# Patient Record
Sex: FEMALE | Race: WHITE | NOT HISPANIC OR LATINO | Employment: FULL TIME | URBAN - METROPOLITAN AREA
[De-identification: names, ages, dates, MRNs, and addresses within clinical notes are randomized per-mention and may not be internally consistent; named-entity substitution may affect disease eponyms.]

---

## 2021-01-26 ENCOUNTER — OFFICE VISIT (OUTPATIENT)
Dept: NEUROLOGY | Facility: CLINIC | Age: 41
End: 2021-01-26

## 2021-01-26 ENCOUNTER — TELEPHONE (OUTPATIENT)
Dept: NEUROLOGY | Facility: CLINIC | Age: 41
End: 2021-01-26

## 2021-01-26 VITALS
HEIGHT: 60 IN | SYSTOLIC BLOOD PRESSURE: 118 MMHG | OXYGEN SATURATION: 95 % | WEIGHT: 147 LBS | DIASTOLIC BLOOD PRESSURE: 70 MMHG | HEART RATE: 90 BPM | BODY MASS INDEX: 28.86 KG/M2

## 2021-01-26 DIAGNOSIS — G43.119 INTRACTABLE MIGRAINE WITH AURA WITHOUT STATUS MIGRAINOSUS: Primary | ICD-10-CM

## 2021-01-26 PROCEDURE — 99204 OFFICE O/P NEW MOD 45 MIN: CPT | Performed by: NURSE PRACTITIONER

## 2021-01-26 RX ORDER — TOPIRAMATE 25 MG/1
TABLET ORAL
Qty: 60 TABLET | Refills: 2 | Status: SHIPPED | OUTPATIENT
Start: 2021-01-26 | End: 2021-03-23 | Stop reason: SDUPTHER

## 2021-01-26 RX ORDER — AMITRIPTYLINE HYDROCHLORIDE 25 MG/1
50 TABLET, FILM COATED ORAL NIGHTLY
COMMUNITY
End: 2022-08-02 | Stop reason: SDUPTHER

## 2021-01-26 RX ORDER — CETIRIZINE HYDROCHLORIDE 10 MG/1
10 TABLET ORAL DAILY
COMMUNITY
End: 2021-03-02 | Stop reason: HOSPADM

## 2021-01-26 RX ORDER — ATORVASTATIN CALCIUM 10 MG/1
10 TABLET, FILM COATED ORAL NIGHTLY
COMMUNITY

## 2021-01-26 RX ORDER — LEVOTHYROXINE SODIUM 0.12 MG/1
125 TABLET ORAL DAILY
COMMUNITY

## 2021-01-26 RX ORDER — HYDROCHLOROTHIAZIDE 25 MG/1
25 TABLET ORAL DAILY
COMMUNITY

## 2021-01-26 RX ORDER — FEXOFENADINE HCL 180 MG/1
180 TABLET ORAL DAILY
COMMUNITY

## 2021-01-26 RX ORDER — SUMATRIPTAN 50 MG/1
50 TABLET, FILM COATED ORAL
COMMUNITY
End: 2021-10-01

## 2021-01-26 RX ORDER — ONDANSETRON 4 MG/1
4 TABLET, FILM COATED ORAL EVERY 8 HOURS PRN
COMMUNITY

## 2021-01-26 RX ORDER — LORATADINE 10 MG/1
10 TABLET ORAL DAILY
COMMUNITY
End: 2021-03-02 | Stop reason: HOSPADM

## 2021-01-26 NOTE — PROGRESS NOTES
Subjective:     Patient ID: Laura Cuenca is a 40 y.o. female presenting for evaluation for migraine headaches. She states that she has had them since she was about 12 years old. She did not seek treatment for them until recently. She has not had any head imaging. She has a headache almost every day. The pain is right sided. She has photophobia and phonophobia with them. She was prescribed Elavil for them and this seemed to help for the first few months but over time has seemed to lose its efficacy. It does help her sleep though so she does not want to discontinue it. She denies a family history of migraine. She smokes 1/2 PPD and has done so for 25 years. Does not drink alcohol. She does smoke marijuana regularly. She says she has done so for 25 years. Denies a family history of migraine.     History of Present Illness  The following portions of the patient's history were reviewed and updated as appropriate: allergies, current medications, past family history, past medical history, past social history, past surgical history and problem list.    Review of Systems   Constitutional: Negative for chills, fatigue and fever.   HENT: Negative for hearing loss, sneezing and trouble swallowing.    Eyes: Positive for visual disturbance. Negative for photophobia and redness.   Respiratory: Negative for cough, choking and wheezing.    Cardiovascular: Negative for chest pain, palpitations and leg swelling.   Gastrointestinal: Negative for diarrhea, nausea and vomiting.   Endocrine: Negative for cold intolerance, heat intolerance and polydipsia.   Genitourinary: Negative for decreased urine volume, difficulty urinating and urgency.   Musculoskeletal: Negative for back pain, gait problem and neck pain.   Skin: Negative for color change, pallor and wound.   Allergic/Immunologic: Positive for environmental allergies and food allergies. Negative for immunocompromised state.   Neurological: Positive for headaches. Negative for  dizziness, tremors, seizures, syncope, facial asymmetry, speech difficulty, weakness, light-headedness and numbness.   Hematological: Negative for adenopathy. Does not bruise/bleed easily.   Psychiatric/Behavioral: Negative for behavioral problems and self-injury. The patient is not hyperactive.         Objective:    Neurologic Exam  General: Well nourished, well developed, and in no acute distress.  HEENT: Normocephalic/atraumatic. Mucous membranes moist. Sclerae anicteric.   Heart: Regular rate and rhythm. No murmurs, rubs or gallops.  Lungs: Clear to auscultation bilaterally.  Skin: No notable rashes or lesions on the visible surfaces.   Extremities: No clubbing, cyanosis or significant edema.   Psychiatric: Pleasant, cooperative, and appropriate.   Neurologic Exam:  Mental Status:  Alert and oriented. Speech is fluent. Comprehension is intact.   Cranial Nerves II-XII: Pupils equal, round, reactive to light. Extraocular movements are full and conjugate in all directions. Pursuit movements do not provoke any apparent dizziness or discomfort.  No nystagmus noted.  Hearing is intact to voice. Facial strength and sensation are preserved and symmetric. Tongue and palate midline. Voice non-hoarse, non-dysarthric.   Motor: Normal bulk and tone of bilateral upper and lower extremities. Strength is 5/5 in all 4 extremities both proximally and distally. There are no abnormal or involuntary movements noted.  Sensation: Intact to light touch throughout. Romberg was negative with no significant sway.   Coordination: Fully intact. Finger-to-nose performed accurately bilaterally.  Reflexes: The deep tendon reflexes are 2+/4 in bilateral biceps, brachioradialis, triceps, patella, and Achilles.  No pathologic reflexes were noted.  Gait: Normal. No ataxia or apraxia.         Physical Exam    Assessment/Plan:     Diagnoses and all orders for this visit:    1. Intractable migraine with aura without status migrainosus (Primary)  -      MRI Brain With & Without Contrast; Future    Other orders  -     topiramate (Topamax) 25 MG tablet; Take one tablet by mouth nightly for 1 week, and then increase to 2 tablets by mouth nightly.  Dispense: 60 tablet; Refill: 2        The amitriptyline does improve her sleep so she wants to continue taking it. I see no problem with this but suggested she try topiramate for her headaches. R/B/SE reviewed. She denies a history of kidney stones. Will start with 25 mg nightly and increase by 25 mg weekly up to 100 mg nightly. Samples of Ubrelvy given to try prn. If this works well she will let us know and we can send a prescription. MRI brain ordered as well. Will call her with results. Discussed the importance of healthy lifestyle habits to reduce headache frequency, including healthy diet, regular exercise, good sleep hygiene, and smoking cessation. She will follow up in 8 weeks.

## 2021-01-26 NOTE — TELEPHONE ENCOUNTER
Pt wants her Topamax script sent to [facility].  She just told me when I checked her out.  I told her it may have already gone to the other pharmacy.  Please let pt know.      Thanks,  Ivis

## 2021-02-10 ENCOUNTER — HOSPITAL ENCOUNTER (OUTPATIENT)
Dept: MRI IMAGING | Facility: HOSPITAL | Age: 41
Discharge: HOME | End: 2021-02-10
Admitting: NURSE PRACTITIONER

## 2021-02-10 ENCOUNTER — DOCUMENTATION (OUTPATIENT)
Dept: NEUROLOGY | Facility: CLINIC | Age: 41
End: 2021-02-10

## 2021-02-10 DIAGNOSIS — G43.119 INTRACTABLE MIGRAINE WITH AURA WITHOUT STATUS MIGRAINOSUS: ICD-10-CM

## 2021-02-10 DIAGNOSIS — R90.89 ABNORMAL FINDING ON MRI OF BRAIN: Primary | ICD-10-CM

## 2021-02-10 PROCEDURE — 0 GADOBENATE DIMEGLUMINE 529 MG/ML SOLUTION: Performed by: NURSE PRACTITIONER

## 2021-02-10 PROCEDURE — 70553 MRI BRAIN STEM W/O & W/DYE: CPT

## 2021-02-10 PROCEDURE — A9577 INJ MULTIHANCE: HCPCS | Performed by: NURSE PRACTITIONER

## 2021-02-10 RX ADMIN — GADOBENATE DIMEGLUMINE 13 ML: 529 INJECTION, SOLUTION INTRAVENOUS at 08:32

## 2021-02-11 ENCOUNTER — TELEPHONE (OUTPATIENT)
Dept: NEUROSURGERY | Facility: CLINIC | Age: 41
End: 2021-02-11

## 2021-02-11 ENCOUNTER — OFFICE VISIT (OUTPATIENT)
Dept: NEUROSURGERY | Facility: CLINIC | Age: 41
End: 2021-02-11

## 2021-02-11 VITALS
WEIGHT: 143 LBS | BODY MASS INDEX: 28.07 KG/M2 | HEART RATE: 111 BPM | HEIGHT: 60 IN | TEMPERATURE: 98.9 F | DIASTOLIC BLOOD PRESSURE: 100 MMHG | SYSTOLIC BLOOD PRESSURE: 148 MMHG

## 2021-02-11 DIAGNOSIS — D32.9 MENINGIOMA (HCC): Primary | ICD-10-CM

## 2021-02-11 PROCEDURE — 99205 OFFICE O/P NEW HI 60 MIN: CPT | Performed by: NEUROLOGICAL SURGERY

## 2021-02-11 NOTE — PROGRESS NOTES
"Subjective   Patient ID: Laura Cuenca is a 40 y.o. female is being seen for consultation today at the request of JULIA Landon for meningioma.     Treatment:no recent    Today patient is having migraine, when blowing nose there is blood in mucous, N/T bilateral hands.     Patient, Provider, and MA are all wearing a mask in our office today.     History of Present Illness     This patient has a history of migraines which she has had since she was in her teens.  Recently they have gotten worse but she was found to have some changes in her thyroid hormones and her medication was increased and this did help the headaches for a while.  The headaches are somewhat global and do have a visual prodrome.  They sound like actual migraines.  She does have visual prodromes and has had some blind spots in her right eye after a migraine that occurred a couple of months ago.    The following portions of the patient's history were reviewed and updated as appropriate: allergies, current medications, past family history, past medical history, past social history, past surgical history and problem list.    Review of Systems   Constitutional: Negative for chills and fever.   HENT: Negative for congestion.    Eyes: Positive for photophobia and visual disturbance.   Musculoskeletal: Positive for neck pain and neck stiffness.   Neurological: Positive for weakness, numbness and headaches.       I have reviewed the review of systems as documented by my MA.      Objective     Vitals:    02/11/21 1345   BP: 148/100   Cuff Size: Adult   Pulse: 111   Temp: 98.9 °F (37.2 °C)   Weight: 64.9 kg (143 lb)   Height: 152.4 cm (60\")     Body mass index is 27.93 kg/m².      Physical Exam  Constitutional:       Appearance: She is well-developed.   HENT:      Head: Normocephalic and atraumatic.   Eyes:      Extraocular Movements: EOM normal.      Conjunctiva/sclera: Conjunctivae normal.      Pupils: Pupils are equal, round, and reactive to light. "   Neck:      Vascular: No carotid bruit.   Neurological:      Mental Status: She is oriented to person, place, and time.      Coordination: Finger-Nose-Finger Test and Heel to Shin Test normal.      Gait: Gait is intact.      Deep Tendon Reflexes:      Reflex Scores:       Tricep reflexes are 2+ on the right side and 2+ on the left side.       Bicep reflexes are 2+ on the right side and 2+ on the left side.       Brachioradialis reflexes are 2+ on the right side and 2+ on the left side.       Patellar reflexes are 2+ on the right side and 2+ on the left side.       Achilles reflexes are 2+ on the right side and 2+ on the left side.  Psychiatric:         Speech: Speech normal.       Neurologic Exam     Mental Status   Oriented to person, place, and time.   Registration of memory: Good recent and remote memory.   Attention: normal. Concentration: normal.   Speech: speech is normal   Level of consciousness: alert  Knowledge: consistent with education.     Cranial Nerves     CN II   Visual fields full to confrontation.   Visual acuity: normal    CN III, IV, VI   Pupils are equal, round, and reactive to light.  Extraocular motions are normal.     CN V   Facial sensation intact.   Right corneal reflex: normal  Left corneal reflex: normal    CN VII   Facial expression full, symmetric.   Right facial weakness: none  Left facial weakness: none    CN VIII   Hearing: intact    CN IX, X   Palate: symmetric    CN XI   Right sternocleidomastoid strength: normal  Left sternocleidomastoid strength: normal    CN XII   Tongue: not atrophic  Tongue deviation: none    Motor Exam   Muscle bulk: normal  Right arm tone: normal  Left arm tone: normal  Right leg tone: normal  Left leg tone: normal    Strength   Strength 5/5 except as noted.     Sensory Exam   Light touch normal.     Gait, Coordination, and Reflexes     Gait  Gait: normal    Coordination   Finger to nose coordination: normal  Heel to shin coordination: normal    Reflexes    Right brachioradialis: 2+  Left brachioradialis: 2+  Right biceps: 2+  Left biceps: 2+  Right triceps: 2+  Left triceps: 2+  Right patellar: 2+  Left patellar: 2+  Right achilles: 2+  Left achilles: 2+  Right : 2+  Left : 2+          Assessment/Plan   Independent Review of Radiographic Studies:      I personally reviewed the images from the following studies.    I reviewed her MRI done on 10 February of this year.  This shows a medium to large meningioma arising from the planum sphenoidale and compressing the surrounding brain.  The radiologist felt this was probably a meningioma.  I would agree.    Medical Decision Making:      I told the patient that we can either follow this or we can consider surgery.  I explained the advantages and disadvantages of each step.  I think most importantly she is only 40 years old and this thing is already decent sized and so there is almost no question she will at some point need to have surgery for it therefore I would tend to want to do this sooner than later because the risk is lower.  I explained that the biggest risk of surgery will be damaging her smell and therefore taste.  Other risks such as coma, death, paralysis, infection, bleeding, and anesthetic risk are lower.  We discussed the postoperative hospital and home course.  She would like to think about it and will call if she wishes to proceed.  If she does wish to proceed she will need a preoperative CT for stereotactic guidance.    Diagnoses and all orders for this visit:    1. Meningioma (CMS/HCC) (Primary)      Return for 2-3 week post op.

## 2021-02-11 NOTE — TELEPHONE ENCOUNTER
Pt called and was in today to see Dr. Carver. She has decided to go ahead with the surgery to remove the tumor.       Pt contact # [phone]  Best time to call: anytime

## 2021-02-15 ENCOUNTER — PREP FOR SURGERY (OUTPATIENT)
Dept: OTHER | Facility: HOSPITAL | Age: 41
End: 2021-02-15

## 2021-02-15 DIAGNOSIS — D32.9 MENINGIOMA (HCC): Primary | ICD-10-CM

## 2021-02-15 RX ORDER — CEFAZOLIN SODIUM 2 G/100ML
2 INJECTION, SOLUTION INTRAVENOUS ONCE
Status: CANCELLED | OUTPATIENT
Start: 2021-02-26 | End: 2021-02-15

## 2021-02-24 ENCOUNTER — HOSPITAL ENCOUNTER (OUTPATIENT)
Dept: CT IMAGING | Facility: HOSPITAL | Age: 41
End: 2021-02-24

## 2021-02-24 ENCOUNTER — APPOINTMENT (OUTPATIENT)
Dept: PREADMISSION TESTING | Facility: HOSPITAL | Age: 41
End: 2021-02-24

## 2021-02-24 ENCOUNTER — HOSPITAL ENCOUNTER (OUTPATIENT)
Dept: CT IMAGING | Facility: HOSPITAL | Age: 41
Discharge: HOME | End: 2021-02-24

## 2021-02-24 VITALS
BODY MASS INDEX: 27.72 KG/M2 | DIASTOLIC BLOOD PRESSURE: 89 MMHG | TEMPERATURE: 98.4 F | RESPIRATION RATE: 16 BRPM | HEART RATE: 98 BPM | SYSTOLIC BLOOD PRESSURE: 137 MMHG | HEIGHT: 60 IN | OXYGEN SATURATION: 98 % | WEIGHT: 141.2 LBS

## 2021-02-24 DIAGNOSIS — D32.9 MENINGIOMA (HCC): ICD-10-CM

## 2021-02-24 LAB
ABO GROUP BLD: NORMAL
ANION GAP SERPL CALCULATED.3IONS-SCNC: 10.2 MMOL/L (ref 5–15)
BLD GP AB SCN SERPL QL: NEGATIVE
BUN SERPL-MCNC: 10 MG/DL (ref 6–20)
BUN/CREAT SERPL: 12.8 (ref 7–25)
CALCIUM SPEC-SCNC: 9.2 MG/DL (ref 8.6–10.5)
CHLORIDE SERPL-SCNC: 102 MMOL/L (ref 98–107)
CO2 SERPL-SCNC: 23.8 MMOL/L (ref 22–29)
CREAT SERPL-MCNC: 0.78 MG/DL (ref 0.57–1)
DEPRECATED RDW RBC AUTO: 44 FL (ref 37–54)
ERYTHROCYTE [DISTWIDTH] IN BLOOD BY AUTOMATED COUNT: 14.1 % (ref 12.3–15.4)
GFR SERPL CREATININE-BSD FRML MDRD: 82 ML/MIN/1.73
GLUCOSE SERPL-MCNC: 107 MG/DL (ref 65–99)
HCG SERPL QL: NEGATIVE
HCT VFR BLD AUTO: 46.1 % (ref 34–46.6)
HGB BLD-MCNC: 15.8 G/DL (ref 12–15.9)
MCH RBC QN AUTO: 29.5 PG (ref 26.6–33)
MCHC RBC AUTO-ENTMCNC: 34.3 G/DL (ref 31.5–35.7)
MCV RBC AUTO: 86.2 FL (ref 79–97)
PLATELET # BLD AUTO: 380 10*3/MM3 (ref 140–450)
PMV BLD AUTO: 10 FL (ref 6–12)
POTASSIUM SERPL-SCNC: 3.2 MMOL/L (ref 3.5–5.2)
QT INTERVAL: 416 MS
RBC # BLD AUTO: 5.35 10*6/MM3 (ref 3.77–5.28)
RH BLD: POSITIVE
SODIUM SERPL-SCNC: 136 MMOL/L (ref 136–145)
T&S EXPIRATION DATE: NORMAL
WBC # BLD AUTO: 6.94 10*3/MM3 (ref 3.4–10.8)

## 2021-02-24 PROCEDURE — 84703 CHORIONIC GONADOTROPIN ASSAY: CPT

## 2021-02-24 PROCEDURE — 86901 BLOOD TYPING SEROLOGIC RH(D): CPT

## 2021-02-24 PROCEDURE — 80048 BASIC METABOLIC PNL TOTAL CA: CPT

## 2021-02-24 PROCEDURE — 0 IOPAMIDOL PER 1 ML: Performed by: NEUROLOGICAL SURGERY

## 2021-02-24 PROCEDURE — 93010 ELECTROCARDIOGRAM REPORT: CPT | Performed by: INTERNAL MEDICINE

## 2021-02-24 PROCEDURE — C9803 HOPD COVID-19 SPEC COLLECT: HCPCS | Performed by: NURSE PRACTITIONER

## 2021-02-24 PROCEDURE — 93005 ELECTROCARDIOGRAM TRACING: CPT

## 2021-02-24 PROCEDURE — 70470 CT HEAD/BRAIN W/O & W/DYE: CPT

## 2021-02-24 PROCEDURE — 36415 COLL VENOUS BLD VENIPUNCTURE: CPT

## 2021-02-24 PROCEDURE — 85027 COMPLETE CBC AUTOMATED: CPT

## 2021-02-24 PROCEDURE — 86900 BLOOD TYPING SEROLOGIC ABO: CPT

## 2021-02-24 PROCEDURE — U0004 COV-19 TEST NON-CDC HGH THRU: HCPCS | Performed by: NURSE PRACTITIONER

## 2021-02-24 PROCEDURE — 86850 RBC ANTIBODY SCREEN: CPT

## 2021-02-24 RX ADMIN — IOPAMIDOL 100 ML: 755 INJECTION, SOLUTION INTRAVENOUS at 08:01

## 2021-02-25 LAB — SARS-COV-2 RNA RESP QL NAA+PROBE: NOT DETECTED

## 2021-02-26 ENCOUNTER — APPOINTMENT (OUTPATIENT)
Dept: CT IMAGING | Facility: HOSPITAL | Age: 41
End: 2021-02-26

## 2021-02-26 ENCOUNTER — ANESTHESIA (OUTPATIENT)
Dept: PERIOP | Facility: HOSPITAL | Age: 41
End: 2021-02-26

## 2021-02-26 ENCOUNTER — ANESTHESIA EVENT (OUTPATIENT)
Dept: PERIOP | Facility: HOSPITAL | Age: 41
End: 2021-02-26

## 2021-02-26 ENCOUNTER — HOSPITAL ENCOUNTER (INPATIENT)
Facility: HOSPITAL | Age: 41
LOS: 4 days | Discharge: HOME | End: 2021-03-02
Attending: NEUROLOGICAL SURGERY | Admitting: NEUROLOGICAL SURGERY

## 2021-02-26 DIAGNOSIS — D32.9 MENINGIOMA (HCC): ICD-10-CM

## 2021-02-26 DIAGNOSIS — D33.2 DERMOID CYST OF BRAIN (HCC): Primary | ICD-10-CM

## 2021-02-26 LAB
ANION GAP SERPL CALCULATED.3IONS-SCNC: 14 MMOL/L (ref 5–15)
BASOPHILS # BLD AUTO: 0.07 10*3/MM3 (ref 0–0.2)
BASOPHILS NFR BLD AUTO: 0.3 % (ref 0–1.5)
BUN SERPL-MCNC: 13 MG/DL (ref 6–20)
BUN/CREAT SERPL: 17.6 (ref 7–25)
CALCIUM SPEC-SCNC: 7.8 MG/DL (ref 8.6–10.5)
CHLORIDE SERPL-SCNC: 100 MMOL/L (ref 98–107)
CO2 SERPL-SCNC: 21 MMOL/L (ref 22–29)
CREAT SERPL-MCNC: 0.74 MG/DL (ref 0.57–1)
DEPRECATED RDW RBC AUTO: 46.3 FL (ref 37–54)
EOSINOPHIL # BLD AUTO: 0.02 10*3/MM3 (ref 0–0.4)
EOSINOPHIL NFR BLD AUTO: 0.1 % (ref 0.3–6.2)
ERYTHROCYTE [DISTWIDTH] IN BLOOD BY AUTOMATED COUNT: 14 % (ref 12.3–15.4)
GFR SERPL CREATININE-BSD FRML MDRD: 87 ML/MIN/1.73
GLUCOSE BLDC GLUCOMTR-MCNC: 255 MG/DL (ref 70–130)
GLUCOSE SERPL-MCNC: 237 MG/DL (ref 65–99)
HCT VFR BLD AUTO: 40.8 % (ref 34–46.6)
HGB BLD-MCNC: 14 G/DL (ref 12–15.9)
IMM GRANULOCYTES # BLD AUTO: 0.16 10*3/MM3 (ref 0–0.05)
IMM GRANULOCYTES NFR BLD AUTO: 0.7 % (ref 0–0.5)
LYMPHOCYTES # BLD AUTO: 1.5 10*3/MM3 (ref 0.7–3.1)
LYMPHOCYTES NFR BLD AUTO: 6.2 % (ref 19.6–45.3)
MCH RBC QN AUTO: 31 PG (ref 26.6–33)
MCHC RBC AUTO-ENTMCNC: 34.3 G/DL (ref 31.5–35.7)
MCV RBC AUTO: 90.5 FL (ref 79–97)
MONOCYTES # BLD AUTO: 1.55 10*3/MM3 (ref 0.1–0.9)
MONOCYTES NFR BLD AUTO: 6.4 % (ref 5–12)
NEUTROPHILS NFR BLD AUTO: 20.9 10*3/MM3 (ref 1.7–7)
NEUTROPHILS NFR BLD AUTO: 86.3 % (ref 42.7–76)
NRBC BLD AUTO-RTO: 0 /100 WBC (ref 0–0.2)
PLATELET # BLD AUTO: 394 10*3/MM3 (ref 140–450)
PMV BLD AUTO: 10.2 FL (ref 6–12)
POTASSIUM SERPL-SCNC: 2.6 MMOL/L (ref 3.5–5.2)
RBC # BLD AUTO: 4.51 10*6/MM3 (ref 3.77–5.28)
SODIUM SERPL-SCNC: 135 MMOL/L (ref 136–145)
WBC # BLD AUTO: 24.2 10*3/MM3 (ref 3.4–10.8)

## 2021-02-26 PROCEDURE — 25010000002 MIDAZOLAM PER 1 MG: Performed by: ANESTHESIOLOGY

## 2021-02-26 PROCEDURE — C1713 ANCHOR/SCREW BN/BN,TIS/BN: HCPCS | Performed by: NEUROLOGICAL SURGERY

## 2021-02-26 PROCEDURE — 25010000002 HYDROMORPHONE PER 4 MG: Performed by: NEUROLOGICAL SURGERY

## 2021-02-26 PROCEDURE — 15769 GRFG AUTOL SOFT TISS DIR EXC: CPT | Performed by: NEUROLOGICAL SURGERY

## 2021-02-26 PROCEDURE — 61781 SCAN PROC CRANIAL INTRA: CPT | Performed by: NEUROLOGICAL SURGERY

## 2021-02-26 PROCEDURE — 25010000002 FENTANYL CITRATE (PF) 100 MCG/2ML SOLUTION: Performed by: NURSE ANESTHETIST, CERTIFIED REGISTERED

## 2021-02-26 PROCEDURE — 25010000002 ONDANSETRON PER 1 MG: Performed by: NURSE ANESTHETIST, CERTIFIED REGISTERED

## 2021-02-26 PROCEDURE — 25010000002 NEOSTIGMINE 10 MG/10ML SOLUTION: Performed by: STUDENT IN AN ORGANIZED HEALTH CARE EDUCATION/TRAINING PROGRAM

## 2021-02-26 PROCEDURE — 00B10ZZ EXCISION OF CEREBRAL MENINGES, OPEN APPROACH: ICD-10-PCS | Performed by: NEUROLOGICAL SURGERY

## 2021-02-26 PROCEDURE — 82962 GLUCOSE BLOOD TEST: CPT

## 2021-02-26 PROCEDURE — 88307 TISSUE EXAM BY PATHOLOGIST: CPT | Performed by: NEUROLOGICAL SURGERY

## 2021-02-26 PROCEDURE — 25010000003 CEFAZOLIN IN DEXTROSE 2-4 GM/100ML-% SOLUTION: Performed by: NEUROLOGICAL SURGERY

## 2021-02-26 PROCEDURE — 09US07Z SUPPLEMENT RIGHT FRONTAL SINUS WITH AUTOLOGOUS TISSUE SUBSTITUTE, OPEN APPROACH: ICD-10-PCS | Performed by: NEUROLOGICAL SURGERY

## 2021-02-26 PROCEDURE — 61510 CRNEC TREPH EXC BRN TUM STTL: CPT | Performed by: SPECIALIST/TECHNOLOGIST, OTHER

## 2021-02-26 PROCEDURE — 69990 MICROSURGERY ADD-ON: CPT | Performed by: NEUROLOGICAL SURGERY

## 2021-02-26 PROCEDURE — 25010000002 VANCOMYCIN 1 G RECONSTITUTED SOLUTION 1 EACH VIAL: Performed by: NEUROLOGICAL SURGERY

## 2021-02-26 PROCEDURE — 80048 BASIC METABOLIC PNL TOTAL CA: CPT | Performed by: NEUROLOGICAL SURGERY

## 2021-02-26 PROCEDURE — 25010000002 DEXAMETHASONE PER 1 MG: Performed by: NURSE ANESTHETIST, CERTIFIED REGISTERED

## 2021-02-26 PROCEDURE — 25010000002 PROPOFOL 10 MG/ML EMULSION: Performed by: NURSE ANESTHETIST, CERTIFIED REGISTERED

## 2021-02-26 PROCEDURE — 09UT07Z SUPPLEMENT LEFT FRONTAL SINUS WITH AUTOLOGOUS TISSUE SUBSTITUTE, OPEN APPROACH: ICD-10-PCS | Performed by: NEUROLOGICAL SURGERY

## 2021-02-26 PROCEDURE — 25810000003 SODIUM CHLORIDE 0.9 % WITH KCL 20 MEQ 20-0.9 MEQ/L-% SOLUTION: Performed by: NEUROLOGICAL SURGERY

## 2021-02-26 PROCEDURE — 0JB80ZZ EXCISION OF ABDOMEN SUBCUTANEOUS TISSUE AND FASCIA, OPEN APPROACH: ICD-10-PCS | Performed by: NEUROLOGICAL SURGERY

## 2021-02-26 PROCEDURE — 25010000002 HYDROMORPHONE PER 4 MG: Performed by: NURSE ANESTHETIST, CERTIFIED REGISTERED

## 2021-02-26 PROCEDURE — 70450 CT HEAD/BRAIN W/O DYE: CPT

## 2021-02-26 PROCEDURE — 85025 COMPLETE CBC W/AUTO DIFF WBC: CPT | Performed by: NEUROLOGICAL SURGERY

## 2021-02-26 PROCEDURE — 88311 DECALCIFY TISSUE: CPT | Performed by: NEUROLOGICAL SURGERY

## 2021-02-26 PROCEDURE — 25010000002 ONDANSETRON PER 1 MG: Performed by: NEUROLOGICAL SURGERY

## 2021-02-26 PROCEDURE — 61510 CRNEC TREPH EXC BRN TUM STTL: CPT | Performed by: NEUROLOGICAL SURGERY

## 2021-02-26 PROCEDURE — 25010000002 FENTANYL CITRATE (PF) 100 MCG/2ML SOLUTION: Performed by: ANESTHESIOLOGY

## 2021-02-26 DEVICE — ABSORBABLE HEMOSTAT (OXIDIZED REGENERATED CELLULOSE, U.S.P.)
Type: IMPLANTABLE DEVICE | Site: BRAIN | Status: FUNCTIONAL
Brand: SURGICEL FIBRILLAR

## 2021-02-26 DEVICE — SCRW CRS/DRV DRL FREE MICRO TI 1.5X4MM: Type: IMPLANTABLE DEVICE | Site: CRANIAL | Status: FUNCTIONAL

## 2021-02-26 DEVICE — PLT CVR LEVELONE BURHL LP CONTRL .3X17X1.5MM: Type: IMPLANTABLE DEVICE | Site: CRANIAL | Status: FUNCTIONAL

## 2021-02-26 DEVICE — SSC BONE WAX
Type: IMPLANTABLE DEVICE | Site: CRANIAL | Status: FUNCTIONAL
Brand: SSC BONE WAX

## 2021-02-26 DEVICE — PLT CVR LEVELONE BURHL LP CONTRL .3X22X1.5MM: Type: IMPLANTABLE DEVICE | Site: CRANIAL | Status: FUNCTIONAL

## 2021-02-26 DEVICE — FLOSEAL HEMOSTATIC MATRIX, 5ML
Type: IMPLANTABLE DEVICE | Site: BRAIN | Status: FUNCTIONAL
Brand: FLOSEAL HEMOSTATIC MATRIX

## 2021-02-26 RX ORDER — HYDROCHLOROTHIAZIDE 25 MG/1
25 TABLET ORAL DAILY
Status: DISCONTINUED | OUTPATIENT
Start: 2021-02-26 | End: 2021-03-02 | Stop reason: HOSPADM

## 2021-02-26 RX ORDER — TOPIRAMATE 50 MG/1
50 TABLET, FILM COATED ORAL NIGHTLY
Status: DISCONTINUED | OUTPATIENT
Start: 2021-02-26 | End: 2021-03-02 | Stop reason: HOSPADM

## 2021-02-26 RX ORDER — NEOSTIGMINE METHYLSULFATE 1 MG/ML
INJECTION, SOLUTION INTRAVENOUS AS NEEDED
Status: DISCONTINUED | OUTPATIENT
Start: 2021-02-26 | End: 2021-02-26 | Stop reason: SURG

## 2021-02-26 RX ORDER — FENTANYL CITRATE 50 UG/ML
50 INJECTION, SOLUTION INTRAMUSCULAR; INTRAVENOUS
Status: DISCONTINUED | OUTPATIENT
Start: 2021-02-26 | End: 2021-02-26 | Stop reason: HOSPADM

## 2021-02-26 RX ORDER — ACETAMINOPHEN 325 MG/1
650 TABLET ORAL EVERY 6 HOURS PRN
COMMUNITY
End: 2022-05-11

## 2021-02-26 RX ORDER — ONDANSETRON 2 MG/ML
4 INJECTION INTRAMUSCULAR; INTRAVENOUS EVERY 6 HOURS PRN
Status: DISCONTINUED | OUTPATIENT
Start: 2021-02-26 | End: 2021-03-02 | Stop reason: HOSPADM

## 2021-02-26 RX ORDER — LABETALOL HYDROCHLORIDE 5 MG/ML
5 INJECTION, SOLUTION INTRAVENOUS
Status: DISCONTINUED | OUTPATIENT
Start: 2021-02-26 | End: 2021-02-26 | Stop reason: HOSPADM

## 2021-02-26 RX ORDER — POTASSIUM CHLORIDE 7.45 MG/ML
10 INJECTION INTRAVENOUS
Status: DISCONTINUED | OUTPATIENT
Start: 2021-02-26 | End: 2021-03-02 | Stop reason: HOSPADM

## 2021-02-26 RX ORDER — LIDOCAINE HYDROCHLORIDE 10 MG/ML
0.5 INJECTION, SOLUTION EPIDURAL; INFILTRATION; INTRACAUDAL; PERINEURAL ONCE AS NEEDED
Status: DISCONTINUED | OUTPATIENT
Start: 2021-02-26 | End: 2021-02-26 | Stop reason: HOSPADM

## 2021-02-26 RX ORDER — NALOXONE HCL 0.4 MG/ML
0.2 VIAL (ML) INJECTION AS NEEDED
Status: DISCONTINUED | OUTPATIENT
Start: 2021-02-26 | End: 2021-02-26 | Stop reason: HOSPADM

## 2021-02-26 RX ORDER — MIDAZOLAM HYDROCHLORIDE 1 MG/ML
1 INJECTION INTRAMUSCULAR; INTRAVENOUS
Status: COMPLETED | OUTPATIENT
Start: 2021-02-26 | End: 2021-02-26

## 2021-02-26 RX ORDER — LEVOTHYROXINE SODIUM 0.12 MG/1
125 TABLET ORAL DAILY
Status: DISCONTINUED | OUTPATIENT
Start: 2021-02-27 | End: 2021-03-02 | Stop reason: HOSPADM

## 2021-02-26 RX ORDER — ONDANSETRON 2 MG/ML
INJECTION INTRAMUSCULAR; INTRAVENOUS AS NEEDED
Status: DISCONTINUED | OUTPATIENT
Start: 2021-02-26 | End: 2021-02-26 | Stop reason: SURG

## 2021-02-26 RX ORDER — MIDAZOLAM HYDROCHLORIDE 1 MG/ML
INJECTION INTRAMUSCULAR; INTRAVENOUS
Status: COMPLETED | OUTPATIENT
Start: 2021-02-26 | End: 2021-02-26

## 2021-02-26 RX ORDER — MAGNESIUM SULFATE HEPTAHYDRATE 40 MG/ML
4 INJECTION, SOLUTION INTRAVENOUS AS NEEDED
Status: DISCONTINUED | OUTPATIENT
Start: 2021-02-26 | End: 2021-03-01

## 2021-02-26 RX ORDER — DEXAMETHASONE SODIUM PHOSPHATE 10 MG/ML
INJECTION INTRAMUSCULAR; INTRAVENOUS AS NEEDED
Status: DISCONTINUED | OUTPATIENT
Start: 2021-02-26 | End: 2021-02-26 | Stop reason: SURG

## 2021-02-26 RX ORDER — POTASSIUM CHLORIDE 750 MG/1
40 TABLET, FILM COATED, EXTENDED RELEASE ORAL AS NEEDED
Status: DISCONTINUED | OUTPATIENT
Start: 2021-02-26 | End: 2021-03-02 | Stop reason: HOSPADM

## 2021-02-26 RX ORDER — SODIUM CHLORIDE 0.9 % (FLUSH) 0.9 %
3-10 SYRINGE (ML) INJECTION AS NEEDED
Status: DISCONTINUED | OUTPATIENT
Start: 2021-02-26 | End: 2021-02-26 | Stop reason: HOSPADM

## 2021-02-26 RX ORDER — FLUMAZENIL 0.1 MG/ML
0.2 INJECTION INTRAVENOUS AS NEEDED
Status: DISCONTINUED | OUTPATIENT
Start: 2021-02-26 | End: 2021-02-26 | Stop reason: HOSPADM

## 2021-02-26 RX ORDER — HYDROMORPHONE HYDROCHLORIDE 1 MG/ML
0.5 INJECTION, SOLUTION INTRAMUSCULAR; INTRAVENOUS; SUBCUTANEOUS
Status: DISCONTINUED | OUTPATIENT
Start: 2021-02-26 | End: 2021-02-26 | Stop reason: HOSPADM

## 2021-02-26 RX ORDER — PROMETHAZINE HYDROCHLORIDE 25 MG/1
25 SUPPOSITORY RECTAL ONCE AS NEEDED
Status: DISCONTINUED | OUTPATIENT
Start: 2021-02-26 | End: 2021-02-26 | Stop reason: HOSPADM

## 2021-02-26 RX ORDER — DIPHENHYDRAMINE HCL 25 MG
25 CAPSULE ORAL
Status: DISCONTINUED | OUTPATIENT
Start: 2021-02-26 | End: 2021-02-26 | Stop reason: HOSPADM

## 2021-02-26 RX ORDER — ONDANSETRON 2 MG/ML
4 INJECTION INTRAMUSCULAR; INTRAVENOUS ONCE AS NEEDED
Status: COMPLETED | OUTPATIENT
Start: 2021-02-26 | End: 2021-02-26

## 2021-02-26 RX ORDER — POTASSIUM CHLORIDE 1.5 G/1.77G
40 POWDER, FOR SOLUTION ORAL AS NEEDED
Status: DISCONTINUED | OUTPATIENT
Start: 2021-02-26 | End: 2021-03-02 | Stop reason: HOSPADM

## 2021-02-26 RX ORDER — SUMATRIPTAN 50 MG/1
50 TABLET, FILM COATED ORAL
Status: DISCONTINUED | OUTPATIENT
Start: 2021-02-26 | End: 2021-03-02 | Stop reason: HOSPADM

## 2021-02-26 RX ORDER — FENTANYL CITRATE 50 UG/ML
INJECTION, SOLUTION INTRAMUSCULAR; INTRAVENOUS AS NEEDED
Status: DISCONTINUED | OUTPATIENT
Start: 2021-02-26 | End: 2021-02-26 | Stop reason: SURG

## 2021-02-26 RX ORDER — PROPOFOL 10 MG/ML
VIAL (ML) INTRAVENOUS AS NEEDED
Status: DISCONTINUED | OUTPATIENT
Start: 2021-02-26 | End: 2021-02-26 | Stop reason: SURG

## 2021-02-26 RX ORDER — GLYCOPYRROLATE 0.2 MG/ML
INJECTION INTRAMUSCULAR; INTRAVENOUS AS NEEDED
Status: DISCONTINUED | OUTPATIENT
Start: 2021-02-26 | End: 2021-02-26 | Stop reason: SURG

## 2021-02-26 RX ORDER — MAGNESIUM SULFATE HEPTAHYDRATE 40 MG/ML
2 INJECTION, SOLUTION INTRAVENOUS AS NEEDED
Status: DISCONTINUED | OUTPATIENT
Start: 2021-02-26 | End: 2021-03-01

## 2021-02-26 RX ORDER — ROCURONIUM BROMIDE 10 MG/ML
INJECTION, SOLUTION INTRAVENOUS AS NEEDED
Status: DISCONTINUED | OUTPATIENT
Start: 2021-02-26 | End: 2021-02-26 | Stop reason: SURG

## 2021-02-26 RX ORDER — CEFAZOLIN SODIUM 2 G/100ML
2 INJECTION, SOLUTION INTRAVENOUS ONCE
Status: COMPLETED | OUTPATIENT
Start: 2021-02-26 | End: 2021-02-26

## 2021-02-26 RX ORDER — SODIUM CHLORIDE 0.9 % (FLUSH) 0.9 %
3 SYRINGE (ML) INJECTION EVERY 12 HOURS SCHEDULED
Status: DISCONTINUED | OUTPATIENT
Start: 2021-02-26 | End: 2021-02-26 | Stop reason: HOSPADM

## 2021-02-26 RX ORDER — CEFAZOLIN SODIUM 2 G/100ML
2 INJECTION, SOLUTION INTRAVENOUS EVERY 8 HOURS
Status: COMPLETED | OUTPATIENT
Start: 2021-02-26 | End: 2021-02-28

## 2021-02-26 RX ORDER — ESMOLOL HYDROCHLORIDE 10 MG/ML
INJECTION INTRAVENOUS AS NEEDED
Status: DISCONTINUED | OUTPATIENT
Start: 2021-02-26 | End: 2021-02-26 | Stop reason: SURG

## 2021-02-26 RX ORDER — HYDROMORPHONE HYDROCHLORIDE 1 MG/ML
0.5 INJECTION, SOLUTION INTRAMUSCULAR; INTRAVENOUS; SUBCUTANEOUS
Status: DISCONTINUED | OUTPATIENT
Start: 2021-02-26 | End: 2021-02-27

## 2021-02-26 RX ORDER — SODIUM CHLORIDE, SODIUM ACETATE ANHYDROUS, SODIUM GLUCONATE, POTASSIUM CHLORIDE, AND MAGNESIUM CHLORIDE 526; 222; 502; 37; 30 MG/100ML; MG/100ML; MG/100ML; MG/100ML; MG/100ML
IRRIGANT IRRIGATION AS NEEDED
Status: DISCONTINUED | OUTPATIENT
Start: 2021-02-26 | End: 2021-02-26 | Stop reason: HOSPADM

## 2021-02-26 RX ORDER — EPHEDRINE SULFATE 50 MG/ML
5 INJECTION, SOLUTION INTRAVENOUS ONCE AS NEEDED
Status: DISCONTINUED | OUTPATIENT
Start: 2021-02-26 | End: 2021-02-26 | Stop reason: HOSPADM

## 2021-02-26 RX ORDER — AMITRIPTYLINE HYDROCHLORIDE 50 MG/1
50 TABLET, FILM COATED ORAL NIGHTLY
Status: DISCONTINUED | OUTPATIENT
Start: 2021-02-26 | End: 2021-03-02 | Stop reason: HOSPADM

## 2021-02-26 RX ORDER — HYDRALAZINE HYDROCHLORIDE 20 MG/ML
5 INJECTION INTRAMUSCULAR; INTRAVENOUS
Status: DISCONTINUED | OUTPATIENT
Start: 2021-02-26 | End: 2021-02-26 | Stop reason: HOSPADM

## 2021-02-26 RX ORDER — HYDROCODONE BITARTRATE AND ACETAMINOPHEN 5; 325 MG/1; MG/1
1 TABLET ORAL EVERY 4 HOURS PRN
Status: DISCONTINUED | OUTPATIENT
Start: 2021-02-26 | End: 2021-03-02 | Stop reason: HOSPADM

## 2021-02-26 RX ORDER — SODIUM CHLORIDE AND POTASSIUM CHLORIDE 150; 900 MG/100ML; MG/100ML
100 INJECTION, SOLUTION INTRAVENOUS CONTINUOUS
Status: DISCONTINUED | OUTPATIENT
Start: 2021-02-26 | End: 2021-02-28

## 2021-02-26 RX ORDER — LIDOCAINE HYDROCHLORIDE 20 MG/ML
INJECTION, SOLUTION INFILTRATION; PERINEURAL AS NEEDED
Status: DISCONTINUED | OUTPATIENT
Start: 2021-02-26 | End: 2021-02-26 | Stop reason: SURG

## 2021-02-26 RX ORDER — ONDANSETRON 4 MG/1
4 TABLET, FILM COATED ORAL EVERY 6 HOURS PRN
Status: DISCONTINUED | OUTPATIENT
Start: 2021-02-26 | End: 2021-03-02 | Stop reason: HOSPADM

## 2021-02-26 RX ORDER — FAMOTIDINE 10 MG/ML
20 INJECTION, SOLUTION INTRAVENOUS ONCE
Status: COMPLETED | OUTPATIENT
Start: 2021-02-26 | End: 2021-02-26

## 2021-02-26 RX ORDER — PROMETHAZINE HYDROCHLORIDE 25 MG/1
25 TABLET ORAL ONCE AS NEEDED
Status: DISCONTINUED | OUTPATIENT
Start: 2021-02-26 | End: 2021-02-26 | Stop reason: HOSPADM

## 2021-02-26 RX ORDER — DIPHENHYDRAMINE HYDROCHLORIDE 50 MG/ML
12.5 INJECTION INTRAMUSCULAR; INTRAVENOUS
Status: DISCONTINUED | OUTPATIENT
Start: 2021-02-26 | End: 2021-02-26 | Stop reason: HOSPADM

## 2021-02-26 RX ORDER — MIDAZOLAM HYDROCHLORIDE 1 MG/ML
2 INJECTION INTRAMUSCULAR; INTRAVENOUS
Status: COMPLETED | OUTPATIENT
Start: 2021-02-26 | End: 2021-02-26

## 2021-02-26 RX ORDER — SODIUM CHLORIDE, SODIUM LACTATE, POTASSIUM CHLORIDE, CALCIUM CHLORIDE 600; 310; 30; 20 MG/100ML; MG/100ML; MG/100ML; MG/100ML
9 INJECTION, SOLUTION INTRAVENOUS CONTINUOUS
Status: DISCONTINUED | OUTPATIENT
Start: 2021-02-26 | End: 2021-02-26

## 2021-02-26 RX ORDER — HYDROMORPHONE HCL 110MG/55ML
PATIENT CONTROLLED ANALGESIA SYRINGE INTRAVENOUS AS NEEDED
Status: DISCONTINUED | OUTPATIENT
Start: 2021-02-26 | End: 2021-02-26 | Stop reason: SURG

## 2021-02-26 RX ORDER — NALOXONE HCL 0.4 MG/ML
0.4 VIAL (ML) INJECTION
Status: DISCONTINUED | OUTPATIENT
Start: 2021-02-26 | End: 2021-02-27

## 2021-02-26 RX ADMIN — HYDROMORPHONE HYDROCHLORIDE 0.5 MG: 2 INJECTION, SOLUTION INTRAMUSCULAR; INTRAVENOUS; SUBCUTANEOUS at 13:24

## 2021-02-26 RX ADMIN — ROCURONIUM BROMIDE 10 MG: 50 INJECTION INTRAVENOUS at 12:54

## 2021-02-26 RX ADMIN — PROPOFOL 200 MG: 10 INJECTION, EMULSION INTRAVENOUS at 11:30

## 2021-02-26 RX ADMIN — ONDANSETRON 4 MG: 2 INJECTION INTRAMUSCULAR; INTRAVENOUS at 17:31

## 2021-02-26 RX ADMIN — MIDAZOLAM 1 MG: 1 INJECTION INTRAMUSCULAR; INTRAVENOUS at 10:14

## 2021-02-26 RX ADMIN — HYDROMORPHONE HYDROCHLORIDE 0.5 MG: 1 INJECTION, SOLUTION INTRAMUSCULAR; INTRAVENOUS; SUBCUTANEOUS at 19:24

## 2021-02-26 RX ADMIN — FAMOTIDINE 20 MG: 10 INJECTION INTRAVENOUS at 09:12

## 2021-02-26 RX ADMIN — MIDAZOLAM 1 MG: 1 INJECTION INTRAMUSCULAR; INTRAVENOUS at 09:10

## 2021-02-26 RX ADMIN — DEXAMETHASONE SODIUM PHOSPHATE 8 MG: 10 INJECTION INTRAMUSCULAR; INTRAVENOUS at 11:53

## 2021-02-26 RX ADMIN — NICARDIPINE HYDROCHLORIDE 5 MG/HR: 2.5 INJECTION, SOLUTION INTRAVENOUS at 20:39

## 2021-02-26 RX ADMIN — ROCURONIUM BROMIDE 10 MG: 50 INJECTION INTRAVENOUS at 13:27

## 2021-02-26 RX ADMIN — LIDOCAINE HYDROCHLORIDE 80 MG: 20 INJECTION, SOLUTION INFILTRATION; PERINEURAL at 11:30

## 2021-02-26 RX ADMIN — LIDOCAINE HYDROCHLORIDE 65 MG: 20 INJECTION, SOLUTION INFILTRATION; PERINEURAL at 15:31

## 2021-02-26 RX ADMIN — ESMOLOL HYDROCHLORIDE 25 MG: 100 INJECTION, SOLUTION INTRAVENOUS at 15:44

## 2021-02-26 RX ADMIN — FENTANYL CITRATE 50 MCG: 50 INJECTION INTRAMUSCULAR; INTRAVENOUS at 12:19

## 2021-02-26 RX ADMIN — TOPIRAMATE 50 MG: 50 TABLET, FILM COATED ORAL at 20:37

## 2021-02-26 RX ADMIN — MIDAZOLAM 1 MG: 1 INJECTION INTRAMUSCULAR; INTRAVENOUS at 09:46

## 2021-02-26 RX ADMIN — NEOSTIGMINE METHYLSULFATE 4 MG: 1 INJECTION INTRAVENOUS at 15:41

## 2021-02-26 RX ADMIN — ESMOLOL HYDROCHLORIDE 20 MG: 100 INJECTION, SOLUTION INTRAVENOUS at 14:33

## 2021-02-26 RX ADMIN — CEFAZOLIN SODIUM 2 G: 2 INJECTION, SOLUTION INTRAVENOUS at 19:33

## 2021-02-26 RX ADMIN — ESMOLOL HYDROCHLORIDE 10 MG: 100 INJECTION, SOLUTION INTRAVENOUS at 15:09

## 2021-02-26 RX ADMIN — FENTANYL CITRATE 50 MCG: 50 INJECTION INTRAMUSCULAR; INTRAVENOUS at 11:30

## 2021-02-26 RX ADMIN — FENTANYL CITRATE 50 MCG: 50 INJECTION, SOLUTION INTRAMUSCULAR; INTRAVENOUS at 18:11

## 2021-02-26 RX ADMIN — SODIUM CHLORIDE, POTASSIUM CHLORIDE, SODIUM LACTATE AND CALCIUM CHLORIDE: 600; 310; 30; 20 INJECTION, SOLUTION INTRAVENOUS at 12:28

## 2021-02-26 RX ADMIN — FENTANYL CITRATE 50 MCG: 50 INJECTION, SOLUTION INTRAMUSCULAR; INTRAVENOUS at 09:46

## 2021-02-26 RX ADMIN — FENTANYL CITRATE 100 MCG: 50 INJECTION INTRAMUSCULAR; INTRAVENOUS at 11:27

## 2021-02-26 RX ADMIN — ROCURONIUM BROMIDE 50 MG: 50 INJECTION INTRAVENOUS at 11:30

## 2021-02-26 RX ADMIN — FENTANYL CITRATE 50 MCG: 50 INJECTION, SOLUTION INTRAMUSCULAR; INTRAVENOUS at 18:18

## 2021-02-26 RX ADMIN — POTASSIUM CHLORIDE AND SODIUM CHLORIDE 100 ML/HR: 900; 150 INJECTION, SOLUTION INTRAVENOUS at 20:36

## 2021-02-26 RX ADMIN — HYDROMORPHONE HYDROCHLORIDE 0.5 MG: 1 INJECTION, SOLUTION INTRAMUSCULAR; INTRAVENOUS; SUBCUTANEOUS at 21:47

## 2021-02-26 RX ADMIN — ONDANSETRON 4 MG: 2 INJECTION INTRAMUSCULAR; INTRAVENOUS at 19:17

## 2021-02-26 RX ADMIN — NICARDIPINE HYDROCHLORIDE 5 MG/HR: 2.5 INJECTION, SOLUTION INTRAVENOUS at 16:30

## 2021-02-26 RX ADMIN — ESMOLOL HYDROCHLORIDE 10 MG: 100 INJECTION, SOLUTION INTRAVENOUS at 14:08

## 2021-02-26 RX ADMIN — SODIUM CHLORIDE, POTASSIUM CHLORIDE, SODIUM LACTATE AND CALCIUM CHLORIDE 9 ML/HR: 600; 310; 30; 20 INJECTION, SOLUTION INTRAVENOUS at 08:35

## 2021-02-26 RX ADMIN — AMITRIPTYLINE HYDROCHLORIDE 50 MG: 50 TABLET, FILM COATED ORAL at 20:36

## 2021-02-26 RX ADMIN — HYDROCHLOROTHIAZIDE 25 MG: 25 TABLET ORAL at 20:36

## 2021-02-26 RX ADMIN — MIDAZOLAM 1 MG: 1 INJECTION INTRAMUSCULAR; INTRAVENOUS at 09:20

## 2021-02-26 RX ADMIN — SODIUM CHLORIDE 5 MG/HR: 9 INJECTION, SOLUTION INTRAVENOUS at 16:25

## 2021-02-26 RX ADMIN — ONDANSETRON 4 MG: 2 INJECTION INTRAMUSCULAR; INTRAVENOUS at 15:15

## 2021-02-26 RX ADMIN — ROCURONIUM BROMIDE 10 MG: 50 INJECTION INTRAVENOUS at 12:25

## 2021-02-26 RX ADMIN — CEFAZOLIN SODIUM 2 G: 2 INJECTION, SOLUTION INTRAVENOUS at 11:40

## 2021-02-26 RX ADMIN — HYDROMORPHONE HYDROCHLORIDE 0.5 MG: 2 INJECTION, SOLUTION INTRAMUSCULAR; INTRAVENOUS; SUBCUTANEOUS at 13:03

## 2021-02-26 RX ADMIN — GLYCOPYRROLATE 0.6 MG: 0.2 INJECTION INTRAMUSCULAR; INTRAVENOUS at 15:41

## 2021-02-26 RX ADMIN — FENTANYL CITRATE 50 MCG: 50 INJECTION, SOLUTION INTRAMUSCULAR; INTRAVENOUS at 10:14

## 2021-02-26 NOTE — ANESTHESIA PROCEDURE NOTES
Airway  Urgency: elective    Date/Time: 2/26/2021 11:33 AM  Airway not difficult    General Information and Staff    Patient location during procedure: OR  CRNA: Taty Puga CRNA    Indications and Patient Condition  Indications for airway management: airway protection    Preoxygenated: yes  Mask difficulty assessment: 2 - vent by mask + OA or adjuvant +/- NMBA    Final Airway Details  Final airway type: endotracheal airway      Successful airway: ETT  Cuffed: yes   Successful intubation technique: direct laryngoscopy  Facilitating devices/methods: Bougie  Endotracheal tube insertion site: oral  Blade: Castillo  Blade size: 2  Cormack-Lehane Classification: grade IIa - partial view of glottis  Placement verified by: chest auscultation and capnometry   Cuff volume (mL): 8  Number of attempts at approach: 2  Assessment: lips, teeth, and gum same as pre-op and atraumatic intubation    Additional Comments  PreO2 with 100% O2;  FeO2 >85%;  sniff position; easy mask ventilation;  Intubated with no difficultly after eyes taped; Appears atraumatic;  Lips and teeth intact as preop condition;  Airway secured. Connected to ventilator.

## 2021-02-26 NOTE — ANESTHESIA PREPROCEDURE EVALUATION
Anesthesia Evaluation     Patient summary reviewed and Nursing notes reviewed   no history of anesthetic complications:  NPO Solid Status: > 8 hours  NPO Liquid Status: > 2 hours           Airway   Mallampati: II  TM distance: >3 FB  Neck ROM: full  no difficulty expected  Dental - normal exam     Pulmonary - normal exam   (+) a smoker Current Smoked day of surgery,   (-) COPD, asthma, lung cancer  Cardiovascular - normal exam  Exercise tolerance: excellent (>7 METS)    ECG reviewed  Rhythm: regular  Rate: normal    (+) hypertension well controlled less than 2 medications,   (-) valvular problems/murmurs, past MI, CAD, dysrhythmias, angina, CHF, orthopnea, cardiac stents, CABG, pericardial effusion      Neuro/Psych  (+) headaches,     (-) seizures, TIA, CVA    ROS Comment: Frontal mass of brain  GI/Hepatic/Renal/Endo    (+)   thyroid problem hypothyroidism  (-)  obesity, morbid obesity, hiatal hernia, GERD, PUD, hepatitis, liver disease, no renal disease, diabetes, GI bleed    Musculoskeletal (-) negative ROS    Abdominal  - normal exam   Substance History - negative use     OB/GYN negative ob/gyn ROS         Other - negative ROS                       Anesthesia Plan    ASA 2     general   (GA w/ A line)  intravenous induction     Anesthetic plan, all risks, benefits, and alternatives have been provided, discussed and informed consent has been obtained with: patient.    Plan discussed with CRNA and attending.

## 2021-02-26 NOTE — ANESTHESIA POSTPROCEDURE EVALUATION
Patient: Laura Cuenca    Procedure Summary     Date: 02/26/21 Room / Location: [facility] OR  / [facility] MAIN OR    Anesthesia Start: 1127 Anesthesia Stop: 1608    Procedure: BIFRONTAL CRANIOTOMY FOR TUMOR STEREOTACTIC, WITH FAT GRAFTING (N/A Head) Diagnosis:       Meningioma (CMS/HCC)      (Meningioma (CMS/HCC) [D32.9])    Surgeon: Gume Carver MD Provider: Mendez Palacio MD    Anesthesia Type: general ASA Status: 2          Anesthesia Type: general    Vitals  Vitals Value Taken Time   /63 02/26/21 1800   Temp 37.6 °C (99.6 °F) 02/26/21 1603   Pulse 117 02/26/21 1810   Resp 16 02/26/21 1715   SpO2 97 % 02/26/21 1810   Vitals shown include unvalidated device data.        Post Anesthesia Care and Evaluation    Patient location during evaluation: bedside  Patient participation: complete - patient participated  Level of consciousness: awake and alert  Pain management: adequate  Airway patency: patent  Anesthetic complications: No anesthetic complications  PONV Status: controlled  Cardiovascular status: acceptable  Respiratory status: acceptable  Hydration status: acceptable

## 2021-02-26 NOTE — ANESTHESIA PROCEDURE NOTES
Arterial Line      Patient reassessed immediately prior to procedure    Patient location during procedure: holding area  Start time: 2/26/2021 10:11 AM  Stop Time:2/26/2021 10:16 AM       Line placed for hemodynamic monitoring and ABGs/Labs/ISTAT.  Performed By   Anesthesiologist: Romulo Stewart MD  Preanesthetic Checklist  Completed: patient identified, site marked, surgical consent, pre-op evaluation, timeout performed, IV checked, risks and benefits discussed and monitors and equipment checked  Arterial Line Prep   Sterile Tech: gloves, mask, cap and sterile barriers  Prep: ChloraPrep  Patient monitoring: blood pressure monitoring, continuous pulse oximetry and EKG  Arterial Line Procedure   Laterality:left  Location:  radial artery  Catheter size: 20 G   Guidance: ultrasound guided  PROCEDURE NOTE/ULTRASOUND INTERPRETATION.  Using ultrasound guidance the potential vascular sites for insertion of the catheter were visualized to determine the patency of the vessel to be used for vascular access.  After selecting the appropriate site for insertion, the needle was visualized under ultrasound being inserted into the radial artery, followed by ultrasound confirmation of wire and catheter placement. There were no abnormalities seen on ultrasound; an image was taken; and the patient tolerated the procedure with no complications.   Number of attempts: 2  Successful placement: yes  Post Assessment   Dressing Type: occlusive dressing applied, secured with tape and wrist guard applied.   Complications no  Circ/Move/Sens Assessment: normal and unchanged.   Patient Tolerance: patient tolerated the procedure well with no apparent complications  Additional Notes  Micropuncture set utilized

## 2021-02-27 ENCOUNTER — APPOINTMENT (OUTPATIENT)
Dept: CT IMAGING | Facility: HOSPITAL | Age: 41
End: 2021-02-27

## 2021-02-27 LAB
ANION GAP SERPL CALCULATED.3IONS-SCNC: 8.9 MMOL/L (ref 5–15)
APTT PPP: 26.3 SECONDS (ref 22.7–35.4)
BASOPHILS # BLD AUTO: 0.02 10*3/MM3 (ref 0–0.2)
BASOPHILS NFR BLD AUTO: 0.1 % (ref 0–1.5)
BUN SERPL-MCNC: 10 MG/DL (ref 6–20)
BUN/CREAT SERPL: 16.1 (ref 7–25)
CALCIUM SPEC-SCNC: 7.8 MG/DL (ref 8.6–10.5)
CHLORIDE SERPL-SCNC: 101 MMOL/L (ref 98–107)
CO2 SERPL-SCNC: 23.1 MMOL/L (ref 22–29)
CREAT SERPL-MCNC: 0.62 MG/DL (ref 0.57–1)
DEPRECATED RDW RBC AUTO: 42.2 FL (ref 37–54)
EOSINOPHIL # BLD AUTO: 0 10*3/MM3 (ref 0–0.4)
EOSINOPHIL NFR BLD AUTO: 0 % (ref 0.3–6.2)
ERYTHROCYTE [DISTWIDTH] IN BLOOD BY AUTOMATED COUNT: 13.6 % (ref 12.3–15.4)
GFR SERPL CREATININE-BSD FRML MDRD: 107 ML/MIN/1.73
GLUCOSE SERPL-MCNC: 147 MG/DL (ref 65–99)
HCT VFR BLD AUTO: 36.1 % (ref 34–46.6)
HGB BLD-MCNC: 12.4 G/DL (ref 12–15.9)
IMM GRANULOCYTES # BLD AUTO: 0.09 10*3/MM3 (ref 0–0.05)
IMM GRANULOCYTES NFR BLD AUTO: 0.6 % (ref 0–0.5)
INR PPP: 1.02 (ref 0.9–1.1)
LYMPHOCYTES # BLD AUTO: 1.06 10*3/MM3 (ref 0.7–3.1)
LYMPHOCYTES NFR BLD AUTO: 6.8 % (ref 19.6–45.3)
MAGNESIUM SERPL-MCNC: 1.8 MG/DL (ref 1.6–2.6)
MCH RBC QN AUTO: 29.4 PG (ref 26.6–33)
MCHC RBC AUTO-ENTMCNC: 34.3 G/DL (ref 31.5–35.7)
MCV RBC AUTO: 85.5 FL (ref 79–97)
MONOCYTES # BLD AUTO: 2.17 10*3/MM3 (ref 0.1–0.9)
MONOCYTES NFR BLD AUTO: 13.9 % (ref 5–12)
NEUTROPHILS NFR BLD AUTO: 12.3 10*3/MM3 (ref 1.7–7)
NEUTROPHILS NFR BLD AUTO: 78.6 % (ref 42.7–76)
NRBC BLD AUTO-RTO: 0 /100 WBC (ref 0–0.2)
PLATELET # BLD AUTO: 316 10*3/MM3 (ref 140–450)
PMV BLD AUTO: 10.4 FL (ref 6–12)
POTASSIUM SERPL-SCNC: 3.4 MMOL/L (ref 3.5–5.2)
POTASSIUM SERPL-SCNC: 4.4 MMOL/L (ref 3.5–5.2)
PROTHROMBIN TIME: 13.2 SECONDS (ref 11.7–14.2)
RBC # BLD AUTO: 4.22 10*6/MM3 (ref 3.77–5.28)
SODIUM SERPL-SCNC: 133 MMOL/L (ref 136–145)
WBC # BLD AUTO: 15.64 10*3/MM3 (ref 3.4–10.8)

## 2021-02-27 PROCEDURE — 85730 THROMBOPLASTIN TIME PARTIAL: CPT | Performed by: NEUROLOGICAL SURGERY

## 2021-02-27 PROCEDURE — 80048 BASIC METABOLIC PNL TOTAL CA: CPT | Performed by: NEUROLOGICAL SURGERY

## 2021-02-27 PROCEDURE — 83735 ASSAY OF MAGNESIUM: CPT | Performed by: INTERNAL MEDICINE

## 2021-02-27 PROCEDURE — 25010000003 CEFAZOLIN IN DEXTROSE 2-4 GM/100ML-% SOLUTION: Performed by: NEUROLOGICAL SURGERY

## 2021-02-27 PROCEDURE — 25010000002 HYDROMORPHONE 1 MG/ML SOLUTION: Performed by: INTERNAL MEDICINE

## 2021-02-27 PROCEDURE — 25010000003 POTASSIUM CHLORIDE 10 MEQ/100ML SOLUTION: Performed by: INTERNAL MEDICINE

## 2021-02-27 PROCEDURE — 85025 COMPLETE CBC W/AUTO DIFF WBC: CPT | Performed by: NEUROLOGICAL SURGERY

## 2021-02-27 PROCEDURE — 84132 ASSAY OF SERUM POTASSIUM: CPT | Performed by: INTERNAL MEDICINE

## 2021-02-27 PROCEDURE — 25010000002 ONDANSETRON PER 1 MG: Performed by: NEUROLOGICAL SURGERY

## 2021-02-27 PROCEDURE — 85610 PROTHROMBIN TIME: CPT | Performed by: NEUROLOGICAL SURGERY

## 2021-02-27 PROCEDURE — 25010000002 PROMETHAZINE PER 50 MG: Performed by: INTERNAL MEDICINE

## 2021-02-27 PROCEDURE — 99024 POSTOP FOLLOW-UP VISIT: CPT | Performed by: NEUROLOGICAL SURGERY

## 2021-02-27 PROCEDURE — 70450 CT HEAD/BRAIN W/O DYE: CPT

## 2021-02-27 PROCEDURE — 25010000002 HYDROMORPHONE PER 4 MG: Performed by: NEUROLOGICAL SURGERY

## 2021-02-27 PROCEDURE — 25810000003 SODIUM CHLORIDE 0.9 % WITH KCL 20 MEQ 20-0.9 MEQ/L-% SOLUTION: Performed by: NEUROLOGICAL SURGERY

## 2021-02-27 RX ADMIN — POTASSIUM CHLORIDE 10 MEQ: 7.46 INJECTION, SOLUTION INTRAVENOUS at 13:01

## 2021-02-27 RX ADMIN — HYDROMORPHONE HYDROCHLORIDE 1 MG: 1 INJECTION, SOLUTION INTRAMUSCULAR; INTRAVENOUS; SUBCUTANEOUS at 13:49

## 2021-02-27 RX ADMIN — CEFAZOLIN SODIUM 2 G: 2 INJECTION, SOLUTION INTRAVENOUS at 19:16

## 2021-02-27 RX ADMIN — AMITRIPTYLINE HYDROCHLORIDE 50 MG: 50 TABLET, FILM COATED ORAL at 21:00

## 2021-02-27 RX ADMIN — TOPIRAMATE 50 MG: 50 TABLET, FILM COATED ORAL at 21:00

## 2021-02-27 RX ADMIN — HYDROMORPHONE HYDROCHLORIDE 0.5 MG: 1 INJECTION, SOLUTION INTRAMUSCULAR; INTRAVENOUS; SUBCUTANEOUS at 00:14

## 2021-02-27 RX ADMIN — HYDROMORPHONE HYDROCHLORIDE 0.5 MG: 1 INJECTION, SOLUTION INTRAMUSCULAR; INTRAVENOUS; SUBCUTANEOUS at 07:04

## 2021-02-27 RX ADMIN — ONDANSETRON 4 MG: 2 INJECTION INTRAMUSCULAR; INTRAVENOUS at 03:10

## 2021-02-27 RX ADMIN — POTASSIUM CHLORIDE 10 MEQ: 7.46 INJECTION, SOLUTION INTRAVENOUS at 08:06

## 2021-02-27 RX ADMIN — POTASSIUM CHLORIDE 10 MEQ: 7.46 INJECTION, SOLUTION INTRAVENOUS at 10:52

## 2021-02-27 RX ADMIN — CEFAZOLIN SODIUM 2 G: 2 INJECTION, SOLUTION INTRAVENOUS at 10:52

## 2021-02-27 RX ADMIN — ONDANSETRON 4 MG: 2 INJECTION INTRAMUSCULAR; INTRAVENOUS at 09:16

## 2021-02-27 RX ADMIN — POTASSIUM CHLORIDE 10 MEQ: 7.46 INJECTION, SOLUTION INTRAVENOUS at 00:00

## 2021-02-27 RX ADMIN — POTASSIUM CHLORIDE 10 MEQ: 7.46 INJECTION, SOLUTION INTRAVENOUS at 09:28

## 2021-02-27 RX ADMIN — CEFAZOLIN SODIUM 2 G: 2 INJECTION, SOLUTION INTRAVENOUS at 04:21

## 2021-02-27 RX ADMIN — POTASSIUM CHLORIDE AND SODIUM CHLORIDE 100 ML/HR: 900; 150 INJECTION, SOLUTION INTRAVENOUS at 23:00

## 2021-02-27 RX ADMIN — PROMETHAZINE HYDROCHLORIDE 25 MG: 25 INJECTION INTRAMUSCULAR; INTRAVENOUS at 12:27

## 2021-02-27 RX ADMIN — POTASSIUM CHLORIDE AND SODIUM CHLORIDE 100 ML/HR: 900; 150 INJECTION, SOLUTION INTRAVENOUS at 10:32

## 2021-02-27 RX ADMIN — LEVOTHYROXINE SODIUM 125 MCG: 0.12 TABLET ORAL at 08:06

## 2021-02-27 RX ADMIN — SUMATRIPTAN SUCCINATE 50 MG: 50 TABLET ORAL at 21:00

## 2021-02-27 RX ADMIN — HYDROCHLOROTHIAZIDE 25 MG: 25 TABLET ORAL at 08:06

## 2021-02-27 RX ADMIN — HYDROMORPHONE HYDROCHLORIDE 1 MG: 1 INJECTION, SOLUTION INTRAMUSCULAR; INTRAVENOUS; SUBCUTANEOUS at 17:46

## 2021-02-27 RX ADMIN — POTASSIUM CHLORIDE 10 MEQ: 7.46 INJECTION, SOLUTION INTRAVENOUS at 02:33

## 2021-02-27 RX ADMIN — HYDROMORPHONE HYDROCHLORIDE 0.5 MG: 1 INJECTION, SOLUTION INTRAMUSCULAR; INTRAVENOUS; SUBCUTANEOUS at 03:09

## 2021-02-27 RX ADMIN — HYDROMORPHONE HYDROCHLORIDE 0.5 MG: 1 INJECTION, SOLUTION INTRAMUSCULAR; INTRAVENOUS; SUBCUTANEOUS at 09:16

## 2021-02-27 RX ADMIN — POTASSIUM CHLORIDE 10 MEQ: 7.46 INJECTION, SOLUTION INTRAVENOUS at 00:56

## 2021-02-27 RX ADMIN — PROMETHAZINE HYDROCHLORIDE 25 MG: 25 INJECTION INTRAMUSCULAR; INTRAVENOUS at 20:23

## 2021-02-28 LAB
ANION GAP SERPL CALCULATED.3IONS-SCNC: 7.1 MMOL/L (ref 5–15)
BASOPHILS # BLD AUTO: 0.03 10*3/MM3 (ref 0–0.2)
BASOPHILS NFR BLD AUTO: 0.2 % (ref 0–1.5)
BUN SERPL-MCNC: 7 MG/DL (ref 6–20)
BUN/CREAT SERPL: 13.2 (ref 7–25)
CALCIUM SPEC-SCNC: 8.1 MG/DL (ref 8.6–10.5)
CHLORIDE SERPL-SCNC: 108 MMOL/L (ref 98–107)
CO2 SERPL-SCNC: 20.9 MMOL/L (ref 22–29)
CREAT SERPL-MCNC: 0.53 MG/DL (ref 0.57–1)
DEPRECATED RDW RBC AUTO: 46.1 FL (ref 37–54)
EOSINOPHIL # BLD AUTO: 0 10*3/MM3 (ref 0–0.4)
EOSINOPHIL NFR BLD AUTO: 0 % (ref 0.3–6.2)
ERYTHROCYTE [DISTWIDTH] IN BLOOD BY AUTOMATED COUNT: 13.9 % (ref 12.3–15.4)
GFR SERPL CREATININE-BSD FRML MDRD: 128 ML/MIN/1.73
GLUCOSE BLDC GLUCOMTR-MCNC: 81 MG/DL (ref 70–130)
GLUCOSE SERPL-MCNC: 98 MG/DL (ref 65–99)
HCT VFR BLD AUTO: 35.6 % (ref 34–46.6)
HGB BLD-MCNC: 11.8 G/DL (ref 12–15.9)
IMM GRANULOCYTES # BLD AUTO: 0.12 10*3/MM3 (ref 0–0.05)
IMM GRANULOCYTES NFR BLD AUTO: 0.8 % (ref 0–0.5)
LYMPHOCYTES # BLD AUTO: 1.4 10*3/MM3 (ref 0.7–3.1)
LYMPHOCYTES NFR BLD AUTO: 9.8 % (ref 19.6–45.3)
MCH RBC QN AUTO: 30.1 PG (ref 26.6–33)
MCHC RBC AUTO-ENTMCNC: 33.1 G/DL (ref 31.5–35.7)
MCV RBC AUTO: 90.8 FL (ref 79–97)
MONOCYTES # BLD AUTO: 2.02 10*3/MM3 (ref 0.1–0.9)
MONOCYTES NFR BLD AUTO: 14.2 % (ref 5–12)
NEUTROPHILS NFR BLD AUTO: 10.65 10*3/MM3 (ref 1.7–7)
NEUTROPHILS NFR BLD AUTO: 75 % (ref 42.7–76)
NRBC BLD AUTO-RTO: 0 /100 WBC (ref 0–0.2)
PLATELET # BLD AUTO: 279 10*3/MM3 (ref 140–450)
PMV BLD AUTO: 10.5 FL (ref 6–12)
POTASSIUM SERPL-SCNC: 4 MMOL/L (ref 3.5–5.2)
RBC # BLD AUTO: 3.92 10*6/MM3 (ref 3.77–5.28)
SODIUM SERPL-SCNC: 136 MMOL/L (ref 136–145)
WBC # BLD AUTO: 14.22 10*3/MM3 (ref 3.4–10.8)

## 2021-02-28 PROCEDURE — 63710000001 ONDANSETRON PER 8 MG: Performed by: NEUROLOGICAL SURGERY

## 2021-02-28 PROCEDURE — 80048 BASIC METABOLIC PNL TOTAL CA: CPT | Performed by: NEUROLOGICAL SURGERY

## 2021-02-28 PROCEDURE — 25010000002 HYDROMORPHONE 1 MG/ML SOLUTION: Performed by: INTERNAL MEDICINE

## 2021-02-28 PROCEDURE — 25010000003 CEFAZOLIN IN DEXTROSE 2-4 GM/100ML-% SOLUTION: Performed by: NEUROLOGICAL SURGERY

## 2021-02-28 PROCEDURE — 85025 COMPLETE CBC W/AUTO DIFF WBC: CPT | Performed by: NEUROLOGICAL SURGERY

## 2021-02-28 PROCEDURE — 99024 POSTOP FOLLOW-UP VISIT: CPT | Performed by: NEUROLOGICAL SURGERY

## 2021-02-28 PROCEDURE — 82962 GLUCOSE BLOOD TEST: CPT

## 2021-02-28 RX ADMIN — CEFAZOLIN SODIUM 2 G: 2 INJECTION, SOLUTION INTRAVENOUS at 11:21

## 2021-02-28 RX ADMIN — SUMATRIPTAN SUCCINATE 50 MG: 50 TABLET ORAL at 05:19

## 2021-02-28 RX ADMIN — ONDANSETRON HYDROCHLORIDE 4 MG: 4 TABLET, FILM COATED ORAL at 08:33

## 2021-02-28 RX ADMIN — HYDROCODONE BITARTRATE AND ACETAMINOPHEN 1 TABLET: 5; 325 TABLET ORAL at 18:52

## 2021-02-28 RX ADMIN — TOPIRAMATE 50 MG: 50 TABLET, FILM COATED ORAL at 22:32

## 2021-02-28 RX ADMIN — HYDROCODONE BITARTRATE AND ACETAMINOPHEN 1 TABLET: 5; 325 TABLET ORAL at 08:33

## 2021-02-28 RX ADMIN — HYDROMORPHONE HYDROCHLORIDE 1 MG: 1 INJECTION, SOLUTION INTRAMUSCULAR; INTRAVENOUS; SUBCUTANEOUS at 14:57

## 2021-02-28 RX ADMIN — HYDROCODONE BITARTRATE AND ACETAMINOPHEN 1 TABLET: 5; 325 TABLET ORAL at 13:22

## 2021-02-28 RX ADMIN — LEVOTHYROXINE SODIUM 125 MCG: 0.12 TABLET ORAL at 08:33

## 2021-02-28 RX ADMIN — HYDROCHLOROTHIAZIDE 25 MG: 25 TABLET ORAL at 08:33

## 2021-02-28 RX ADMIN — HYDROMORPHONE HYDROCHLORIDE 1 MG: 1 INJECTION, SOLUTION INTRAMUSCULAR; INTRAVENOUS; SUBCUTANEOUS at 00:20

## 2021-02-28 RX ADMIN — HYDROMORPHONE HYDROCHLORIDE 1 MG: 1 INJECTION, SOLUTION INTRAMUSCULAR; INTRAVENOUS; SUBCUTANEOUS at 21:03

## 2021-02-28 RX ADMIN — AMITRIPTYLINE HYDROCHLORIDE 50 MG: 50 TABLET, FILM COATED ORAL at 22:32

## 2021-02-28 RX ADMIN — CEFAZOLIN SODIUM 2 G: 2 INJECTION, SOLUTION INTRAVENOUS at 03:12

## 2021-03-01 ENCOUNTER — APPOINTMENT (OUTPATIENT)
Dept: MRI IMAGING | Facility: HOSPITAL | Age: 41
End: 2021-03-01

## 2021-03-01 LAB
ANION GAP SERPL CALCULATED.3IONS-SCNC: 11.1 MMOL/L (ref 5–15)
BASOPHILS # BLD AUTO: 0.04 10*3/MM3 (ref 0–0.2)
BASOPHILS NFR BLD AUTO: 0.4 % (ref 0–1.5)
BUN SERPL-MCNC: 7 MG/DL (ref 6–20)
BUN/CREAT SERPL: 13.7 (ref 7–25)
CALCIUM SPEC-SCNC: 8.9 MG/DL (ref 8.6–10.5)
CHLORIDE SERPL-SCNC: 101 MMOL/L (ref 98–107)
CO2 SERPL-SCNC: 21.9 MMOL/L (ref 22–29)
CREAT SERPL-MCNC: 0.51 MG/DL (ref 0.57–1)
DEPRECATED RDW RBC AUTO: 43.8 FL (ref 37–54)
EOSINOPHIL # BLD AUTO: 0.07 10*3/MM3 (ref 0–0.4)
EOSINOPHIL NFR BLD AUTO: 0.7 % (ref 0.3–6.2)
ERYTHROCYTE [DISTWIDTH] IN BLOOD BY AUTOMATED COUNT: 14 % (ref 12.3–15.4)
GFR SERPL CREATININE-BSD FRML MDRD: 134 ML/MIN/1.73
GLUCOSE SERPL-MCNC: 91 MG/DL (ref 65–99)
HCT VFR BLD AUTO: 33.2 % (ref 34–46.6)
HGB BLD-MCNC: 11.3 G/DL (ref 12–15.9)
IMM GRANULOCYTES # BLD AUTO: 0.09 10*3/MM3 (ref 0–0.05)
IMM GRANULOCYTES NFR BLD AUTO: 0.8 % (ref 0–0.5)
LYMPHOCYTES # BLD AUTO: 1.79 10*3/MM3 (ref 0.7–3.1)
LYMPHOCYTES NFR BLD AUTO: 16.6 % (ref 19.6–45.3)
MCH RBC QN AUTO: 29.4 PG (ref 26.6–33)
MCHC RBC AUTO-ENTMCNC: 34 G/DL (ref 31.5–35.7)
MCV RBC AUTO: 86.2 FL (ref 79–97)
MONOCYTES # BLD AUTO: 1.33 10*3/MM3 (ref 0.1–0.9)
MONOCYTES NFR BLD AUTO: 12.4 % (ref 5–12)
NEUTROPHILS NFR BLD AUTO: 69.1 % (ref 42.7–76)
NEUTROPHILS NFR BLD AUTO: 7.44 10*3/MM3 (ref 1.7–7)
NRBC BLD AUTO-RTO: 0.1 /100 WBC (ref 0–0.2)
PLATELET # BLD AUTO: 311 10*3/MM3 (ref 140–450)
PMV BLD AUTO: 10.2 FL (ref 6–12)
POTASSIUM SERPL-SCNC: 3.6 MMOL/L (ref 3.5–5.2)
RBC # BLD AUTO: 3.85 10*6/MM3 (ref 3.77–5.28)
SODIUM SERPL-SCNC: 134 MMOL/L (ref 136–145)
WBC # BLD AUTO: 10.76 10*3/MM3 (ref 3.4–10.8)

## 2021-03-01 PROCEDURE — 25010000002 HYDROMORPHONE 1 MG/ML SOLUTION: Performed by: INTERNAL MEDICINE

## 2021-03-01 PROCEDURE — 0 GADOBENATE DIMEGLUMINE 529 MG/ML SOLUTION: Performed by: NEUROLOGICAL SURGERY

## 2021-03-01 PROCEDURE — 80048 BASIC METABOLIC PNL TOTAL CA: CPT | Performed by: NEUROLOGICAL SURGERY

## 2021-03-01 PROCEDURE — 85025 COMPLETE CBC W/AUTO DIFF WBC: CPT | Performed by: NEUROLOGICAL SURGERY

## 2021-03-01 PROCEDURE — 99024 POSTOP FOLLOW-UP VISIT: CPT | Performed by: NURSE PRACTITIONER

## 2021-03-01 PROCEDURE — 70553 MRI BRAIN STEM W/O & W/DYE: CPT

## 2021-03-01 PROCEDURE — A9577 INJ MULTIHANCE: HCPCS | Performed by: NEUROLOGICAL SURGERY

## 2021-03-01 RX ORDER — CYCLOBENZAPRINE HCL 10 MG
10 TABLET ORAL 3 TIMES DAILY PRN
Status: DISCONTINUED | OUTPATIENT
Start: 2021-03-01 | End: 2021-03-02 | Stop reason: HOSPADM

## 2021-03-01 RX ADMIN — LEVOTHYROXINE SODIUM 125 MCG: 0.12 TABLET ORAL at 09:11

## 2021-03-01 RX ADMIN — AMITRIPTYLINE HYDROCHLORIDE 50 MG: 50 TABLET, FILM COATED ORAL at 20:25

## 2021-03-01 RX ADMIN — CYCLOBENZAPRINE 10 MG: 10 TABLET, FILM COATED ORAL at 11:14

## 2021-03-01 RX ADMIN — HYDROCODONE BITARTRATE AND ACETAMINOPHEN 1 TABLET: 5; 325 TABLET ORAL at 20:25

## 2021-03-01 RX ADMIN — HYDROMORPHONE HYDROCHLORIDE 1 MG: 1 INJECTION, SOLUTION INTRAMUSCULAR; INTRAVENOUS; SUBCUTANEOUS at 06:13

## 2021-03-01 RX ADMIN — GADOBENATE DIMEGLUMINE 14 ML: 529 INJECTION, SOLUTION INTRAVENOUS at 17:14

## 2021-03-01 RX ADMIN — HYDROCODONE BITARTRATE AND ACETAMINOPHEN 1 TABLET: 5; 325 TABLET ORAL at 14:34

## 2021-03-01 RX ADMIN — HYDROCHLOROTHIAZIDE 25 MG: 25 TABLET ORAL at 09:11

## 2021-03-01 RX ADMIN — TOPIRAMATE 50 MG: 50 TABLET, FILM COATED ORAL at 20:25

## 2021-03-01 NOTE — PLAN OF CARE
Goal Outcome Evaluation:        Outcome Summary: Diet not advanced/ Pt not eating much. Ambulated around unit / NA. VSS. Pt uncle Duke updated at bedside

## 2021-03-01 NOTE — PROGRESS NOTES
Discharge Planning Assessment  [facility]     Patient Name: Laura Cuenca  MRN: [id number]  Today's Date: 3/1/2021    Admit Date: 2/26/2021    Discharge Needs Assessment     Row Name 03/01/21 1038       Living Environment    Lives With  other relative(s)    Name(s) of Who Lives With Patient  Duke Zepeda (Uncle)    Current Living Arrangements  home/apartment/condo    Potentially Unsafe Housing Conditions  other (see comments) no concerns    Primary Care Provided by  self    Provides Primary Care For  no one    Family Caregiver if Needed  other relative(s)    Quality of Family Relationships  involved;helpful;supportive    Able to Return to Prior Arrangements  yes       Resource/Environmental Concerns    Resource/Environmental Concerns  none    Home Accessibility Concerns  stairs to enter home    Transportation Concerns  car, none       Transition Planning    Patient/Family Anticipates Transition to  home    Patient/Family Anticipated Services at Transition  none    Transportation Anticipated  family or friend will provide       Discharge Needs Assessment    Readmission Within the Last 30 Days  no previous admission in last 30 days    Concerns to be Addressed  no discharge needs identified;denies needs/concerns at this time    Anticipated Changes Related to Illness  none    Equipment Needed After Discharge  none        Discharge Plan     Row Name 03/01/21 1039       Plan    Plan  Home-follow PT eval    Patient/Family in Agreement with Plan  yes    Plan Comments  CCP met with patient at bedside. CCP role explained and discharge planning discussed. Face sheet verified and patient’s PCP is Dhara Garcia. Patient lives with her uncle in a second floor apartment (handrails present). Patient is independent with her ADLs and does not use DME. Patient has not used home health or been to SNF. Patient’s preferred pharmacy is [facility]. Patient plans to return home and states her uncle can assist her as  needed. PT eval ordered today. CCP will follow for PT eval and discuss recommendations with patient and assist with home health referral or DME if needed. Laxmi CHEN        Continued Care and Services - Admitted Since 2/26/2021    Coordination has not been started for this encounter.         Demographic Summary     Row Name 03/01/21 1037       General Information    Admission Type  inpatient    Arrived From  physician office    Referral Source  admission list    Reason for Consult  discharge planning    Preferred Language  English     Used During This Interaction  no        Functional Status     Row Name 03/01/21 1038       Functional Status    Usual Activity Tolerance  good    Current Activity Tolerance  good       Functional Status, IADL    Medications  independent    Meal Preparation  independent    Housekeeping  independent    Laundry  independent    Shopping  independent       Mental Status    General Appearance WDL  WDL       Mental Status Summary    Recent Changes in Mental Status/Cognitive Functioning  no changes        Psychosocial    No documentation.       Abuse/Neglect    No documentation.       Legal    No documentation.       Substance Abuse    No documentation.       Patient Forms    No documentation.           APRIL Wilkes

## 2021-03-01 NOTE — PROGRESS NOTES
"  [facility] NEUROSURGERY PROGRESS NOTE    PATIENT IDENTIFICATION:   Name:  Laura Cuenca      MRN:  [id number]     40 y.o.  female               CC: Headache, migraine.       Subjective     Interval History:  POD #3  Bifrontal craniotomy with gross total removal of a dermoid tumor. C/O's of ongoing headache with photophobia and frequent interruptions of sleep. Reports hx of migraines.  Still on liquid diet and only been up in chair once.  Denies nausea at this time. Reports no emesis x 24 hrs. Voiding without difficulty. Not requesting pain medication states    \"I was trying to wait it out. The Imitrex does not work.\"  No vision changes or double vision, numbness/tingling or weakness.      ROS:  Constitutional: No fever, chills  HEENT: + headache, no vision changes, no tinnitus, no hearing difficulties  GI: No nausea, vomiting, no swallow difficulties  Neuro: No numbness, tingling, or weakness, no speech difficulties, no balance difficulties    Objective     Vital signs in last 24 hours:  Temp:  [98.1 °F (36.7 °C)-99.1 °F (37.3 °C)] 99.1 °F (37.3 °C)  Heart Rate:  [] 93  Resp:  [16-18] 17  BP: (111-140)/(79-90) 116/81      Intake/Output this shift:  No intake/output data recorded.  No drains    Intake/Output last 3 shifts:  I/O last 3 completed shifts:  In: 1511 [P.O.:270; I.V.:1241]  Out: 0     LABS:       Results from last 7 days   Lab Units 03/01/21  0617 02/28/21 0754 02/27/21  0422   WBC 10*3/mm3 10.76 14.22* 15.64*   HEMOGLOBIN g/dL 11.3* 11.8* 12.4   HEMATOCRIT % 33.2* 35.6 36.1   PLATELETS 10*3/mm3 311 279 316     Results from last 7 days   Lab Units 03/01/21  0617 02/28/21  0754 02/27/21  1737 02/27/21  0422   SODIUM mmol/L 134* 136  --  133*   POTASSIUM mmol/L 3.6 4.0 4.4 3.4*   CHLORIDE mmol/L 101 108*  --  101   CO2 mmol/L 21.9* 20.9*  --  23.1   BUN mg/dL 7 7  --  10   CREATININE mg/dL 0.51* 0.53*  --  0.62   CALCIUM mg/dL 8.9 8.1*  --  7.8*   GLUCOSE mg/dL 91 98  --  147*         IMAGING " STUDIES:    3/1/21 MRI brain pending.     Meds reviewed/changed: Yes     Scheduled Meds:amitriptyline, 50 mg, Oral, Nightly  hydroCHLOROthiazide, 25 mg, Oral, Daily  levothyroxine, 125 mcg, Oral, Daily  topiramate, 50 mg, Oral, Nightly      Continuous Infusions:   PRN Meds:.cyclobenzaprine  •  HYDROcodone-acetaminophen  •  ondansetron **OR** ondansetron  •  potassium chloride **OR** potassium chloride **OR** potassium chloride  •  promethazine  •  SUMAtriptan       Physical Exam   Constitutional: She is well-developed, well-nourished, and in no distress. No distress.   Cardiovascular: Normal rate.   Pulmonary/Chest: No respiratory distress.     Physical Exam:    General:  Awake, alert, oriented x3. Speech clear with no aphasia  HEENT:   Cervical neck tenderness to palpation, Bifrontal craniotomy incision well approximated,   without redness or drainage.    CN III IV VI: Extraocular movements are full , PERRL   CN V:     Normal facial sensation and strength of muscles of mastication.  CN VII:   Facial movements are symmetric. No weakness.  Motor:   Moving extremities x4 without difficulty. Pronator drift absent.    Reflexes:  Plantar responses are flexor.  Sensation:  Normal to light touch; no extinction  Coordination: Finger-to-nose test shows no dysmetria.   Extremities: No SCDs, no swelling, or calf tenderness.   ABD:   LLQ incision site edges well approximated without redness or drainage.       Assessment/Plan     ASSESSMENT:      Dermoid cyst of brain (CMS/HCC)    Incisions look good. WBC 10.76 today.  Patient in room with lights off and ice pack on head. Educated patient on need to ask for pain medication and will add Flexeril for neck spasms. Will keep today for more pain control with oral medication, increase mobility, and advancing of diet.  Plan for discharge home in 1-2 days.      PLAN:     CBC in AM  PT consulted  Up in chair at all meals  Flexeril TID PRN for muscle spasms  Advance diet as tolerated.  "  Pending MRI today    I discussed the patient's findings and my recommendations with patient, nursing staff, Cori Machuca, and Dr Carver.        LOS: 3 days        Tatiana Johnson, APRN  3/1/2021  12:13 EST    \"Dictated utilizing Dragon dictation\".    "

## 2021-03-02 VITALS
HEART RATE: 82 BPM | DIASTOLIC BLOOD PRESSURE: 79 MMHG | SYSTOLIC BLOOD PRESSURE: 117 MMHG | HEIGHT: 60 IN | OXYGEN SATURATION: 94 % | WEIGHT: 149.69 LBS | RESPIRATION RATE: 18 BRPM | TEMPERATURE: 98 F | BODY MASS INDEX: 29.39 KG/M2

## 2021-03-02 LAB
ANION GAP SERPL CALCULATED.3IONS-SCNC: 15 MMOL/L (ref 5–15)
BASOPHILS # BLD AUTO: 0.03 10*3/MM3 (ref 0–0.2)
BASOPHILS NFR BLD AUTO: 0.4 % (ref 0–1.5)
BUN SERPL-MCNC: 12 MG/DL (ref 6–20)
BUN/CREAT SERPL: 23.1 (ref 7–25)
CALCIUM SPEC-SCNC: 9.2 MG/DL (ref 8.6–10.5)
CHLORIDE SERPL-SCNC: 97 MMOL/L (ref 98–107)
CO2 SERPL-SCNC: 22 MMOL/L (ref 22–29)
CREAT SERPL-MCNC: 0.52 MG/DL (ref 0.57–1)
DEPRECATED RDW RBC AUTO: 46.3 FL (ref 37–54)
EOSINOPHIL # BLD AUTO: 0.09 10*3/MM3 (ref 0–0.4)
EOSINOPHIL NFR BLD AUTO: 1.1 % (ref 0.3–6.2)
ERYTHROCYTE [DISTWIDTH] IN BLOOD BY AUTOMATED COUNT: 14.2 % (ref 12.3–15.4)
GFR SERPL CREATININE-BSD FRML MDRD: 131 ML/MIN/1.73
GLUCOSE SERPL-MCNC: 87 MG/DL (ref 65–99)
HCT VFR BLD AUTO: 38.3 % (ref 34–46.6)
HGB BLD-MCNC: 13.1 G/DL (ref 12–15.9)
IMM GRANULOCYTES # BLD AUTO: 0.11 10*3/MM3 (ref 0–0.05)
IMM GRANULOCYTES NFR BLD AUTO: 1.3 % (ref 0–0.5)
LYMPHOCYTES # BLD AUTO: 1.95 10*3/MM3 (ref 0.7–3.1)
LYMPHOCYTES NFR BLD AUTO: 23.7 % (ref 19.6–45.3)
MCH RBC QN AUTO: 30.5 PG (ref 26.6–33)
MCHC RBC AUTO-ENTMCNC: 34.2 G/DL (ref 31.5–35.7)
MCV RBC AUTO: 89.3 FL (ref 79–97)
MONOCYTES # BLD AUTO: 1.09 10*3/MM3 (ref 0.1–0.9)
MONOCYTES NFR BLD AUTO: 13.2 % (ref 5–12)
NEUTROPHILS NFR BLD AUTO: 4.97 10*3/MM3 (ref 1.7–7)
NEUTROPHILS NFR BLD AUTO: 60.3 % (ref 42.7–76)
NRBC BLD AUTO-RTO: 0.1 /100 WBC (ref 0–0.2)
PLATELET # BLD AUTO: 357 10*3/MM3 (ref 140–450)
PMV BLD AUTO: 10.5 FL (ref 6–12)
POTASSIUM SERPL-SCNC: 3.2 MMOL/L (ref 3.5–5.2)
RBC # BLD AUTO: 4.29 10*6/MM3 (ref 3.77–5.28)
SODIUM SERPL-SCNC: 134 MMOL/L (ref 136–145)
WBC # BLD AUTO: 8.24 10*3/MM3 (ref 3.4–10.8)

## 2021-03-02 PROCEDURE — 99024 POSTOP FOLLOW-UP VISIT: CPT | Performed by: NURSE PRACTITIONER

## 2021-03-02 PROCEDURE — 80048 BASIC METABOLIC PNL TOTAL CA: CPT | Performed by: NEUROLOGICAL SURGERY

## 2021-03-02 PROCEDURE — 85025 COMPLETE CBC W/AUTO DIFF WBC: CPT | Performed by: NEUROLOGICAL SURGERY

## 2021-03-02 RX ORDER — CYCLOBENZAPRINE HCL 10 MG
10 TABLET ORAL 3 TIMES DAILY PRN
Qty: 12 TABLET | Refills: 0 | Status: SHIPPED | OUTPATIENT
Start: 2021-03-02 | End: 2022-05-09

## 2021-03-02 RX ORDER — DOCUSATE SODIUM 100 MG/1
100 CAPSULE, LIQUID FILLED ORAL DAILY
Status: DISCONTINUED | OUTPATIENT
Start: 2021-03-02 | End: 2021-03-02 | Stop reason: HOSPADM

## 2021-03-02 RX ORDER — PSEUDOEPHEDRINE HCL 30 MG
100 TABLET ORAL DAILY
COMMUNITY
Start: 2021-03-03 | End: 2022-05-09

## 2021-03-02 RX ORDER — HYDROCODONE BITARTRATE AND ACETAMINOPHEN 5; 325 MG/1; MG/1
1 TABLET ORAL EVERY 6 HOURS PRN
Qty: 35 TABLET | Refills: 0 | Status: SHIPPED | OUTPATIENT
Start: 2021-03-02 | End: 2022-05-11

## 2021-03-02 RX ORDER — AMOXICILLIN 250 MG
2 CAPSULE ORAL NIGHTLY
Status: DISCONTINUED | OUTPATIENT
Start: 2021-03-02 | End: 2021-03-02 | Stop reason: HOSPADM

## 2021-03-02 RX ADMIN — CYCLOBENZAPRINE 10 MG: 10 TABLET, FILM COATED ORAL at 07:42

## 2021-03-02 RX ADMIN — DOCUSATE SODIUM 100 MG: 100 CAPSULE, LIQUID FILLED ORAL at 10:01

## 2021-03-02 RX ADMIN — HYDROCODONE BITARTRATE AND ACETAMINOPHEN 1 TABLET: 5; 325 TABLET ORAL at 10:01

## 2021-03-02 RX ADMIN — CYCLOBENZAPRINE 10 MG: 10 TABLET, FILM COATED ORAL at 15:53

## 2021-03-02 RX ADMIN — POTASSIUM CHLORIDE 40 MEQ: 750 TABLET, EXTENDED RELEASE ORAL at 10:01

## 2021-03-02 RX ADMIN — HYDROCODONE BITARTRATE AND ACETAMINOPHEN 1 TABLET: 5; 325 TABLET ORAL at 01:11

## 2021-03-02 RX ADMIN — HYDROCHLOROTHIAZIDE 25 MG: 25 TABLET ORAL at 07:43

## 2021-03-02 RX ADMIN — LEVOTHYROXINE SODIUM 125 MCG: 0.12 TABLET ORAL at 07:43

## 2021-03-02 NOTE — DISCHARGE INSTRUCTIONS
[facility]rological Surgery    [address], [address]    [place] [address]    Phone: [phone]    Fax: [phone]    Gume Carver M.D., F.A.C.S.          CRANIOTOMY POST-OPERATIVE INSTRUCTIONS    1. You should have a post-operative appointment scheduled for 2 to 2 ½ weeks after surgery with our Physician Assistant or Nurse Practitioner. If you do not, please call the office when you return home from the hospital to schedule this appointment.    2. You may ride home from the hospital in a car. You may not drive a vehicle prior to your first post-operative appointment.    3. Remove the dressing(s) over the wound(s) when you arrive home unless otherwise indicated. If you are instructed to keep your incision(s) covered, you need to use a loose dressing. Do not dress the incision too tight.    4. You may wash your hair with a mild shampoo; however, you cannot let the force of the water touch the incision(s) directly. Pat and dry gently.    5. You must clean your incision(s) twice a day with hydrogen peroxide with a cotton ball unless otherwise instructed.    6. If you experience any abnormal swelling, redness, drainage at the incision site or fever or chills you must call our office immediately. Please call the office if you experience a severe headache, nausea, or vomiting.    7. Activities are as tolerated. No straining or lifting greater than 15 pounds and no bending over.    8. Your prescription for pain medication may be refilled for only half the original amount prior to your return office visit. In order to have this medication refilled you must contact the office four days prior to the due date.

## 2021-03-02 NOTE — NURSING NOTE
Pt had DC orders pending PT consult for DC. PT order was placed on 3/1/21. Pt was not seen by PT on 8/1/21 or 8/2/21. This RN asked PT on unit if they would see the Pt today for DC and was told the Pt was not on their list. At that time this RN called and left a message for the PT [facility] office that DC was pend their evaluation. This RN never recived a response. This RN has placed a call to MD to notify of DC orders not being met. This RN is currently waiting a response from MD office

## 2021-03-03 NOTE — PROGRESS NOTES
Case Management Discharge Note      Final Note: Discharged home with family support.         Selected Continued Care - Discharged on 3/2/2021 Admission date: 2/26/2021 - Discharge disposition: Home or Self Care    Destination    No services have been selected for the patient.              Durable Medical Equipment    No services have been selected for the patient.              Dialysis/Infusion    No services have been selected for the patient.              Home Medical Care    No services have been selected for the patient.              Therapy    No services have been selected for the patient.              Community Resources    No services have been selected for the patient.                  Transportation Services  Private: Car    Final Discharge Disposition Code: 01 - home or self-care

## 2021-03-03 NOTE — PAYOR COMM NOTE
"DISCHARGED    REF #[id number]        Adrianna Cuenca (40 y.o. Female)     Date of Birth Social Security Number Address Home Phone MRN    [id number]  [id number]  [place] [address] [phone] [id number]    [employer] Mar[employer] Status          None Single       Admission Date Admission Type Admitting Provider Attending Provider Department, Room/Bed    2/26/21 Elective Gume Grace MD  [address], P592/1    Discharge Date Discharge Disposition Discharge Destination        3/2/2021 Home or Self Care              Attending Provider: (none)   Allergies: Egg White [Albumen, Egg], Pollen Extract, Wheat, Percocet [Oxycodone-acetaminophen]    Isolation: None   Infection: None   Code Status: Prior    Ht: 152.4 cm (60\")   Wt: 67.9 kg (149 lb 11.1 oz)    Admission Cmt: None   Principal Problem: 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]                 Active Insurance as of 2/26/2021     Primary Coverage     Payor Plan Insurance Group Employer/Plan Group    [employer] BLUE CROSS [employer] BLUE CROSS BLUE [employer]IELD PPO [id number]     Payor Plan Address Payor Plan Phone Number Payor Plan Fax Number Effective Dates    [id number] [phone]  1/1/2019 - None Entered    [place] [address]       Subscriber Name Subscriber Birth Date Member ID       ADRIANNA CUENCA 1980 [id number]                 Emergency Contacts      (Rel.) Home Phone Work Phone Mobile Phone    EVANGELIST ROWAN--UNCLE (Relative) [phone] -- [phone]            Discharge Order (From admission, onward)     Start     Ordered    03/02/21 1219  Discharge patient  Once     Expected Discharge Date: 03/02/21    Discharge Disposition: Home or Self Care    Physician of Record for Attribution - Please select from Treatment Team: GUME GRACE [[id number]]    Review needed by CMO to determine Physician of Record: No       Question Answer Comment   Physician of Record for Attribution - Please select from Treatment Team LESLY, " JASEN DUFF.    Review needed by CMO to determine Physician of Record No        03/02/21 1225                 Agree with above, Ramy Layton MD, FACEP  Ramy Layton MD, FACEP: In this physician's medical judgement based on clinical history and physical exam, patient with Bcell symptoms but normal results on cbc, patient instructed to follow up closely.  The patient was serially evaluated throughout emergency department course. There was no acute deterioration up to this time in the department. Patient has demonstrated clinical improvement and is stable, feels better at this time according to emergency department team. Agree with goals/plan of emergency department care as described in this physician's electronic medical record, including diagnostics, therapeutics and consultation as clinically warranted. Will discharge home with close outpatient follow up with primary care physician/provider and specialist if necessary. The patient and/or family was educated on concerning signs and features to return to the emergency department, in layman terms, including but not limited to: nausea, vomiting, fever, chills, persistent/worsening symptoms or any concerns at all. No immediate life threatening issues present on history, clinical exam, or any diagnostic evaluation. The patient is a safe disposition home, has capacity and insight into their condition, is ambulatory in the Emergency Department with no further questions and will follow up with their doctor(s) this week. The patient and/or family were given the opportunity to ask questions and have them answered in full. The patient and/or family are with capacity and insight into the situation, treatment, risks, benefits, alternative therapies, and understand that they can ask any further questions if needed. Patient and/or family/guardian understands anticipatory guidance and was given strict return and follow up precautions. The patient and/or family/guardian has been informed, in layman terms, of all concerning signs and symptoms to return to Emergency Department, the necessity to follow up with the PMD/Clinic/follow up provided within 2-3 days was explained, and the patient and/or family/guardian reports understanding of above with capacity and insight. The patient and/or family/guardian were informed of any results of their tests and are were encouraged to follow up on the findings with their doctor as well as the need to inform their doctor of any results. The patient and/or family/guardian are aware of the need to follow up with repeat testing as applicable and report understanding of the above with capacity and insight. The patient and/or family/guardian was made aware of any pending test results at the time of discharge and of the need to call back for the final results a well as the need to inform their doctor of the results.

## 2021-03-04 ENCOUNTER — TELEPHONE (OUTPATIENT)
Dept: NEUROSURGERY | Facility: CLINIC | Age: 41
End: 2021-03-04

## 2021-03-04 NOTE — TELEPHONE ENCOUNTER
How are you feeling? eating for the first time since last Wednesday. Feeling very emotional about the whole situation. Very teary. She feels like she has PTSD from her brain surgery experience. She states she's had panic attacks thinking about it. I suggested that if her anxiety continues as it is now, she should call her PCP.    Are you having any pain? Where? Just a little sore    Rate pain from 1-10 - N/A    Are you taking the pain RX? Ibuprofen    Do you think it's helping? yes    Do you feel better than before surgery? Headaches are gone    Dermabond OK? yes    Is you incision red, swollen or bleeding?  None, no fever    Are you having any trouble with nausea or constipation? none    Were your discharge instructions easy to understand? yes    Any other questions?    Confirm post op appt -  yes

## 2021-03-05 LAB
DX PRELIMINARY: NORMAL
LAB AP CASE REPORT: NORMAL
LAB AP DIAGNOSIS COMMENT: NORMAL
PATH REPORT.FINAL DX SPEC: NORMAL
PATH REPORT.GROSS SPEC: NORMAL

## 2021-03-12 NOTE — PROGRESS NOTES
"Subjective   Patient ID: Laura Cuenca is a 40 y.o. female is here today for 3 week post op follow-up. Patient had BIFRONTAL CRANIOTOMY FOR TUMOR STEREOTACTIC, WITH FAT GRAFTING on 02.26.2021.    Today patient is a having a constant HA, and she has been very fatigued. Patient incision looks healthy no redness, no drainage, no swelling.     Patient, Provider, and MA are all wearing a mask in our office today.   .  History of Present Illness     This patient returns today.  She is doing well.  Both her head and her abdominal incision look okay.  She has some headache but it is not severe.  She has a number of other odd sensations in her scalp which are of no significance.    I did review her neuro pathology and it was in fact a dermoid.    The following portions of the patient's history were reviewed and updated as appropriate: allergies, current medications, past family history, past medical history, past social history, past surgical history and problem list.    Review of Systems   Constitutional: Positive for fatigue. Negative for chills and fever.   HENT: Negative for congestion.    Musculoskeletal: Negative for neck pain and neck stiffness.   Neurological: Positive for headaches. Negative for weakness.       I have reviewed the review of systems as documented by my MA.      Objective     Vitals:    03/18/21 1147   BP: 130/70   Cuff Size: Adult   Pulse: 78   Temp: 98 °F (36.7 °C)   Weight: 67.9 kg (149 lb 11.1 oz)   Height: 152.4 cm (60\")     Body mass index is 29.24 kg/m².      Physical Exam  Neurological:      Mental Status: She is alert and oriented to person, place, and time.       Neurologic Exam     Mental Status   Oriented to person, place, and time.           Assessment/Plan   Independent Review of Radiographic Studies:      I personally reviewed the images from the following studies.    I reviewed her MRI done on March 1 after surgery.  There is no evidence of residual or recurrent tumor.    Medical " Decision Making:      Told the patient we will see her again in June with a follow-up MRI done in the beginning of June.  She agrees.  She can return to more normal activities at this point.  I think she should go back to work at half days initially.    Diagnoses and all orders for this visit:    1. Dermoid cyst of brain (CMS/HCC) (Primary)  -     MRI Brain With & Without Contrast; Future      Return in about 3 months (around 6/18/2021).

## 2021-03-18 ENCOUNTER — OFFICE VISIT (OUTPATIENT)
Dept: NEUROSURGERY | Facility: CLINIC | Age: 41
End: 2021-03-18

## 2021-03-18 VITALS
HEART RATE: 78 BPM | DIASTOLIC BLOOD PRESSURE: 70 MMHG | SYSTOLIC BLOOD PRESSURE: 130 MMHG | BODY MASS INDEX: 29.39 KG/M2 | TEMPERATURE: 98 F | WEIGHT: 149.69 LBS | HEIGHT: 60 IN

## 2021-03-18 DIAGNOSIS — D33.2 DERMOID CYST OF BRAIN (HCC): Primary | ICD-10-CM

## 2021-03-18 PROCEDURE — 99024 POSTOP FOLLOW-UP VISIT: CPT | Performed by: NEUROLOGICAL SURGERY

## 2021-03-23 ENCOUNTER — OFFICE VISIT (OUTPATIENT)
Dept: NEUROLOGY | Facility: CLINIC | Age: 41
End: 2021-03-23

## 2021-03-23 VITALS
BODY MASS INDEX: 27.86 KG/M2 | OXYGEN SATURATION: 99 % | DIASTOLIC BLOOD PRESSURE: 78 MMHG | SYSTOLIC BLOOD PRESSURE: 128 MMHG | WEIGHT: 141.9 LBS | HEART RATE: 125 BPM | HEIGHT: 60 IN

## 2021-03-23 DIAGNOSIS — G43.119 INTRACTABLE MIGRAINE WITH AURA WITHOUT STATUS MIGRAINOSUS: Primary | ICD-10-CM

## 2021-03-23 DIAGNOSIS — D33.2 DERMOID CYST OF BRAIN (HCC): ICD-10-CM

## 2021-03-23 PROCEDURE — 99213 OFFICE O/P EST LOW 20 MIN: CPT | Performed by: NURSE PRACTITIONER

## 2021-03-23 RX ORDER — TOPIRAMATE 25 MG/1
50 TABLET ORAL DAILY
Qty: 60 TABLET | Refills: 2 | Status: SHIPPED | OUTPATIENT
Start: 2021-03-23 | End: 2021-04-13 | Stop reason: SDUPTHER

## 2021-03-23 NOTE — PROGRESS NOTES
Subjective:     Patient ID: Laura Cuenca is a 40 y.o. female presenting for follow-up for migraine headaches.  I saw the patient initially on January 26, 2021.  I started her on topiramate and ordered an MRI of her brain.  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  She had surgery to remove this on February 26, 2021.  She states today that she has been doing very well since that surgery.  She did not continue to take the topiramate due to the surgery and she had a lot going on, but she did restart the medication about 3 days ago.  She is currently taking 25 mg nightly.  She denies any changes thus far.  She denies any side effects that she has noticed.    The patient does report that when she woke up this morning she noticed some mild swelling around the right eye.  She denies any pain or redness.  Denies any vision changes.    She quit smoking after the surgery.    History of Present Illness  The following portions of the patient's history were reviewed and updated as appropriate: allergies, current medications, past family history, past medical history, past social history, past surgical history and problem list.    Review of Systems   Constitutional: Positive for fatigue. Negative for chills and fever.   HENT: Negative for hearing loss, tinnitus and trouble swallowing.    Eyes: Positive for photophobia and visual disturbance. Negative for pain.   Respiratory: Negative for cough, shortness of breath and wheezing.    Cardiovascular: Negative for palpitations and leg swelling.   Gastrointestinal: Negative for diarrhea, nausea and vomiting.   Endocrine: Negative for cold intolerance, heat intolerance and polydipsia.   Genitourinary: Negative for decreased urine volume, difficulty urinating and urgency.   Musculoskeletal: Negative for back pain, neck pain and neck stiffness.   Skin: Negative for color change, rash and wound.    Allergic/Immunologic: Negative for environmental allergies and food allergies.   Neurological: Positive for headaches. Negative for dizziness, tremors, seizures, syncope, facial asymmetry, speech difficulty, weakness, light-headedness and numbness.   Hematological: Negative for adenopathy. Does not bruise/bleed easily.   Psychiatric/Behavioral: Negative for confusion, decreased concentration and sleep disturbance. The patient is not nervous/anxious.         Objective:    Neurologic Exam  General: Well nourished, well developed, and in no acute distress.  HEENT: Normocephalic/atraumatic. Mucous membranes moist. Sclerae anicteric. There is mild edema over the right eye and above the right eyebrow. No redness.   Heart: Regular rate and rhythm. No murmurs, rubs or gallops.  Lungs: Clear to auscultation bilaterally.  Skin: Healing incision from frontal craniotomy. No erythrema or signs of infection.   Extremities: No clubbing, cyanosis or significant edema.   Psychiatric: Pleasant, cooperative, and appropriate.   Neurologic Exam:  Mental Status:  Alert and oriented. Speech is fluent. Comprehension is intact.   Cranial Nerves II-XII: Pupils equal, round, reactive to light. Extraocular movements are full and conjugate in all directions. Pursuit movements do not provoke any apparent dizziness or discomfort.  No nystagmus noted.  Hearing is intact to voice. Facial strength and sensation are preserved and symmetric. Tongue and palate midline. Voice non-hoarse, non-dysarthric.   Motor: Normal bulk and tone of bilateral upper and lower extremities. Strength is 5/5 in all 4 extremities both proximally and distally. There are no abnormal or involuntary movements noted.  Sensation: Intact to light touch throughout. Romberg was negative with no significant sway.   Coordination: Fully intact. Finger-to-nose performed accurately bilaterally.  Reflexes: The deep tendon reflexes are 2+/4 in bilateral biceps, brachioradialis, triceps,  patella, and Achilles.  No pathologic reflexes were noted.  Gait: Normal. No ataxia or apraxia.         Physical Exam    Assessment/Plan:     Diagnoses and all orders for this visit:    1. Intractable migraine with aura without status migrainosus (Primary)    2. Dermoid cyst of brain (CMS/HCC)    Other orders  -     topiramate (Topamax) 25 MG tablet; Take 2 tablets by mouth Daily.  Dispense: 60 tablet; Refill: 2        Overall the patient is doing very well.  Her migraine headaches have remained unchanged, but she was unable to start the topiramate as directed at her last visit given the events that unfolded following the MRI.  She did restart the medication 3 days ago.  She is currently taking 25 mg nightly.  She is going to continue this for 1 week and then will increase to 2 tablets nightly.  She will call in a couple of weeks if she is having problems or if this does not seem to be helping.  We can increase the dose further or try a different medication depending on how she feels.  I did recommend that she contact Dr. Carver' office regarding the very mild edema around the right eye she agreed to do so.  She did stop smoking after the surgery.  I encouraged her to get plenty of rest and to remain well-hydrated.  I stressed the importance of compliance with the medication.  She is to call with any problems, otherwise she will follow-up in the office in 8 to 10 weeks.

## 2021-04-13 RX ORDER — TOPIRAMATE 25 MG/1
50 TABLET ORAL 2 TIMES DAILY
Qty: 120 TABLET | Refills: 2 | Status: SHIPPED | OUTPATIENT
Start: 2021-04-13 | End: 2021-05-25 | Stop reason: SDUPTHER

## 2021-04-16 ENCOUNTER — BULK ORDERING (OUTPATIENT)
Dept: CASE MANAGEMENT | Facility: OTHER | Age: 41
End: 2021-04-16

## 2021-04-16 DIAGNOSIS — Z23 IMMUNIZATION DUE: ICD-10-CM

## 2021-05-25 ENCOUNTER — OFFICE VISIT (OUTPATIENT)
Dept: NEUROLOGY | Facility: CLINIC | Age: 41
End: 2021-05-25

## 2021-05-25 VITALS
SYSTOLIC BLOOD PRESSURE: 132 MMHG | DIASTOLIC BLOOD PRESSURE: 78 MMHG | OXYGEN SATURATION: 79 % | WEIGHT: 141 LBS | BODY MASS INDEX: 27.68 KG/M2 | HEART RATE: 113 BPM | HEIGHT: 60 IN

## 2021-05-25 DIAGNOSIS — D33.2 DERMOID CYST OF BRAIN (HCC): Primary | ICD-10-CM

## 2021-05-25 DIAGNOSIS — G43.019 INTRACTABLE MIGRAINE WITHOUT AURA AND WITHOUT STATUS MIGRAINOSUS: ICD-10-CM

## 2021-05-25 PROCEDURE — 99213 OFFICE O/P EST LOW 20 MIN: CPT | Performed by: NURSE PRACTITIONER

## 2021-05-25 RX ORDER — TOPIRAMATE 50 MG/1
50 TABLET, FILM COATED ORAL 2 TIMES DAILY
Qty: 60 TABLET | Refills: 2 | Status: SHIPPED | OUTPATIENT
Start: 2021-05-25 | End: 2021-08-26 | Stop reason: SDUPTHER

## 2021-05-25 RX ORDER — IBUPROFEN 800 MG/1
TABLET ORAL
COMMUNITY
Start: 2021-04-24

## 2021-05-25 NOTE — PROGRESS NOTES
Subjective:     Patient ID: Laura Cuenca is a 40 y.o. female presenting for follow-up for migraine headaches.  My last visit with the patient was on March 23, 2021.  She started topiramate and is now taking 75 mg nightly.  She did feel that this helped for several weeks but over the last week and 1/2 to 2 weeks weeks her headaches have worsened.  Questions if she needs a higher dose.  She is tolerating the medication well.  Denies any new problems since her last visit.    I ordered an MRI of the patient's brain after her initial visit in January 2021.  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 I referred her to Dr. Carver. She had surgery on February 26, 2021.     History of Present Illness  The following portions of the patient's history were reviewed and updated as appropriate: allergies, current medications, past family history, past medical history, past social history, past surgical history and problem list.    Review of Systems   Eyes: Positive for photophobia and visual disturbance. Negative for pain.   Respiratory: Negative for cough, shortness of breath and wheezing.    Cardiovascular: Negative for chest pain, palpitations and leg swelling.   Gastrointestinal: Positive for nausea. Negative for diarrhea and vomiting.   Endocrine: Negative for cold intolerance, heat intolerance and polydipsia.   Genitourinary: Negative for decreased urine volume, difficulty urinating and urgency.   Musculoskeletal: Negative for back pain, neck pain and neck stiffness.   Skin: Negative for color change, rash and wound.   Allergic/Immunologic: Negative for environmental allergies, food allergies and immunocompromised state.   Neurological: Positive for headaches. Negative for dizziness, tremors, seizures, syncope, facial asymmetry, speech difficulty, weakness, light-headedness and numbness.   Hematological: Negative for adenopathy. Does not bruise/bleed  easily.   Psychiatric/Behavioral: Negative for confusion and sleep disturbance. The patient is not nervous/anxious.         Objective:    Neurologic Exam  General: Well nourished, well developed, and in no acute distress.  HEENT: Normocephalic/atraumatic. Mucous membranes moist. Sclerae anicteric. There is mild edema over the right eye and above the right eyebrow. No redness.   Heart: Regular rate and rhythm. No murmurs, rubs or gallops.  Lungs: Clear to auscultation bilaterally.  Skin: Healing incision from frontal craniotomy. No erythrema or signs of infection.   Extremities: No clubbing, cyanosis or significant edema.   Psychiatric: Pleasant, cooperative, and appropriate.   Neurologic Exam:  Mental Status:  Alert and oriented. Speech is fluent. Comprehension is intact.   Cranial Nerves II-XII: Pupils equal, round, reactive to light. Extraocular movements are full and conjugate in all directions. Pursuit movements do not provoke any apparent dizziness or discomfort.  No nystagmus noted.  Hearing is intact to voice. Facial strength and sensation are preserved and symmetric. Tongue and palate midline. Voice non-hoarse, non-dysarthric.   Motor: Normal bulk and tone of bilateral upper and lower extremities. Strength is 5/5 in all 4 extremities both proximally and distally. There are no abnormal or involuntary movements noted.  Sensation: Intact to light touch throughout. Romberg was negative with no significant sway.   Coordination: Fully intact. Finger-to-nose performed accurately bilaterally.  Reflexes: The deep tendon reflexes are 2+/4 in bilateral biceps, brachioradialis, triceps, patella, and Achilles.  No pathologic reflexes were noted.  Gait: Normal. No ataxia or apraxia.         Physical Exam    Assessment/Plan:     Diagnoses and all orders for this visit:    1. Dermoid cyst of brain (CMS/HCC) (Primary)    2. Intractable migraine without aura and without status migrainosus    Other orders  -     topiramate  (Topamax) 50 MG tablet; Take 1 tablet by mouth 2 (Two) Times a Day.  Dispense: 60 tablet; Refill: 2        Overall she is doing much better but would like to increase the dose of topiramate if possible.  I am going to change the dosing increase to 50 mg twice a day.  Side effects were reviewed.  If this does not help after 4 weeks or so she will call and we can try a different medication.  I did let her know that I would likely leave her on the topiramate while trying for medication so as not to worsen her headaches in the process.  She is to call with any problems.  She is to follow-up in 3 months however if her headaches improved significantly with 50 mg twice a day of topiramate she can cancel this follow-up and change her to a 6 month follow-up.

## 2021-06-01 ENCOUNTER — HOSPITAL ENCOUNTER (OUTPATIENT)
Dept: MRI IMAGING | Facility: HOSPITAL | Age: 41
Discharge: HOME | End: 2021-06-01
Admitting: NEUROLOGICAL SURGERY

## 2021-06-01 DIAGNOSIS — D33.2 DERMOID CYST OF BRAIN (HCC): ICD-10-CM

## 2021-06-01 PROCEDURE — A9577 INJ MULTIHANCE: HCPCS | Performed by: NEUROLOGICAL SURGERY

## 2021-06-01 PROCEDURE — 70553 MRI BRAIN STEM W/O & W/DYE: CPT

## 2021-06-01 PROCEDURE — 0 GADOBENATE DIMEGLUMINE 529 MG/ML SOLUTION: Performed by: NEUROLOGICAL SURGERY

## 2021-06-01 RX ADMIN — GADOBENATE DIMEGLUMINE 13 ML: 529 INJECTION, SOLUTION INTRAVENOUS at 12:22

## 2021-06-09 NOTE — PROGRESS NOTES
"Subjective   Patient ID: Laura Cuenca is a 40 y.o. female is here today for follow-up with a new MRI Brain that was ordered on 03.18.2021.    Today patient has issues with being fatigue. Patient states that her vision is getting better, but she is having severe HA's. Patient states that she has bumps above her L eye.     Patient, Provider, and MA are all wearing  Mask in our office today.     History of Present Illness     This patient returns today.  She is doing quite well overall.  She has some pain in the scalp around her surgery area.  She has some alterations in her smell and her eyesight has improved.  Her incision looks great.    The following portions of the patient's history were reviewed and updated as appropriate: allergies, current medications, past family history, past medical history, past social history, past surgical history and problem list.    Review of Systems   Constitutional: Negative for chills and fever.   HENT: Negative for congestion.    Eyes: Negative for pain, discharge and itching.   Musculoskeletal: Negative for neck pain and neck stiffness.   Neurological: Positive for dizziness and headaches. Negative for light-headedness.       I have reviewed the review of systems as documented by my MA.      Objective     Vitals:    06/10/21 1323   BP: 110/72   Cuff Size: Adult   Pulse: 82   Temp: 98 °F (36.7 °C)   Weight: 64 kg (141 lb)   Height: 152.4 cm (60\")     Body mass index is 27.54 kg/m².      Physical Exam  Neurological:      Mental Status: She is alert and oriented to person, place, and time.       Neurologic Exam     Mental Status   Oriented to person, place, and time.         Assessment/Plan   Independent Review of Radiographic Studies:      I personally reviewed the images from the following studies.    I reviewed her MRI of the brain done on 1 June.  This shows no evidence of recurrent or residual tumor.  The radiologist was concerned that there was some residual tumor but I really " do not appreciate this at all.    Medical Decision Making:      I told the patient we will go ahead and scan her again in about 6 months.  I will see her back after that.    Diagnoses and all orders for this visit:    1. Dermoid cyst of brain (CMS/HCC) (Primary)  -     MRI Brain With & Without Contrast; Future      Return in about 6 months (around 12/10/2021).

## 2021-06-10 ENCOUNTER — OFFICE VISIT (OUTPATIENT)
Dept: NEUROSURGERY | Facility: CLINIC | Age: 41
End: 2021-06-10

## 2021-06-10 VITALS
HEART RATE: 82 BPM | SYSTOLIC BLOOD PRESSURE: 110 MMHG | DIASTOLIC BLOOD PRESSURE: 72 MMHG | HEIGHT: 60 IN | BODY MASS INDEX: 27.68 KG/M2 | TEMPERATURE: 98 F | WEIGHT: 141 LBS

## 2021-06-10 DIAGNOSIS — D33.2 DERMOID CYST OF BRAIN (HCC): Primary | ICD-10-CM

## 2021-06-10 PROCEDURE — 99024 POSTOP FOLLOW-UP VISIT: CPT | Performed by: NEUROLOGICAL SURGERY

## 2021-07-22 RX ORDER — TOPIRAMATE 25 MG/1
TABLET ORAL
Qty: 120 TABLET | Refills: 1 | OUTPATIENT
Start: 2021-07-22

## 2021-08-26 ENCOUNTER — OFFICE VISIT (OUTPATIENT)
Dept: NEUROLOGY | Facility: CLINIC | Age: 41
End: 2021-08-26

## 2021-08-26 VITALS
DIASTOLIC BLOOD PRESSURE: 76 MMHG | OXYGEN SATURATION: 99 % | SYSTOLIC BLOOD PRESSURE: 128 MMHG | HEART RATE: 91 BPM | WEIGHT: 133 LBS | HEIGHT: 60 IN | BODY MASS INDEX: 26.11 KG/M2

## 2021-08-26 DIAGNOSIS — D33.2 DERMOID CYST OF BRAIN (HCC): Primary | ICD-10-CM

## 2021-08-26 DIAGNOSIS — G43.019 INTRACTABLE MIGRAINE WITHOUT AURA AND WITHOUT STATUS MIGRAINOSUS: ICD-10-CM

## 2021-08-26 PROCEDURE — 99213 OFFICE O/P EST LOW 20 MIN: CPT | Performed by: NURSE PRACTITIONER

## 2021-08-26 RX ORDER — TOPIRAMATE 50 MG/1
50 TABLET, FILM COATED ORAL 2 TIMES DAILY
Qty: 60 TABLET | Refills: 5 | Status: SHIPPED | OUTPATIENT
Start: 2021-08-26 | End: 2021-09-28 | Stop reason: SDUPTHER

## 2021-08-26 RX ORDER — CHLORHEXIDINE GLUCONATE 0.12 MG/ML
RINSE ORAL
COMMUNITY
Start: 2021-08-09 | End: 2022-05-09

## 2021-08-26 NOTE — PROGRESS NOTES
Subjective:     Patient ID: Laura Cuenca is a 41 y.o. female presenting for follow up. My last visit with the patient was on May 25, 2021. She has a history of migraine headaches and is on topiramate. I increased the dose at her last visit. She is now taking 50 mg twice a day. She is not sure if this is helping much as she says about one month ago her uncle, who she was very close with, passed away unexpectedly from a heart attack.     Overall she is tolerating the topiramate well. Denies any side effects.     I ordered an MRI of the patient's brain after her initial visit in January 2021.  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 I referred her to Dr. Carver. She had surgery on February 26, 2021.     History of Present Illness  The following portions of the patient's history were reviewed and updated as appropriate: allergies, current medications, past family history, past medical history, past social history, past surgical history and problem list.    Review of Systems   Constitutional: Negative for chills, fatigue and fever.   HENT: Negative for hearing loss, tinnitus and trouble swallowing.    Eyes: Positive for photophobia and visual disturbance. Negative for redness.   Respiratory: Negative for cough, shortness of breath and wheezing.    Cardiovascular: Negative for chest pain, palpitations and leg swelling.   Gastrointestinal: Positive for nausea. Negative for diarrhea and vomiting.   Endocrine: Negative for cold intolerance, heat intolerance and polydipsia.   Genitourinary: Negative for decreased urine volume, difficulty urinating and urgency.   Musculoskeletal: Positive for neck pain and neck stiffness. Negative for back pain.   Skin: Negative for color change, rash and wound.   Allergic/Immunologic: Negative for environmental allergies, food allergies and immunocompromised state.   Neurological: Positive for dizziness and headaches.  Negative for tremors, seizures, syncope, facial asymmetry, speech difficulty, weakness, light-headedness and numbness.   Hematological: Negative for adenopathy. Does not bruise/bleed easily.   Psychiatric/Behavioral: Negative for confusion and sleep disturbance. The patient is not nervous/anxious.         Objective:    Neurologic Exam  General: Well nourished, well developed, and in no acute distress.  HEENT: Normocephalic/atraumatic. Mucous membranes moist. Sclerae anicteric.   Heart: Regular rate and rhythm. No murmurs, rubs or gallops.  Lungs: Clear to auscultation bilaterally.  Skin: No notable rashes or lesions on the visible surfaces.   Extremities: No clubbing, cyanosis or significant edema.   Psychiatric: Pleasant, cooperative, and appropriate.   Neurologic Exam:  Mental Status:  Alert and oriented. Speech is fluent. Comprehension is intact.   Cranial Nerves II-XII: Pupils equal, round, reactive to light. Extraocular movements are full and conjugate in all directions. Pursuit movements do not provoke any apparent dizziness or discomfort.  No nystagmus noted.  Hearing is intact to voice. Facial strength and sensation are preserved and symmetric. Tongue and palate midline. Voice non-hoarse, non-dysarthric.   Motor: Normal bulk and tone of bilateral upper and lower extremities. Strength is 5/5 in all 4 extremities both proximally and distally. There are no abnormal or involuntary movements noted.  Sensation: Intact to light touch throughout. Romberg was negative with no significant sway.   Coordination: Fully intact. Finger-to-nose performed accurately bilaterally.  Reflexes: The deep tendon reflexes are 2+/4 in bilateral biceps, brachioradialis, triceps, patella, and Achilles.  No pathologic reflexes were noted.  Gait: Normal. No ataxia or apraxia.         Physical Exam    Assessment/Plan:     Diagnoses and all orders for this visit:    1. Dermoid cyst of brain (CMS/HCC) (Primary)    2. Intractable migraine  without aura and without status migrainosus    Other orders  -     topiramate (Topamax) 50 MG tablet; Take 1 tablet by mouth 2 (Two) Times a Day.  Dispense: 60 tablet; Refill: 5        The patient is not sure if her headaches have improved or not as she has been under quite a bit of stress over the last month related to the death of her uncle.  I recommended not making any changes today as due to the significant stress she would not be likely to see any significant changes to her migraines.  I suggested that she give a couple months to see how things change she starts to heal from this. She will call if no improvement. Will determine follow up based on that.

## 2021-09-28 RX ORDER — TOPIRAMATE 50 MG/1
100 TABLET, FILM COATED ORAL 2 TIMES DAILY
Qty: 120 TABLET | Refills: 5 | Status: SHIPPED | OUTPATIENT
Start: 2021-09-28 | End: 2022-05-10 | Stop reason: SDUPTHER

## 2021-09-28 RX ORDER — RIMEGEPANT SULFATE 75 MG/75MG
1 TABLET, ORALLY DISINTEGRATING ORAL DAILY PRN
Qty: 10 TABLET | Refills: 5 | Status: SHIPPED | OUTPATIENT
Start: 2021-09-28 | End: 2022-09-07

## 2021-10-01 ENCOUNTER — TELEPHONE (OUTPATIENT)
Dept: NEUROLOGY | Facility: CLINIC | Age: 41
End: 2021-10-01

## 2021-10-01 RX ORDER — RIZATRIPTAN BENZOATE 10 MG/1
TABLET ORAL
Qty: 12 TABLET | Refills: 2 | Status: SHIPPED | OUTPATIENT
Start: 2021-10-01 | End: 2022-06-23

## 2021-10-01 NOTE — TELEPHONE ENCOUNTER
Will you let her now I sent in rizatriptan for her to try because of this denial. If this doesn't help let us know and hopefully then they will approve Ubrelvy.

## 2021-12-06 ENCOUNTER — APPOINTMENT (OUTPATIENT)
Dept: MRI IMAGING | Facility: HOSPITAL | Age: 41
End: 2021-12-06

## 2022-01-04 ENCOUNTER — HOSPITAL ENCOUNTER (OUTPATIENT)
Dept: MRI IMAGING | Facility: HOSPITAL | Age: 42
Discharge: HOME | End: 2022-01-04
Admitting: NEUROLOGICAL SURGERY

## 2022-01-04 DIAGNOSIS — D33.2 DERMOID CYST OF BRAIN: ICD-10-CM

## 2022-01-04 PROCEDURE — 0 GADOBENATE DIMEGLUMINE 529 MG/ML SOLUTION: Performed by: NEUROLOGICAL SURGERY

## 2022-01-04 PROCEDURE — 70553 MRI BRAIN STEM W/O & W/DYE: CPT

## 2022-01-04 PROCEDURE — A9577 INJ MULTIHANCE: HCPCS | Performed by: NEUROLOGICAL SURGERY

## 2022-01-04 RX ADMIN — GADOBENATE DIMEGLUMINE 11 ML: 529 INJECTION, SOLUTION INTRAVENOUS at 07:10

## 2022-01-13 ENCOUNTER — OFFICE VISIT (OUTPATIENT)
Dept: NEUROSURGERY | Facility: CLINIC | Age: 42
End: 2022-01-13

## 2022-01-13 VITALS
DIASTOLIC BLOOD PRESSURE: 80 MMHG | SYSTOLIC BLOOD PRESSURE: 130 MMHG | WEIGHT: 133 LBS | HEART RATE: 92 BPM | HEIGHT: 60 IN | BODY MASS INDEX: 26.11 KG/M2 | TEMPERATURE: 98 F

## 2022-01-13 DIAGNOSIS — D33.2 DERMOID CYST OF BRAIN: Primary | ICD-10-CM

## 2022-01-13 PROCEDURE — 99213 OFFICE O/P EST LOW 20 MIN: CPT | Performed by: NEUROLOGICAL SURGERY

## 2022-01-13 NOTE — PROGRESS NOTES
"Subjective   Patient ID: Laura Cuenca is a 41 y.o. female is here today for 6 month follow-up with a new MRI Brain done on 01.04.2022 at [facility].    Today patient states that she has been having a constant HA, along with constant nausea, blurred vision dizziness, lightheadedness, confusion, mood changes.     Patient, Provider, and MA are all wearing a mask in our office today.     History of Present Illness     This patient returns today.  She continues with headaches.  She has no neurologic deficit.    The following portions of the patient's history were reviewed and updated as appropriate: allergies, current medications, past family history, past medical history, past social history, past surgical history and problem list.    Review of Systems   Constitutional: Negative for chills and fever.   HENT: Negative for congestion.    Eyes: Positive for visual disturbance.   Neurological: Positive for dizziness, light-headedness and headaches. Negative for speech difficulty.   Psychiatric/Behavioral: Positive for confusion.       I have reviewed the review of systems as documented by my MA.      Objective     Vitals:    01/13/22 1318   BP: 130/80   Cuff Size: Adult   Pulse: 92   Temp: 98 °F (36.7 °C)   Weight: 60.3 kg (133 lb)   Height: 152.4 cm (60\")     Body mass index is 25.97 kg/m².      Physical Exam  Neurological:      Mental Status: She is alert and oriented to person, place, and time.       Neurologic Exam     Mental Status   Oriented to person, place, and time.           Assessment/Plan   Independent Review of Radiographic Studies:      I personally reviewed the images from the following studies.    I reviewed an MRI of her brain done on 4 January.  This shows a little enhancement at the base of the resection cavity which the radiologist feels is recurrent dermoid.  It really kind of looks like scar tissue to me.  It is only a few millimeters in width.  There are several other areas of potential fatty deposits which " I think is consistent with CSF spread of the dermoid.    Medical Decision Making:      I explained all this to the patient.  I told her that from my point of view I do not see anything here I would recommend further surgery for.  I did tell her that we should scan her again in about 6 months.  We will see her back at that time.    Diagnoses and all orders for this visit:    1. Dermoid cyst of brain (HCC) (Primary)  -     MRI Brain With & Without Contrast; Future      Return in about 6 months (around 7/13/2022).

## 2022-01-14 DIAGNOSIS — D33.2 DERMOID CYST OF BRAIN: Primary | ICD-10-CM

## 2022-01-15 RX ORDER — DEXAMETHASONE 1.5 MG/1
TABLET ORAL
Qty: 51 TABLET | Refills: 0 | Status: SHIPPED | OUTPATIENT
Start: 2022-01-15 | End: 2022-05-11

## 2022-05-03 ENCOUNTER — TELEPHONE (OUTPATIENT)
Dept: NEUROSURGERY | Facility: CLINIC | Age: 42
End: 2022-05-03

## 2022-05-03 ENCOUNTER — OFFICE VISIT (OUTPATIENT)
Dept: NEUROLOGY | Facility: CLINIC | Age: 42
End: 2022-05-03

## 2022-05-03 VITALS
BODY MASS INDEX: 24.97 KG/M2 | WEIGHT: 127.87 LBS | DIASTOLIC BLOOD PRESSURE: 98 MMHG | OXYGEN SATURATION: 98 % | SYSTOLIC BLOOD PRESSURE: 140 MMHG | HEART RATE: 99 BPM

## 2022-05-03 DIAGNOSIS — G43.011 INTRACTABLE MIGRAINE WITHOUT AURA AND WITH STATUS MIGRAINOSUS: Primary | ICD-10-CM

## 2022-05-03 DIAGNOSIS — D33.2 DERMOID CYST OF BRAIN: ICD-10-CM

## 2022-05-03 PROBLEM — G43.719 INTRACTABLE CHRONIC MIGRAINE WITHOUT AURA: Status: ACTIVE | Noted: 2022-05-03

## 2022-05-03 PROCEDURE — 99213 OFFICE O/P EST LOW 20 MIN: CPT | Performed by: NURSE PRACTITIONER

## 2022-05-03 RX ORDER — ASPIRIN 325 MG
325 TABLET ORAL DAILY
COMMUNITY
End: 2022-05-11

## 2022-05-03 RX ORDER — COVID-19 ANTIGEN TEST
KIT MISCELLANEOUS
COMMUNITY
End: 2022-05-11

## 2022-05-03 RX ORDER — DEXAMETHASONE SODIUM PHOSPHATE 4 MG/ML
4 INJECTION, SOLUTION INTRA-ARTICULAR; INTRALESIONAL; INTRAMUSCULAR; INTRAVENOUS; SOFT TISSUE ONCE
Status: CANCELLED
Start: 2022-05-03 | End: 2022-05-03

## 2022-05-03 RX ORDER — GALCANEZUMAB 120 MG/ML
120 INJECTION, SOLUTION SUBCUTANEOUS
Qty: 1 ML | Refills: 2 | Status: SHIPPED | OUTPATIENT
Start: 2022-05-03 | End: 2022-08-02 | Stop reason: SDUPTHER

## 2022-05-03 NOTE — PROGRESS NOTES
Subjective:     Patient ID: Laura Cuenca is a 41 y.o. female presenting for follow up. My last visit with the patient was on 8/26/21.  She has a history of migraine headaches.  I ordered an MRI of her brain after her initial visit in January 2021.  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  I referred her to Dr. Carver.  She had surgery on February 26, 2021.  She had a recent MRI and it appears the cyst has returned.  She states she was told that she will likely need another surgery.  She is scheduled for repeat MRI in 6 months.  She is taking topiramate 100 mg twice daily and amitriptyline 50 mg nightly.  She is continuing to have daily headaches.  She is taking Excedrin Migraine, ibuprofen, and Tylenol several times per day.  She has a prescription for Nurtec as well but she says she is using all 8/month fairly quickly and then running out.    History of Present Illness  The following portions of the patient's history were reviewed and updated as appropriate: allergies, current medications, past family history, past medical history, past social history, past surgical history and problem list.    Review of Systems   Eyes: Positive for visual disturbance (vision loss with migraines).   Gastrointestinal: Positive for nausea (with migraines) and vomiting (with migraines).   Musculoskeletal: Positive for gait problem (> 2 falls in the last year - no injuries).   Neurological: Positive for dizziness (due to return of tumors), speech difficulty, light-headedness (due to return of tumors) and headaches. Negative for tremors, seizures, syncope, facial asymmetry, weakness and numbness.   Hematological: Does not bruise/bleed easily.   Psychiatric/Behavioral: Positive for agitation (mood swings), hallucinations (auditory & visual) and sleep disturbance (staying asleep ). Negative for behavioral problems, confusion, decreased concentration, dysphoric mood,  self-injury and suicidal ideas. The patient is nervous/anxious. The patient is not hyperactive.       I have reviewed and confirmed the accuracy of the patient's history: Chief complaint, HPI, ROS, Subjective and Past Family Social History as entered by the MA. Miguel Seoer, APRN 05/12/22       Objective:    Neurologic Exam  General: Well nourished, well developed, and in no acute distress.  HEENT: Normocephalic/atraumatic. Mucous membranes moist. Sclerae anicteric.   Heart: Regular rate and rhythm. No murmurs, rubs or gallops.  Lungs: Clear to auscultation bilaterally.  Skin: No notable rashes or lesions on the visible surfaces.   Extremities: No clubbing, cyanosis or significant edema.   Psychiatric: Pleasant, cooperative, and appropriate.   Neurologic Exam:  Mental Status:  Alert and oriented. Speech is fluent. Comprehension is intact.   Cranial Nerves II-XII: Pupils equal, round, reactive to light. Extraocular movements are full and conjugate in all directions. Pursuit movements do not provoke any apparent dizziness or discomfort.  No nystagmus noted.  Hearing is intact to voice. Facial strength and sensation are preserved and symmetric. Tongue and palate midline. Voice non-hoarse, non-dysarthric.   Motor: Normal bulk and tone of bilateral upper and lower extremities. Strength is 5/5 in all 4 extremities both proximally and distally. There are no abnormal or involuntary movements noted.  Sensation: Intact to light touch throughout. Romberg was negative with no significant sway.   Coordination: Fully intact. Finger-to-nose performed accurately bilaterally.  Reflexes: The deep tendon reflexes are 2+/4 in bilateral biceps, brachioradialis, triceps, patella, and Achilles.  No pathologic reflexes were noted.  Gait: Normal. No ataxia or apraxia.         Physical Exam    Assessment/Plan:     Diagnoses and all orders for this visit:    1. Intractable migraine without aura and with status migrainosus  (Primary)    2. Dermoid cyst of brain (HCC)    Other orders  -     galcanezumab-gnlm (Emgality) 120 MG/ML auto-injector pen; Inject 1 mL under the skin into the appropriate area as directed Every 30 (Thirty) Days.  Dispense: 1 mL; Refill: 2        I am going to refer her to the infusion center for a 5 day infusion of Depakote, magnesium, and dexamethasone. I have strongly encouraged her to discontinue over the counter medications. She likely is experiencing a medication overuse headache given how much she is using these medications. I recommended continuing Nurtec if needed and hopefully the infusion will help improve the headache as well. Will start her on Emgality today. Side effects reviewed. She will continue topiramate and amitriptyline for now. She may be able to discontinue one or both of these if the Emgality is helpful. We will discuss this in follow up in 12 weeks. She will call in the meantime with any problems.

## 2022-05-09 ENCOUNTER — HOSPITAL ENCOUNTER (OUTPATIENT)
Dept: INFUSION THERAPY | Facility: HOSPITAL | Age: 42
Discharge: HOME | End: 2022-05-09
Admitting: NURSE PRACTITIONER

## 2022-05-09 VITALS
DIASTOLIC BLOOD PRESSURE: 91 MMHG | OXYGEN SATURATION: 100 % | SYSTOLIC BLOOD PRESSURE: 126 MMHG | RESPIRATION RATE: 18 BRPM | HEART RATE: 93 BPM | TEMPERATURE: 97.7 F

## 2022-05-09 DIAGNOSIS — G43.711 INTRACTABLE CHRONIC MIGRAINE WITHOUT AURA AND WITH STATUS MIGRAINOSUS: Primary | ICD-10-CM

## 2022-05-09 PROCEDURE — 96374 THER/PROPH/DIAG INJ IV PUSH: CPT

## 2022-05-09 PROCEDURE — 96375 TX/PRO/DX INJ NEW DRUG ADDON: CPT

## 2022-05-09 PROCEDURE — 96367 TX/PROPH/DG ADDL SEQ IV INF: CPT

## 2022-05-09 PROCEDURE — 96366 THER/PROPH/DIAG IV INF ADDON: CPT

## 2022-05-09 PROCEDURE — 96365 THER/PROPH/DIAG IV INF INIT: CPT

## 2022-05-09 PROCEDURE — 25010000002 DEXAMETHASONE PER 1 MG: Performed by: NURSE PRACTITIONER

## 2022-05-09 PROCEDURE — 25010000002 MAGNESIUM SULFATE IN D5W 1G/100ML (PREMIX) 1-5 GM/100ML-% SOLUTION: Performed by: NURSE PRACTITIONER

## 2022-05-09 RX ORDER — MAGNESIUM SULFATE 1 G/100ML
1 INJECTION INTRAVENOUS ONCE
Status: CANCELLED
Start: 2022-05-10 | End: 2022-05-10

## 2022-05-09 RX ORDER — DEXAMETHASONE SODIUM PHOSPHATE 4 MG/ML
4 INJECTION, SOLUTION INTRA-ARTICULAR; INTRALESIONAL; INTRAMUSCULAR; INTRAVENOUS; SOFT TISSUE ONCE
Status: CANCELLED
Start: 2022-05-10 | End: 2022-05-10

## 2022-05-09 RX ORDER — DEXAMETHASONE SODIUM PHOSPHATE 4 MG/ML
4 INJECTION, SOLUTION INTRA-ARTICULAR; INTRALESIONAL; INTRAMUSCULAR; INTRAVENOUS; SOFT TISSUE ONCE
Status: COMPLETED | OUTPATIENT
Start: 2022-05-09 | End: 2022-05-09

## 2022-05-09 RX ORDER — MAGNESIUM SULFATE 1 G/100ML
1 INJECTION INTRAVENOUS ONCE
Status: COMPLETED | OUTPATIENT
Start: 2022-05-09 | End: 2022-05-09

## 2022-05-09 RX ADMIN — SODIUM CHLORIDE 1000 MG: 9 INJECTION, SOLUTION INTRAVENOUS at 10:53

## 2022-05-09 RX ADMIN — MAGNESIUM SULFATE 1 G: 1 INJECTION INTRAVENOUS at 09:48

## 2022-05-09 RX ADMIN — DEXAMETHASONE SODIUM PHOSPHATE 4 MG: 4 INJECTION, SOLUTION INTRAMUSCULAR; INTRAVENOUS at 09:44

## 2022-05-10 ENCOUNTER — HOSPITAL ENCOUNTER (OUTPATIENT)
Dept: INFUSION THERAPY | Facility: HOSPITAL | Age: 42
Setting detail: INFUSION SERIES
Discharge: HOME | End: 2022-05-10

## 2022-05-10 VITALS
SYSTOLIC BLOOD PRESSURE: 122 MMHG | RESPIRATION RATE: 18 BRPM | DIASTOLIC BLOOD PRESSURE: 93 MMHG | TEMPERATURE: 97.5 F | OXYGEN SATURATION: 99 % | HEART RATE: 102 BPM

## 2022-05-10 DIAGNOSIS — G43.711 INTRACTABLE CHRONIC MIGRAINE WITHOUT AURA AND WITH STATUS MIGRAINOSUS: Primary | ICD-10-CM

## 2022-05-10 PROCEDURE — 96365 THER/PROPH/DIAG IV INF INIT: CPT

## 2022-05-10 PROCEDURE — 25010000002 MAGNESIUM SULFATE IN D5W 1G/100ML (PREMIX) 1-5 GM/100ML-% SOLUTION: Performed by: NURSE PRACTITIONER

## 2022-05-10 PROCEDURE — 96375 TX/PRO/DX INJ NEW DRUG ADDON: CPT

## 2022-05-10 PROCEDURE — 96367 TX/PROPH/DG ADDL SEQ IV INF: CPT

## 2022-05-10 PROCEDURE — 25010000002 DEXAMETHASONE PER 1 MG: Performed by: NURSE PRACTITIONER

## 2022-05-10 PROCEDURE — 96374 THER/PROPH/DIAG INJ IV PUSH: CPT

## 2022-05-10 RX ORDER — TOPIRAMATE 50 MG/1
100 TABLET, FILM COATED ORAL 2 TIMES DAILY
Qty: 120 TABLET | Refills: 5 | Status: SHIPPED | OUTPATIENT
Start: 2022-05-10 | End: 2022-08-02 | Stop reason: SDUPTHER

## 2022-05-10 RX ORDER — MAGNESIUM SULFATE 1 G/100ML
1 INJECTION INTRAVENOUS ONCE
Status: COMPLETED | OUTPATIENT
Start: 2022-05-10 | End: 2022-05-10

## 2022-05-10 RX ORDER — DEXAMETHASONE SODIUM PHOSPHATE 4 MG/ML
4 INJECTION, SOLUTION INTRA-ARTICULAR; INTRALESIONAL; INTRAMUSCULAR; INTRAVENOUS; SOFT TISSUE ONCE
Status: COMPLETED | OUTPATIENT
Start: 2022-05-10 | End: 2022-05-10

## 2022-05-10 RX ORDER — DEXAMETHASONE SODIUM PHOSPHATE 4 MG/ML
4 INJECTION, SOLUTION INTRA-ARTICULAR; INTRALESIONAL; INTRAMUSCULAR; INTRAVENOUS; SOFT TISSUE ONCE
Status: CANCELLED
Start: 2022-05-11 | End: 2022-05-11

## 2022-05-10 RX ORDER — MAGNESIUM SULFATE 1 G/100ML
1 INJECTION INTRAVENOUS ONCE
Status: CANCELLED
Start: 2022-05-11 | End: 2022-05-11

## 2022-05-10 RX ADMIN — MAGNESIUM SULFATE HEPTAHYDRATE 1 G: 1 INJECTION, SOLUTION INTRAVENOUS at 09:03

## 2022-05-10 RX ADMIN — DEXAMETHASONE SODIUM PHOSPHATE 4 MG: 4 INJECTION, SOLUTION INTRAMUSCULAR; INTRAVENOUS at 08:57

## 2022-05-10 RX ADMIN — SODIUM CHLORIDE 1000 MG: 9 INJECTION, SOLUTION INTRAVENOUS at 10:07

## 2022-05-11 ENCOUNTER — HOSPITAL ENCOUNTER (OUTPATIENT)
Dept: INFUSION THERAPY | Facility: HOSPITAL | Age: 42
Setting detail: INFUSION SERIES
Discharge: HOME | End: 2022-05-11

## 2022-05-11 VITALS
SYSTOLIC BLOOD PRESSURE: 132 MMHG | TEMPERATURE: 97.3 F | HEART RATE: 91 BPM | DIASTOLIC BLOOD PRESSURE: 93 MMHG | RESPIRATION RATE: 18 BRPM | OXYGEN SATURATION: 100 %

## 2022-05-11 DIAGNOSIS — G43.711 INTRACTABLE CHRONIC MIGRAINE WITHOUT AURA AND WITH STATUS MIGRAINOSUS: Primary | ICD-10-CM

## 2022-05-11 PROCEDURE — 25010000002 DEXAMETHASONE PER 1 MG: Performed by: NURSE PRACTITIONER

## 2022-05-11 PROCEDURE — 25010000002 MAGNESIUM SULFATE IN D5W 1G/100ML (PREMIX) 1-5 GM/100ML-% SOLUTION: Performed by: NURSE PRACTITIONER

## 2022-05-11 PROCEDURE — 96365 THER/PROPH/DIAG IV INF INIT: CPT

## 2022-05-11 PROCEDURE — 96367 TX/PROPH/DG ADDL SEQ IV INF: CPT

## 2022-05-11 PROCEDURE — 96375 TX/PRO/DX INJ NEW DRUG ADDON: CPT

## 2022-05-11 RX ORDER — DEXAMETHASONE SODIUM PHOSPHATE 4 MG/ML
4 INJECTION, SOLUTION INTRA-ARTICULAR; INTRALESIONAL; INTRAMUSCULAR; INTRAVENOUS; SOFT TISSUE ONCE
Status: CANCELLED
Start: 2022-05-12 | End: 2022-05-12

## 2022-05-11 RX ORDER — DEXAMETHASONE SODIUM PHOSPHATE 4 MG/ML
4 INJECTION, SOLUTION INTRA-ARTICULAR; INTRALESIONAL; INTRAMUSCULAR; INTRAVENOUS; SOFT TISSUE ONCE
Status: COMPLETED | OUTPATIENT
Start: 2022-05-11 | End: 2022-05-11

## 2022-05-11 RX ORDER — MAGNESIUM SULFATE 1 G/100ML
1 INJECTION INTRAVENOUS ONCE
Status: CANCELLED
Start: 2022-05-12 | End: 2022-05-12

## 2022-05-11 RX ORDER — MAGNESIUM SULFATE 1 G/100ML
1 INJECTION INTRAVENOUS ONCE
Status: COMPLETED | OUTPATIENT
Start: 2022-05-11 | End: 2022-05-11

## 2022-05-11 RX ADMIN — MAGNESIUM SULFATE 1 G: 1 INJECTION INTRAVENOUS at 10:13

## 2022-05-11 RX ADMIN — VALPROATE SODIUM 1000 MG: 100 INJECTION, SOLUTION INTRAVENOUS at 11:18

## 2022-05-11 RX ADMIN — DEXAMETHASONE SODIUM PHOSPHATE 4 MG: 4 INJECTION, SOLUTION INTRAMUSCULAR; INTRAVENOUS at 10:08

## 2022-05-11 NOTE — PROGRESS NOTES
Tolerated infusion well. Ate peanut butter crackers and warm blanket given. No C/O migraine headache today. D/C'd per ambulation.

## 2022-05-12 ENCOUNTER — HOSPITAL ENCOUNTER (OUTPATIENT)
Dept: INFUSION THERAPY | Facility: HOSPITAL | Age: 42
Discharge: HOME | End: 2022-05-12
Admitting: NURSE PRACTITIONER

## 2022-05-12 VITALS
OXYGEN SATURATION: 100 % | SYSTOLIC BLOOD PRESSURE: 131 MMHG | RESPIRATION RATE: 20 BRPM | DIASTOLIC BLOOD PRESSURE: 95 MMHG | HEART RATE: 102 BPM | TEMPERATURE: 98.4 F

## 2022-05-12 DIAGNOSIS — G43.711 INTRACTABLE CHRONIC MIGRAINE WITHOUT AURA AND WITH STATUS MIGRAINOSUS: Primary | ICD-10-CM

## 2022-05-12 PROCEDURE — 96365 THER/PROPH/DIAG IV INF INIT: CPT

## 2022-05-12 PROCEDURE — 96367 TX/PROPH/DG ADDL SEQ IV INF: CPT

## 2022-05-12 PROCEDURE — 96375 TX/PRO/DX INJ NEW DRUG ADDON: CPT

## 2022-05-12 PROCEDURE — 25010000002 DEXAMETHASONE PER 1 MG: Performed by: NURSE PRACTITIONER

## 2022-05-12 PROCEDURE — 96374 THER/PROPH/DIAG INJ IV PUSH: CPT

## 2022-05-12 PROCEDURE — 25010000002 MAGNESIUM SULFATE IN D5W 1G/100ML (PREMIX) 1-5 GM/100ML-% SOLUTION: Performed by: NURSE PRACTITIONER

## 2022-05-12 RX ORDER — MAGNESIUM SULFATE 1 G/100ML
1 INJECTION INTRAVENOUS ONCE
Status: CANCELLED
Start: 2022-05-13 | End: 2022-05-13

## 2022-05-12 RX ORDER — DEXAMETHASONE SODIUM PHOSPHATE 4 MG/ML
4 INJECTION, SOLUTION INTRA-ARTICULAR; INTRALESIONAL; INTRAMUSCULAR; INTRAVENOUS; SOFT TISSUE ONCE
Status: COMPLETED | OUTPATIENT
Start: 2022-05-12 | End: 2022-05-12

## 2022-05-12 RX ORDER — MAGNESIUM SULFATE 1 G/100ML
1 INJECTION INTRAVENOUS ONCE
Status: COMPLETED | OUTPATIENT
Start: 2022-05-12 | End: 2022-05-12

## 2022-05-12 RX ORDER — DEXAMETHASONE SODIUM PHOSPHATE 4 MG/ML
4 INJECTION, SOLUTION INTRA-ARTICULAR; INTRALESIONAL; INTRAMUSCULAR; INTRAVENOUS; SOFT TISSUE ONCE
Status: CANCELLED
Start: 2022-05-13 | End: 2022-05-13

## 2022-05-12 RX ADMIN — MAGNESIUM SULFATE 1 G: 1 INJECTION INTRAVENOUS at 11:49

## 2022-05-12 RX ADMIN — VALPROATE SODIUM 1000 MG: 100 INJECTION, SOLUTION INTRAVENOUS at 12:52

## 2022-05-12 RX ADMIN — DEXAMETHASONE SODIUM PHOSPHATE 4 MG: 4 INJECTION, SOLUTION INTRAMUSCULAR; INTRAVENOUS at 11:46

## 2022-05-13 ENCOUNTER — HOSPITAL ENCOUNTER (OUTPATIENT)
Dept: INFUSION THERAPY | Facility: HOSPITAL | Age: 42
Discharge: HOME | End: 2022-05-13
Admitting: NURSE PRACTITIONER

## 2022-05-13 VITALS
HEART RATE: 90 BPM | TEMPERATURE: 97.3 F | DIASTOLIC BLOOD PRESSURE: 89 MMHG | OXYGEN SATURATION: 100 % | SYSTOLIC BLOOD PRESSURE: 127 MMHG | RESPIRATION RATE: 20 BRPM

## 2022-05-13 DIAGNOSIS — G43.711 INTRACTABLE CHRONIC MIGRAINE WITHOUT AURA AND WITH STATUS MIGRAINOSUS: Primary | ICD-10-CM

## 2022-05-13 PROCEDURE — 25010000002 DEXAMETHASONE PER 1 MG: Performed by: NURSE PRACTITIONER

## 2022-05-13 PROCEDURE — 96375 TX/PRO/DX INJ NEW DRUG ADDON: CPT

## 2022-05-13 PROCEDURE — 96365 THER/PROPH/DIAG IV INF INIT: CPT

## 2022-05-13 PROCEDURE — 25010000002 MAGNESIUM SULFATE IN D5W 1G/100ML (PREMIX) 1-5 GM/100ML-% SOLUTION: Performed by: NURSE PRACTITIONER

## 2022-05-13 PROCEDURE — 96374 THER/PROPH/DIAG INJ IV PUSH: CPT

## 2022-05-13 PROCEDURE — 96367 TX/PROPH/DG ADDL SEQ IV INF: CPT

## 2022-05-13 RX ORDER — DEXAMETHASONE SODIUM PHOSPHATE 4 MG/ML
4 INJECTION, SOLUTION INTRA-ARTICULAR; INTRALESIONAL; INTRAMUSCULAR; INTRAVENOUS; SOFT TISSUE ONCE
Status: CANCELLED
Start: 2022-05-13 | End: 2022-05-13

## 2022-05-13 RX ORDER — DEXAMETHASONE SODIUM PHOSPHATE 4 MG/ML
4 INJECTION, SOLUTION INTRA-ARTICULAR; INTRALESIONAL; INTRAMUSCULAR; INTRAVENOUS; SOFT TISSUE ONCE
Status: COMPLETED | OUTPATIENT
Start: 2022-05-13 | End: 2022-05-13

## 2022-05-13 RX ORDER — MAGNESIUM SULFATE 1 G/100ML
1 INJECTION INTRAVENOUS ONCE
Status: CANCELLED
Start: 2022-05-13 | End: 2022-05-13

## 2022-05-13 RX ORDER — MAGNESIUM SULFATE 1 G/100ML
1 INJECTION INTRAVENOUS ONCE
Status: COMPLETED | OUTPATIENT
Start: 2022-05-13 | End: 2022-05-13

## 2022-05-13 RX ADMIN — VALPROATE SODIUM 1000 MG: 100 INJECTION, SOLUTION INTRAVENOUS at 11:24

## 2022-05-13 RX ADMIN — MAGNESIUM SULFATE 1 G: 1 INJECTION INTRAVENOUS at 12:35

## 2022-05-13 RX ADMIN — DEXAMETHASONE SODIUM PHOSPHATE 4 MG: 4 INJECTION, SOLUTION INTRAMUSCULAR; INTRAVENOUS at 11:17

## 2022-06-23 RX ORDER — RIZATRIPTAN BENZOATE 10 MG/1
TABLET ORAL
Qty: 12 TABLET | Refills: 2 | Status: SHIPPED | OUTPATIENT
Start: 2022-06-23 | End: 2022-10-03

## 2022-07-07 ENCOUNTER — HOSPITAL ENCOUNTER (OUTPATIENT)
Dept: MRI IMAGING | Facility: HOSPITAL | Age: 42
Discharge: HOME | End: 2022-07-07
Admitting: NEUROLOGICAL SURGERY

## 2022-07-07 DIAGNOSIS — D33.2 DERMOID CYST OF BRAIN: ICD-10-CM

## 2022-07-07 PROCEDURE — 0 GADOBENATE DIMEGLUMINE 529 MG/ML SOLUTION: Performed by: NEUROLOGICAL SURGERY

## 2022-07-07 PROCEDURE — 70553 MRI BRAIN STEM W/O & W/DYE: CPT

## 2022-07-07 PROCEDURE — A9577 INJ MULTIHANCE: HCPCS | Performed by: NEUROLOGICAL SURGERY

## 2022-07-07 RX ADMIN — GADOBENATE DIMEGLUMINE 10 ML: 529 INJECTION, SOLUTION INTRAVENOUS at 07:03

## 2022-07-07 NOTE — PROGRESS NOTES
"Subjective   Patient ID: Laura Cuenca is a 41 y.o. female is here today for 6 month follow-up with a new MRI Brain done on 07/07/2022 at [facility].    Today patient states that she has been having alot dizziness, along with monthly HA's.    Patient, Provider, and MA are all wearing a mask in our office today.     History of Present Illness    This patient returns today.  She is doing well.  She really has no particular complaints at all.    The following portions of the patient's history were reviewed and updated as appropriate: allergies, current medications, past family history, past medical history, past social history, past surgical history and problem list.    Review of Systems   Constitutional: Negative for chills and fever.   HENT: Negative for congestion.    Neurological: Positive for dizziness and headaches.   Psychiatric/Behavioral: Negative for confusion.       I have reviewed the review of systems as documented by my MA.      Objective     Vitals:    07/12/22 1328   BP: 129/84   Cuff Size: Adult   Pulse: 90   Temp: 97.8 °F (36.6 °C)   SpO2: 96%   Weight: 58 kg (127 lb 13.9 oz)   Height: 152.4 cm (60\")     Body mass index is 24.97 kg/m².      Physical Exam  Neurological:      Mental Status: She is alert and oriented to person, place, and time.       Neurologic Exam     Mental Status   Oriented to person, place, and time.           Assessment & Plan   Independent Review of Radiographic Studies:      I personally reviewed the images from the following studies.    I reviewed an MRI of her brain done on 8 July.  This shows a possible recurrence right on the floor of the frontal fossa.  It may have gotten a couple of millimeters bigger.  But is otherwise the same as an MRI in January.    Medical Decision Making:      I told the patient from my point of view I think we can just scan her once a year at this point.    Diagnoses and all orders for this visit:    1. Dermoid cyst of brain (HCC) (Primary)  -     MRI Brain " With & Without Contrast; Future      Return in about 1 year (around 7/12/2023).

## 2022-07-12 ENCOUNTER — OFFICE VISIT (OUTPATIENT)
Dept: NEUROSURGERY | Facility: CLINIC | Age: 42
End: 2022-07-12

## 2022-07-12 VITALS
OXYGEN SATURATION: 96 % | TEMPERATURE: 97.8 F | HEART RATE: 90 BPM | DIASTOLIC BLOOD PRESSURE: 84 MMHG | SYSTOLIC BLOOD PRESSURE: 129 MMHG | WEIGHT: 127.87 LBS | BODY MASS INDEX: 25.1 KG/M2 | HEIGHT: 60 IN

## 2022-07-12 DIAGNOSIS — D33.2 DERMOID CYST OF BRAIN: Primary | ICD-10-CM

## 2022-07-12 PROCEDURE — 99213 OFFICE O/P EST LOW 20 MIN: CPT | Performed by: NEUROLOGICAL SURGERY

## 2022-08-02 ENCOUNTER — OFFICE VISIT (OUTPATIENT)
Dept: NEUROLOGY | Facility: CLINIC | Age: 42
End: 2022-08-02

## 2022-08-02 VITALS
DIASTOLIC BLOOD PRESSURE: 68 MMHG | BODY MASS INDEX: 24.54 KG/M2 | SYSTOLIC BLOOD PRESSURE: 108 MMHG | HEIGHT: 60 IN | OXYGEN SATURATION: 99 % | HEART RATE: 84 BPM | WEIGHT: 125 LBS

## 2022-08-02 DIAGNOSIS — G43.711 INTRACTABLE CHRONIC MIGRAINE WITHOUT AURA AND WITH STATUS MIGRAINOSUS: Primary | ICD-10-CM

## 2022-08-02 DIAGNOSIS — D33.2 DERMOID CYST OF BRAIN: ICD-10-CM

## 2022-08-02 PROCEDURE — 99213 OFFICE O/P EST LOW 20 MIN: CPT | Performed by: NURSE PRACTITIONER

## 2022-08-02 RX ORDER — AMITRIPTYLINE HYDROCHLORIDE 25 MG/1
50 TABLET, FILM COATED ORAL NIGHTLY
Qty: 180 TABLET | Refills: 3 | Status: SHIPPED | OUTPATIENT
Start: 2022-08-02

## 2022-08-02 RX ORDER — TOPIRAMATE 50 MG/1
100 TABLET, FILM COATED ORAL 2 TIMES DAILY
Qty: 120 TABLET | Refills: 5 | Status: SHIPPED | OUTPATIENT
Start: 2022-08-02 | End: 2022-10-24 | Stop reason: SDUPTHER

## 2022-08-02 RX ORDER — GALCANEZUMAB 120 MG/ML
120 INJECTION, SOLUTION SUBCUTANEOUS
Qty: 1 ML | Refills: 11 | Status: SHIPPED | OUTPATIENT
Start: 2022-08-02

## 2022-08-02 NOTE — PROGRESS NOTES
Subjective:     Patient ID: Laura Cuenca is a 41 y.o. female presenting for 12-week follow-up.  She has a history of migraine headaches and at her last visit 12 weeks ago I started her on Emgality.  She is taking 120 mg every 30 days and this seems to work much better for her.  She is getting about 1 migraine per week.  She has rizatriptan or Nurtec to use as needed.  She does feel that the rizatriptan works better for her than the Nurtec so she tends to take that more often.  She is also taking topiramate 50 mg 2 tablets twice daily and amitriptyline 50 mg nightly.      When I initially saw the patient in January 2021 I ordered an MRI of her brain.  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  She had surgery on February 26, 2021 with Dr. Carver.  She had a follow-up appointment with him recently and there is possible recurrent cyst, however his plan is to continue to monitor this with yearly MRIs.    History of Present Illness  The following portions of the patient's history were reviewed and updated as appropriate: allergies, current medications, past family history, past medical history, past social history, past surgical history and problem list.    Review of Systems   Eyes: Positive for photophobia (right eye) and visual disturbance (right eye). Negative for redness.   Musculoskeletal: Positive for gait problem (falls). Negative for back pain, neck pain and neck stiffness.   Neurological: Positive for dizziness and headaches (4 a month). Negative for tremors, seizures, syncope, facial asymmetry, speech difficulty, weakness, light-headedness and numbness.     I have reviewed and confirmed the accuracy of the patient's history: Chief complaint, HPI, ROS, Subjective and Past Family Social History as entered by the MA/KASIAN/RN. Miguel Dong, APRMARIA TERESA 08/02/22       Objective:    Neurologic Exam  General: Well nourished, well developed, and in no  acute distress.  HEENT: Normocephalic/atraumatic. Mucous membranes moist. Sclerae anicteric.   Heart: Regular rate and rhythm. No murmurs, rubs or gallops.  Lungs: Clear to auscultation bilaterally.  Skin: No notable rashes or lesions on the visible surfaces.   Extremities: No clubbing, cyanosis or significant edema.   Psychiatric: Pleasant, cooperative, and appropriate.   Neurologic Exam:  Mental Status:  Alert and oriented. Speech is fluent. Comprehension is intact.   Cranial Nerves II-XII: Pupils equal, round, reactive to light. Extraocular movements are full and conjugate in all directions. Pursuit movements do not provoke any apparent dizziness or discomfort.  No nystagmus noted.  Hearing is intact to voice. Facial strength and sensation are preserved and symmetric. Tongue and palate midline. Voice non-hoarse, non-dysarthric.   Motor: Normal bulk and tone of bilateral upper and lower extremities. Strength is 5/5 in all 4 extremities both proximally and distally. There are no abnormal or involuntary movements noted.  Sensation: Intact to light touch throughout. Romberg was negative with no significant sway.   Coordination: Fully intact. Finger-to-nose performed accurately bilaterally.  Reflexes: The deep tendon reflexes are 2+/4 in bilateral biceps, brachioradialis, triceps, patella, and Achilles.  No pathologic reflexes were noted.  Gait: Normal. No ataxia or apraxia.         Physical Exam    Assessment/Plan:     Diagnoses and all orders for this visit:    1. Intractable chronic migraine without aura and with status migrainosus (Primary)  -     Ambulatory Referral to Neurosurgery    2. Dermoid cyst of brain (HCC)  -     Ambulatory Referral to Neurosurgery    Other orders  -     galcanezumab-gnlm (Emgality) 120 MG/ML auto-injector pen; Inject 1 mL under the skin into the appropriate area as directed Every 30 (Thirty) Days.  Dispense: 1 mL; Refill: 11  -     topiramate (Topamax) 50 MG tablet; Take 2 tablets by  mouth 2 (Two) Times a Day.  Dispense: 120 tablet; Refill: 5  -     amitriptyline (ELAVIL) 25 MG tablet; Take 2 tablets by mouth Every Night.  Dispense: 180 tablet; Refill: 3        Her headaches have improved and are better controlled on the Emgality 120 mg every 30 days.  She will continue this along with topiramate 50 mg 2 tablets twice daily and amitriptyline 50 mg nightly.  She will continue rizatriptan as needed.  This does work better for her than the Nurtec, however she has the [facility] should she need it.  She does not need a refill on the Nurtec today.  She is requesting a second opinion for the dermoid cyst.  I put in a referral for this today as well.  She will call with any problems, otherwise is to follow-up in 6 months.

## 2022-09-07 RX ORDER — RIMEGEPANT SULFATE 75 MG/75MG
TABLET, ORALLY DISINTEGRATING ORAL
Qty: 8 TABLET | Refills: 3 | Status: SHIPPED | OUTPATIENT
Start: 2022-09-07 | End: 2023-02-02

## 2022-10-03 RX ORDER — RIZATRIPTAN BENZOATE 10 MG/1
TABLET ORAL
Qty: 12 TABLET | Refills: 2 | Status: SHIPPED | OUTPATIENT
Start: 2022-10-03 | End: 2023-02-02 | Stop reason: SDUPTHER

## 2022-10-19 RX ORDER — RIZATRIPTAN BENZOATE 10 MG/1
TABLET ORAL
Qty: 12 TABLET | Refills: 2 | OUTPATIENT
Start: 2022-10-19

## 2022-10-24 ENCOUNTER — TELEPHONE (OUTPATIENT)
Dept: NEUROLOGY | Facility: CLINIC | Age: 42
End: 2022-10-24

## 2022-10-24 RX ORDER — TOPIRAMATE 50 MG/1
100 TABLET, FILM COATED ORAL 2 TIMES DAILY
Qty: 120 TABLET | Refills: 5 | Status: SHIPPED | OUTPATIENT
Start: 2022-10-24 | End: 2023-02-02 | Stop reason: SDUPTHER

## 2022-10-24 NOTE — TELEPHONE ENCOUNTER
----- Message from Laura Cuenca sent at 10/22/2022  9:49 AM EDT -----  Regarding: Refill  Contact: [phone]  I’m sorry I should have been clearer in my message. I’m not referring to the rizatriptan. Although vital for me right now I am trying to get my topirimate refilled. I’ve been out of it completely for days and thank god for the triptan.

## 2023-02-02 ENCOUNTER — OFFICE VISIT (OUTPATIENT)
Dept: NEUROLOGY | Facility: CLINIC | Age: 43
End: 2023-02-02
Payer: COMMERCIAL

## 2023-02-02 VITALS — HEART RATE: 87 BPM | DIASTOLIC BLOOD PRESSURE: 74 MMHG | SYSTOLIC BLOOD PRESSURE: 122 MMHG | OXYGEN SATURATION: 99 %

## 2023-02-02 DIAGNOSIS — D33.2 DERMOID CYST OF BRAIN: ICD-10-CM

## 2023-02-02 DIAGNOSIS — G43.711 INTRACTABLE CHRONIC MIGRAINE WITHOUT AURA AND WITH STATUS MIGRAINOSUS: Primary | ICD-10-CM

## 2023-02-02 PROCEDURE — 99213 OFFICE O/P EST LOW 20 MIN: CPT | Performed by: NURSE PRACTITIONER

## 2023-02-02 RX ORDER — TOPIRAMATE 100 MG/1
100 TABLET, FILM COATED ORAL 2 TIMES DAILY
Qty: 180 TABLET | Refills: 3 | Status: SHIPPED | OUTPATIENT
Start: 2023-02-02

## 2023-02-02 RX ORDER — TIZANIDINE 4 MG/1
4 TABLET ORAL NIGHTLY PRN
Qty: 30 TABLET | Refills: 5 | Status: SHIPPED | OUTPATIENT
Start: 2023-02-02

## 2023-02-02 RX ORDER — RIZATRIPTAN BENZOATE 10 MG/1
TABLET ORAL
Qty: 12 TABLET | Refills: 5 | Status: SHIPPED | OUTPATIENT
Start: 2023-02-02

## 2023-02-02 NOTE — PROGRESS NOTES
Subjective   Laura Cuenca is a 42 y.o. female presenting for follow-up.  I last saw the patient in August 2022.  She has a history of migraine headaches and is taking Emgality 120 mg every 30 days along with rizatriptan as needed.  She is also taking topiramate 100 mg twice a day.  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  She had surgery for this on February 26, 2021 with Dr. Carver.  She is continuing to follow with him.  She has a repeat MRI this June.  Her headaches have been well controlled since her last visit.  She does state that occasionally she will have some neck pain with an occipital headache that may last for a few days.  This is not overly bothersome to her and she says usually she does not feel the need to take anything.    History of Present Illness     Review of Systems   Eyes: Positive for visual disturbance. Negative for double vision.   Gastrointestinal: Positive for nausea. Negative for diarrhea and vomiting.   Musculoskeletal: Positive for neck pain and neck stiffness. Negative for back pain.   Neurological: Positive for dizziness and headache (getting better). Negative for tremors, seizures, syncope, facial asymmetry, speech difficulty, weakness, light-headedness, numbness, memory problem and confusion.   Psychiatric/Behavioral: Positive for sleep disturbance and stress. The patient is nervous/anxious.        I have reviewed and confirmed the accuracy of the patient's history: Chief complaint, HPI, ROS, Subjective and Past Family Social History as entered by the MA/KASIAN/RN. Little Stephens MA 02/02/23      Objective     Physical Examination:  General Appearance:  Well developed, well nourished, well groomed, alert, and cooperative.  HEENT: Normocephalic.    Neck and Spine: Normal range of motion.  Normal alignment. No mass or tenderness. No bruits.  Cardiac: Regular rate and rhythm. No murmurs.  Peripheral Vasculature: Radial  and pedal pulses are equal and symmetric.   Extremities: No edema or deformities. Normal joint ROM.  Skin: No rashes or birth marks.      Neurological examination:   Higher Integrative Function: Oriented to time, place, and person. Normal registration, recall attention span and concentration. Normal language including comprehension, spontaneous speech, repetition, reading, writing, naming and vocabulary. No neglect with normal visual-spatial function and construction. Normal fund of knowledge and higher integrative function.   CN II: Pupils are equal, round and reactive to light. Normal visual acuity and visual fields.   CN III IV VI: Extraocular movements are full without nystagmus.   CN V: Normal facial sensation and strength of muscles of mastication.   CN VII: Facial movements are symmetric. No weakness.   CN VIII: Auditory acuity is normal.  CN IX & X: Symmetric palatal movement.   CN XI: Sternocleidomastoid and trapezius are normal. No weakness.   CN XII: The tongue is midline. No atrophy or fasciculations.  Motor: Normal muscle strength, bulk and tone in upper and lower extremities. No fasciculations, rigidity, spasticity, or abnormal movements.   Reflexes: 2+ in the upper and lower extremities. Plantar responses are flexor.   Sensation: Normal light touch, pinprick, vibration, temperature, and proprioception in the arms and legs.   Station and Gait: Normal gait and station.   Coordination: Finger to nose test shows no dysmetria. Rapid alternating movements are normal. Heel to shin is normal.           Assessment & Plan   Diagnoses and all orders for this visit:    1. Intractable chronic migraine without aura and with status migrainosus (Primary)    2. Dermoid cyst of brain (HCC)    Other orders  -     topiramate (Topamax) 100 MG tablet; Take 1 tablet by mouth 2 (Two) Times a Day.  Dispense: 180 tablet; Refill: 3  -     rizatriptan (MAXALT) 10 MG tablet; TAKE ONE TABLET BY MOUTH AT ONSET OF HEADACHE, MAY  REPEAT ONCE IN 2 HOURS IF NEEDED  Dispense: 12 tablet; Refill: 5  -     tiZANidine (ZANAFLEX) 4 MG tablet; Take 1 tablet by mouth At Night As Needed (headache).  Dispense: 30 tablet; Refill: 5    She is doing much better.  Her headaches have been well controlled.  She has unfortunately not been able to use the MyCare card for Emgality so it remains very expensive for her.  We will continue to give her samples for now while we look into this.  She will also continue topiramate 100 mg twice a day and rizatriptan as needed.  Regarding her neck pain and occipital headaches we discussed trying tizanidine 4 mg nightly as needed.  She can take this nightly or she can use it as needed when the problem seems to flare.  She will call with any problems.  Follow-up annually, sooner if needed.

## 2023-09-12 RX ORDER — AMITRIPTYLINE HYDROCHLORIDE 25 MG/1
TABLET, FILM COATED ORAL
Qty: 180 TABLET | Refills: 3 | Status: SHIPPED | OUTPATIENT
Start: 2023-09-12

## 2023-09-12 RX ORDER — TOPIRAMATE 50 MG/1
TABLET, FILM COATED ORAL
Qty: 120 TABLET | Refills: 5 | Status: SHIPPED | OUTPATIENT
Start: 2023-09-12

## 2024-01-31 ENCOUNTER — TELEPHONE (OUTPATIENT)
Dept: NEUROLOGY | Facility: CLINIC | Age: 44
End: 2024-01-31
Payer: COMMERCIAL

## 2024-01-31 NOTE — TELEPHONE ENCOUNTER
Had called pt on rescheduling due to provider being out of office on feb.8. Pt agreed to reschedule for the 5th at 8:00am

## 2024-02-02 ENCOUNTER — OFFICE VISIT (OUTPATIENT)
Dept: FAMILY MEDICINE CLINIC | Facility: CLINIC | Age: 44
End: 2024-02-02
Payer: COMMERCIAL

## 2024-02-02 VITALS
OXYGEN SATURATION: 100 % | SYSTOLIC BLOOD PRESSURE: 128 MMHG | WEIGHT: 139 LBS | HEART RATE: 78 BPM | HEIGHT: 60 IN | TEMPERATURE: 97.6 F | DIASTOLIC BLOOD PRESSURE: 92 MMHG | BODY MASS INDEX: 27.29 KG/M2

## 2024-02-02 DIAGNOSIS — Z13.6 SCREENING FOR ISCHEMIC HEART DISEASE: ICD-10-CM

## 2024-02-02 DIAGNOSIS — H65.02 NON-RECURRENT ACUTE SEROUS OTITIS MEDIA OF LEFT EAR: ICD-10-CM

## 2024-02-02 DIAGNOSIS — Z00.00 ENCOUNTER FOR HEALTH MAINTENANCE EXAMINATION IN ADULT: Primary | ICD-10-CM

## 2024-02-02 DIAGNOSIS — Z13.0 SCREENING FOR DEFICIENCY ANEMIA: ICD-10-CM

## 2024-02-02 RX ORDER — METHYLPREDNISOLONE 4 MG/1
TABLET ORAL
Qty: 21 EACH | Refills: 0 | Status: SHIPPED | OUTPATIENT
Start: 2024-02-02

## 2024-02-02 NOTE — PROGRESS NOTES
"Chief Complaint  Establish Care (New pt establishing today with dr padilla  see neurologist for brain tumor f/u  and neuro take care of her migraine ) and Hypothyroidism ( No complains would like dr padilla to  take over her thyroid )    Subjective        Laura Cuenca presents to [facility]  History of Present Illness  43-year-old female with history of dermoid cyst, migraines, hypothyroidism who presents to establish care today.    Sees Dr. Carver for dermoid cyst of the brain.  Had craniotomy in 2021.  Last seen July 2023 without evidence of recurrence.  Undergoes yearly MRI surveillance.    Follows with neurology for migraines.  Is currently on Elavil, topamax, and Emgality. Rizatriptan, tizandine PRN. Takes magnesium.    Hypothyroidism stable on 125 mcg daily levothyroxine.          Objective   Vital Signs:  /92   Pulse 78   Temp 97.6 °F (36.4 °C)   Ht 152.4 cm (60\")   Wt 63 kg (139 lb)   SpO2 100%   BMI 27.15 kg/m²   Estimated body mass index is 27.15 kg/m² as calculated from the following:    Height as of this encounter: 152.4 cm (60\").    Weight as of this encounter: 63 kg (139 lb).       BMI is >= 25 and <30. (Overweight) The following options were offered after discussion;: exercise counseling/recommendations and nutrition counseling/recommendations      Physical Exam  Constitutional:       General: She is not in acute distress.  HENT:      Right Ear: Tympanic membrane normal.      Ears:      Comments: Serous effusion of left ear  Eyes:      Conjunctiva/sclera: Conjunctivae normal.   Cardiovascular:      Rate and Rhythm: Normal rate and regular rhythm.   Pulmonary:      Effort: Pulmonary effort is normal. No respiratory distress.      Breath sounds: Normal breath sounds.   Skin:     General: Skin is warm and dry.   Neurological:      Mental Status: She is alert and oriented to person, place, and time.   Psychiatric:         Mood and Affect: Mood normal.         " Behavior: Behavior normal.        Result Review :    The following data was reviewed by: Georgiana Hammonds MD on 02/02/2024:    Data reviewed : see HPI             Assessment and Plan     Diagnoses and all orders for this visit:    1. Encounter for health maintenance examination in adult (Primary)  Assessment & Plan:  Colonoscopy: avg risk, due at age 46  Cervical Cancer Screening: scheduled with GYN next week  Mammogram:  LDCT: former smoker, does not qualify.  DEXA: indicated at age 65    Immunizations: eligible for tdap if not obtained in last 10yrs  Labs: ordered today  The ASCVD Risk score (Itzel MEJIA, et al., 2019) failed to calculate for the following reasons:    Cannot find a previous HDL lab    Cannot find a previous total cholesterol lab        Patient was counseled in regards to maintaining a healthy lifestyle, rich in whole grains, fruits and vegetables. Limit high saturated fats and processed sugars. Maintain an active lifestyle to promote overall health and well being.         2. Screening for deficiency anemia  -     CBC Auto Differential    3. Screening for ischemic heart disease  -     Comprehensive Metabolic Panel  -     ORDER: Hemoglobin A1c  -     TSH    4. Non-recurrent acute serous otitis media of left ear  -     methylPREDNISolone (MEDROL) 4 MG dose pack; Take as directed on package instructions.  Dispense: 21 each; Refill: 0             Follow Up     No follow-ups on file.  Patient was given instructions and counseling regarding her condition or for health maintenance advice. Please see specific information pulled into the AVS if appropriate.

## 2024-02-02 NOTE — ASSESSMENT & PLAN NOTE
Colonoscopy: avg risk, due at age 46  Cervical Cancer Screening: scheduled with GYN next week  Mammogram:  LDCT: former smoker, does not qualify.  DEXA: indicated at age 66    Immunizations: eligible for tdap if not obtained in last 10yrs  Labs: ordered today  The ASCVD Risk score (Itzel MEJIA, et al., 2019) failed to calculate for the following reasons:    Cannot find a previous HDL lab    Cannot find a previous total cholesterol lab        Patient was counseled in regards to maintaining a healthy lifestyle, rich in whole grains, fruits and vegetables. Limit high saturated fats and processed sugars. Maintain an active lifestyle to promote overall health and well being.

## 2024-02-03 LAB
ALBUMIN SERPL-MCNC: 4.7 G/DL (ref 3.9–4.9)
ALBUMIN/GLOB SERPL: 1.7 {RATIO} (ref 1.2–2.2)
ALP SERPL-CCNC: 135 IU/L (ref 44–121)
ALT SERPL-CCNC: 26 IU/L (ref 0–32)
AST SERPL-CCNC: 23 IU/L (ref 0–40)
BASOPHILS # BLD AUTO: 0.1 X10E3/UL (ref 0–0.2)
BASOPHILS NFR BLD AUTO: 1 %
BILIRUB SERPL-MCNC: 0.2 MG/DL (ref 0–1.2)
BUN SERPL-MCNC: 11 MG/DL (ref 6–24)
BUN/CREAT SERPL: 15 (ref 9–23)
CALCIUM SERPL-MCNC: 10 MG/DL (ref 8.7–10.2)
CHLORIDE SERPL-SCNC: 109 MMOL/L (ref 96–106)
CO2 SERPL-SCNC: 21 MMOL/L (ref 20–29)
CREAT SERPL-MCNC: 0.73 MG/DL (ref 0.57–1)
EGFRCR SERPLBLD CKD-EPI 2021: 105 ML/MIN/1.73
EOSINOPHIL # BLD AUTO: 0.2 X10E3/UL (ref 0–0.4)
EOSINOPHIL NFR BLD AUTO: 3 %
ERYTHROCYTE [DISTWIDTH] IN BLOOD BY AUTOMATED COUNT: 11.9 % (ref 11.7–15.4)
GLOBULIN SER CALC-MCNC: 2.7 G/DL (ref 1.5–4.5)
GLUCOSE SERPL-MCNC: 98 MG/DL (ref 70–99)
HBA1C MFR BLD: 5.5 % (ref 4.8–5.6)
HCT VFR BLD AUTO: 47.8 % (ref 34–46.6)
HGB BLD-MCNC: 16 G/DL (ref 11.1–15.9)
IMM GRANULOCYTES # BLD AUTO: 0 X10E3/UL (ref 0–0.1)
IMM GRANULOCYTES NFR BLD AUTO: 0 %
LYMPHOCYTES # BLD AUTO: 3.2 X10E3/UL (ref 0.7–3.1)
LYMPHOCYTES NFR BLD AUTO: 45 %
MCH RBC QN AUTO: 31.3 PG (ref 26.6–33)
MCHC RBC AUTO-ENTMCNC: 33.5 G/DL (ref 31.5–35.7)
MCV RBC AUTO: 94 FL (ref 79–97)
MONOCYTES # BLD AUTO: 1.1 X10E3/UL (ref 0.1–0.9)
MONOCYTES NFR BLD AUTO: 16 %
NEUTROPHILS # BLD AUTO: 2.5 X10E3/UL (ref 1.4–7)
NEUTROPHILS NFR BLD AUTO: 35 %
PLATELET # BLD AUTO: 345 X10E3/UL (ref 150–450)
POTASSIUM SERPL-SCNC: 4.4 MMOL/L (ref 3.5–5.2)
PROT SERPL-MCNC: 7.4 G/DL (ref 6–8.5)
RBC # BLD AUTO: 5.11 X10E6/UL (ref 3.77–5.28)
SODIUM SERPL-SCNC: 144 MMOL/L (ref 134–144)
TSH SERPL DL<=0.005 MIU/L-ACNC: 0.63 UIU/ML (ref 0.45–4.5)
WBC # BLD AUTO: 7.1 X10E3/UL (ref 3.4–10.8)

## 2024-02-05 ENCOUNTER — OFFICE VISIT (OUTPATIENT)
Dept: NEUROLOGY | Facility: CLINIC | Age: 44
End: 2024-02-05
Payer: COMMERCIAL

## 2024-02-05 ENCOUNTER — SPECIALTY PHARMACY (OUTPATIENT)
Dept: NEUROLOGY | Facility: CLINIC | Age: 44
End: 2024-02-05
Payer: COMMERCIAL

## 2024-02-05 VITALS
DIASTOLIC BLOOD PRESSURE: 72 MMHG | OXYGEN SATURATION: 97 % | WEIGHT: 138.8 LBS | HEIGHT: 60 IN | SYSTOLIC BLOOD PRESSURE: 110 MMHG | BODY MASS INDEX: 27.25 KG/M2 | HEART RATE: 70 BPM

## 2024-02-05 DIAGNOSIS — R74.8 ELEVATED ALKALINE PHOSPHATASE LEVEL: ICD-10-CM

## 2024-02-05 DIAGNOSIS — D33.2 DERMOID CYST OF BRAIN: ICD-10-CM

## 2024-02-05 DIAGNOSIS — G43.711 INTRACTABLE CHRONIC MIGRAINE WITHOUT AURA AND WITH STATUS MIGRAINOSUS: Primary | ICD-10-CM

## 2024-02-05 DIAGNOSIS — D75.1 ERYTHROCYTOSIS: Primary | ICD-10-CM

## 2024-02-05 PROCEDURE — 99213 OFFICE O/P EST LOW 20 MIN: CPT | Performed by: NURSE PRACTITIONER

## 2024-02-05 RX ORDER — GALCANEZUMAB 120 MG/ML
120 INJECTION, SOLUTION SUBCUTANEOUS
Qty: 1 ML | Refills: 11 | Status: SHIPPED | OUTPATIENT
Start: 2024-02-05

## 2024-02-05 RX ORDER — TOPIRAMATE 100 MG/1
100 TABLET, FILM COATED ORAL 2 TIMES DAILY
Qty: 180 TABLET | Refills: 3 | Status: SHIPPED | OUTPATIENT
Start: 2024-02-05

## 2024-02-05 RX ORDER — AMITRIPTYLINE HYDROCHLORIDE 25 MG/1
50 TABLET, FILM COATED ORAL NIGHTLY
Qty: 180 TABLET | Refills: 3 | Status: SHIPPED | OUTPATIENT
Start: 2024-02-05

## 2024-02-05 RX ORDER — ZOLMITRIPTAN 5 MG/1
TABLET, ORALLY DISINTEGRATING ORAL
Qty: 9 TABLET | Refills: 12 | Status: SHIPPED | OUTPATIENT
Start: 2024-02-05

## 2024-02-05 RX ORDER — TIZANIDINE 4 MG/1
4 TABLET ORAL NIGHTLY PRN
Qty: 30 TABLET | Refills: 5 | Status: SHIPPED | OUTPATIENT
Start: 2024-02-05

## 2024-02-05 NOTE — LETTER
February 5, 2024       No Recipients    Patient: Laura Cuenca   YOB: 1980   Date of Visit: 2/5/2024     Dear Georgiana Hammonds MD:       Thank you for referring Laura Cuenca to me for evaluation. Below are the relevant portions of my assessment and plan of care.    If you have questions, please do not hesitate to call me. I look forward to following  along with you.         Sincerely,        JULIA Landon        CC:   No Recipients    Miguel Dong APRN  02/05/24 0832  Sign when Signing Visit  Subjective  Laura Cuenca is a 43 y.o. female presenting for follow up for migraine headaches. She has been doing well. She has a history of a dermoid cyst arising from the planum sphenoid tail and compressing the surrounding brain.  She had surgery for this on February 26, 2021 with Dr. Carver.  She continues to follow with them.  She had a repeat MRI this past July showing no regrowth.  He recommended a repeat scan in 1 year.  She is taking topiramate 100 mg twice daily and Emgality 120 mg every 30 days.  She takes tizanidine 4 mg nightly as needed.  She only takes this if she has a migraine before she goes to bed or if her neck is been bothering her.  She is using rizatriptan 10 mg as needed.  She states today that she does not feel the rizatriptan is working as well for her as it previously did.  She also notes that Emgality is going to cost her $400.    History of Present Illness     Review of Systems    I have reviewed and confirmed the accuracy of the patient's history: Chief complaint, HPI, ROS, Subjective, and Past Family Social History as entered by the MA/KASIAN/RN. Mikaela Caballero MA 02/02/24      Objective    Physical Examination:  General Appearance:  Well developed, well nourished, well groomed, alert, and cooperative.  HEENT: Normocephalic.    Neck and Spine: Normal range of motion.  Normal alignment. No mass or tenderness. No bruits.  Cardiac: Regular rate and rhythm. No  murmurs.  Peripheral Vasculature: Radial and pedal pulses are equal and symmetric.   Extremities: No edema or deformities. Normal joint ROM.  Skin: No rashes or birth marks.      Neurological examination:   Higher Integrative Function: Oriented to time, place, and person. Normal registration, recall attention span and concentration. Normal language including comprehension, spontaneous speech, repetition, reading, writing, naming and vocabulary. No neglect with normal visual-spatial function and construction. Normal fund of knowledge and higher integrative function.   CN II: Pupils are equal, round and reactive to light. Normal visual acuity and visual fields.   CN III IV VI: Extraocular movements are full without nystagmus.   CN V: Normal facial sensation and strength of muscles of mastication.   CN VII: Facial movements are symmetric. No weakness.   CN VIII: Auditory acuity is normal.  CN IX & X: Symmetric palatal movement.   CN XI: Sternocleidomastoid and trapezius are normal. No weakness.   CN XII: The tongue is midline. No atrophy or fasciculations.  Motor: Normal muscle strength, bulk and tone in upper and lower extremities. No fasciculations, rigidity, spasticity, or abnormal movements.   Reflexes: 2+ in the upper and lower extremities. Plantar responses are flexor.   Sensation: Normal light touch, pinprick, vibration, temperature, and proprioception in the arms and legs.   Station and Gait: Normal gait and station.   Coordination: Finger to nose test shows no dysmetria. Rapid alternating movements are normal. Heel to shin is normal.           Assessment & Plan  Diagnoses and all orders for this visit:    1. Intractable chronic migraine without aura and with status migrainosus (Primary)    2. Dermoid cyst of brain    Other orders  -     ZOLMitriptan (ZOMIG-ZMT) 5 MG disintegrating tablet; Take one tablet by mouth as needed for migraine. May repeat in 2 hours if needed. Do not exceed 2 tablets in 24 hours.   Dispense: 9 tablet; Refill: 12  -     amitriptyline (ELAVIL) 25 MG tablet; Take 2 tablets by mouth Every Night.  Dispense: 180 tablet; Refill: 3  -     topiramate (Topamax) 100 MG tablet; Take 1 tablet by mouth 2 (Two) Times a Day.  Dispense: 180 tablet; Refill: 3  -     tiZANidine (ZANAFLEX) 4 MG tablet; Take 1 tablet by mouth At Night As Needed (headache).  Dispense: 30 tablet; Refill: 5    Overall she has been doing well, but requests something different than the Emgality since it is going to cost her so much.  It may be that her savings card needs to be renewed and we will look into this for her.  If it remains expensive we can look into switching this to Aimovig or Ajovy.  She will continue topiramate 100 mg twice a day and tizanidine 4 mg nightly as needed.  I am going to change her rizatriptan to zolmitriptan 5 mg dissolvable tablet.  Hopefully this will work better for her.  If this does not work as well she will call and we can try something else.  We did discuss sumatriptan injection if needed.    Follow-up annually, sooner if needed.

## 2024-02-06 NOTE — PROGRESS NOTES
Please let her know lab results:    1. Alkaline phosphatase is slightly elevated - this can be indicative of gallstones or increased bone turnover.     2. Red blood cell counts (hemoglobin/hematocrit) are mildly elevated.     I would recommend that we repeat this lab work in 3months or so. Make sure you are well hydrated prior to lab work. Please make lab appt for this.

## 2024-02-07 ENCOUNTER — OFFICE VISIT (OUTPATIENT)
Dept: OBSTETRICS AND GYNECOLOGY | Facility: CLINIC | Age: 44
End: 2024-02-07
Payer: COMMERCIAL

## 2024-02-07 VITALS
SYSTOLIC BLOOD PRESSURE: 137 MMHG | WEIGHT: 140 LBS | DIASTOLIC BLOOD PRESSURE: 95 MMHG | BODY MASS INDEX: 27.48 KG/M2 | HEIGHT: 60 IN

## 2024-02-07 DIAGNOSIS — N92.6 MENSTRUAL PERIODS IRREGULAR: Primary | ICD-10-CM

## 2024-02-07 DIAGNOSIS — N84.1 CERVICAL POLYP: ICD-10-CM

## 2024-02-07 DIAGNOSIS — B37.9 YEAST INFECTION: ICD-10-CM

## 2024-02-07 PROCEDURE — 99203 OFFICE O/P NEW LOW 30 MIN: CPT | Performed by: STUDENT IN AN ORGANIZED HEALTH CARE EDUCATION/TRAINING PROGRAM

## 2024-02-07 PROCEDURE — 99459 PELVIC EXAMINATION: CPT | Performed by: STUDENT IN AN ORGANIZED HEALTH CARE EDUCATION/TRAINING PROGRAM

## 2024-02-07 NOTE — PROGRESS NOTES
Chief Complaint   Patient presents with    ngyn     Here today for irregular periods and has yeast infection        SUBJECTIVE:     Laura Cuenca is a 43 y.o. female who presents with irregular periods and yeast infection. She has been previously seen by [facility] but her physician has moved. She is concerned that she has an ongoing yeast infection as she has white abnormal discharge every day without itching. She was diagnosed 2-3 years ago with a yeast infection on her pap smear. She tried PO treatment without success. She reports OTC treatment has worked.   She is also concerned given irregular periods over the last year. She reports her periods were every 26-28 days up until last Spring when one was a week late. She then did not have a period in the summer for 60 days. It is sometimes a week early, sometimes a week late. She provided information from her phone regarding her periods below.     - lasted 5 days   No period in 23- lasted 4 days 43 days   23- lasted 4 days 20 days   1/3/23- lasted 5 days  30 days   23- lasted 3 days 27 days   23- lasted 3 days 22 days   23- lasted 4 days- 31 days   24- lasted 4 days      Contraception- none, partner is infertile  Denies family history of ovarian, colon and breast cancer. Reports her mother  of endometrial cancer at age 57 and her maternal grandmother also had uterine cancer in her 40s.     Past Medical History:   Diagnosis Date    Congenital deformity     TOES    Environmental allergies     Frontal mass of brain     Heartburn     ON OCC    High blood pressure     Insomnia     Lumbar disc disorder     STATES HAS THREE DISC IN BACK THAT HAVE BEEN FUSED SINCE BIRTH    Meningioma 2021    Migraine     PT STATED SHE HAS HAD MIGRAINES SINCE A CHILD. HAD AN MRI 2021--FRONTAL MASS.     Thyroid disease     HYPO    Vision loss       Past Surgical History:   Procedure Laterality Date    CRANIOTOMY FOR TUMOR  "N/A 2/26/2021    Procedure: BIFRONTAL CRANIOTOMY FOR TUMOR STEREOTACTIC, WITH FAT GRAFTING;  Surgeon: Gume Carver MD;  Location: [facility];  Service: Neurosurgery;  Laterality: N/A;    WISDOM TOOTH EXTRACTION        Social History     Tobacco Use    Smoking status: Former     Packs/day: 0.50     Years: 5.00     Additional pack years: 0.00     Total pack years: 2.50     Types: Cigarettes     Quit date: 2/25/2021     Years since quitting: 3.0    Smokeless tobacco: Never   Vaping Use    Vaping Use: Some days    Substances: THC    Devices: Disposable   Substance Use Topics    Alcohol use: Yes     Comment: once a month    Drug use: Yes     Types: Marijuana     Comment: WILL STOP TILL SURGERY COMPLETED. VAPES     OB History   No obstetric history on file.        Review of Systems   Genitourinary:  Positive for menstrual problem and vaginal discharge.   All other systems reviewed and are negative.      OBJECTIVE:   Vitals:    02/07/24 1353   BP: 137/95   Weight: 63.5 kg (140 lb)   Height: 152.4 cm (60\")        Physical Exam  Vitals reviewed. Exam conducted with a chaperone present.   Constitutional:       General: She is not in acute distress.  HENT:      Head: Normocephalic and atraumatic.      Right Ear: External ear normal.      Left Ear: External ear normal.   Eyes:      Extraocular Movements: Extraocular movements intact.      Pupils: Pupils are equal, round, and reactive to light.   Pulmonary:      Effort: Pulmonary effort is normal. No respiratory distress.   Abdominal:      General: There is no distension.      Palpations: Abdomen is soft.      Tenderness: There is no abdominal tenderness. There is no guarding or rebound.   Genitourinary:     General: Normal vulva.      Exam position: Lithotomy position.      Labia:         Right: No rash, tenderness, lesion or injury.         Left: No rash, tenderness, lesion or injury.       Urethra: No prolapse or urethral swelling.      Vagina: Vaginal discharge " (Small amount of white discharge thin present in vaginal vault) present. No erythema, tenderness, bleeding or lesions.      Cervix: Lesion (Cervical polyp protruding through external ceverical os approximately 7-8 mm) present.      Uterus: Not enlarged, not fixed and not tender.       Adnexa:         Right: No mass, tenderness or fullness.          Left: No mass, tenderness or fullness.     Musculoskeletal:         General: No deformity. Normal range of motion.      Cervical back: Normal range of motion and neck supple.   Lymphadenopathy:      Lower Body: No right inguinal adenopathy. No left inguinal adenopathy.   Skin:     General: Skin is warm and dry.   Neurological:      General: No focal deficit present.      Mental Status: She is alert and oriented to person, place, and time.   Psychiatric:         Mood and Affect: Mood normal.         Behavior: Behavior normal.         ASSESSMENT:     ICD-10-CM ICD-9-CM   1. Menstrual periods irregular  N92.6 626.4   2. Yeast infection  B37.9 112.9   3. Cervical polyp  N84.1 622.7       PLAN:   - Will obtain Estradiol, FSH, prolactin levels to evaluate irregular periods today. Also will obtain pelvic ultrasound to evaluate for structure abnormality. The cervical polyp noted on examination may be to irregular bleeding patterns but it would usually lead to more intermenstrual bleeding or postcoital bleeding.   -Collected NuSwab BV/Candida to evaluate for yeast infection. She may be getting recurrent yeast infections secondary to cervical polyp.   - Will have the patient return in 2-3 weeks for annual examination, pelvic ultrasound and removal of endocervical polyp.     See below for orders    Orders Placed This Encounter   Procedures    NuSwab BV & Candida - Swab, Vagina     Order Specific Question:   Release to patient     Answer:   Routine Release [[id number]]    US Non-ob Transvaginal     Standing Status:   Future     Number of Occurrences:   1     Standing Expiration Date:    2/26/2025     Order Specific Question:   Reason for Exam:     Answer:   irregular periods     Order Specific Question:   Release to patient     Answer:   Routine Release [[id number]]    Estradiol     Order Specific Question:   Release to patient     Answer:   Routine Release [[id number]]    Follicle Stimulating Hormone     Order Specific Question:   Release to patient     Answer:   Routine Release [[id number]]    Prolactin     Order Specific Question:   Release to patient     Answer:   Routine Release [[id number]]      Katarina Topete MD

## 2024-02-09 LAB
A VAGINAE DNA VAG QL NAA+PROBE: NORMAL SCORE
BVAB2 DNA VAG QL NAA+PROBE: NORMAL SCORE
C ALBICANS DNA VAG QL NAA+PROBE: NEGATIVE
C GLABRATA DNA VAG QL NAA+PROBE: NEGATIVE
MEGA1 DNA VAG QL NAA+PROBE: NORMAL SCORE

## 2024-02-12 ENCOUNTER — PATIENT MESSAGE (OUTPATIENT)
Dept: OBSTETRICS AND GYNECOLOGY | Facility: CLINIC | Age: 44
End: 2024-02-12
Payer: COMMERCIAL

## 2024-02-12 ENCOUNTER — PATIENT ROUNDING (BHMG ONLY) (OUTPATIENT)
Dept: OBSTETRICS AND GYNECOLOGY | Facility: CLINIC | Age: 44
End: 2024-02-12
Payer: COMMERCIAL

## 2024-02-12 DIAGNOSIS — R74.8 ELEVATED ALKALINE PHOSPHATASE LEVEL: ICD-10-CM

## 2024-02-12 DIAGNOSIS — D75.1 ERYTHROCYTOSIS: ICD-10-CM

## 2024-02-26 ENCOUNTER — OFFICE VISIT (OUTPATIENT)
Dept: OBSTETRICS AND GYNECOLOGY | Facility: CLINIC | Age: 44
End: 2024-02-26
Payer: COMMERCIAL

## 2024-02-26 VITALS
HEART RATE: 92 BPM | SYSTOLIC BLOOD PRESSURE: 132 MMHG | BODY MASS INDEX: 27.52 KG/M2 | WEIGHT: 140.2 LBS | DIASTOLIC BLOOD PRESSURE: 95 MMHG | HEIGHT: 60 IN

## 2024-02-26 DIAGNOSIS — N84.1 ENDOCERVICAL POLYP: ICD-10-CM

## 2024-02-26 DIAGNOSIS — N92.6 IRREGULAR PERIODS: ICD-10-CM

## 2024-02-26 DIAGNOSIS — Z01.419 WELL WOMAN EXAM WITH ROUTINE GYNECOLOGICAL EXAM: Primary | ICD-10-CM

## 2024-02-26 DIAGNOSIS — Z12.4 CERVICAL CANCER SCREENING: ICD-10-CM

## 2024-02-26 DIAGNOSIS — N80.03 ADENOMYOSIS: ICD-10-CM

## 2024-02-26 NOTE — PROGRESS NOTES
GYN Annual Exam     CC- Here for annual exam.     Laura Cuenca is a 43 y.o. female who presents for annual well woman exam. Periods have been irregular but more regular over the last 3 months, lasting  3-5  days. Dysmenorrhea:mild, occurring first 1-2 days of flow. Cyclic symptoms include none.   She was last seen on 24 for irregular periods and yeast infection. She was found to be negative for yeast infection and had a endocervical polyp noted on exam which she is here for removal today. She also underwent pelvic ultrasound today to rule out other causes of her irregular periods.     OB History    No obstetric history on file.         Current contraception:  boyfriend is infertile   History of abnormal Pap smear: no  Family history of uterine, colon or ovarian cancer: yes - Maternal grandmother had uterine cancer in her 40s; endometrial cancer in mother at age 57 and  from malignancy.   History of abnormal mammogram:  n/a  Family history of breast cancer: no  Last Pap : 2-3 years ago- normal     Past Medical History:   Diagnosis Date    Congenital deformity     TOES    Environmental allergies     Frontal mass of brain     Heartburn     ON OCC    High blood pressure     Insomnia     Lumbar disc disorder     STATES HAS THREE DISC IN BACK THAT HAVE BEEN FUSED SINCE BIRTH    Meningioma 2021    Migraine     PT STATED SHE HAS HAD MIGRAINES SINCE A CHILD. HAD AN MRI 2021--FRONTAL MASS.     Thyroid disease     HYPO    Vision loss        Past Surgical History:   Procedure Laterality Date    CRANIOTOMY FOR TUMOR N/A 2021    Procedure: BIFRONTAL CRANIOTOMY FOR TUMOR STEREOTACTIC, WITH FAT GRAFTING;  Surgeon: Gume Carver MD;  Location: [facility];  Service: Neurosurgery;  Laterality: N/A;    WISDOM TOOTH EXTRACTION           Current Outpatient Medications:     amitriptyline (ELAVIL) 25 MG tablet, Take 2 tablets by mouth Every Night., Disp: 180 tablet, Rfl: 3    aspirin 81 MG oral suspension, Take  81 mL by mouth Daily., Disp: , Rfl:     atorvastatin (LIPITOR) 10 MG tablet, Take 1 tablet by mouth Every Night., Disp: , Rfl:     fexofenadine (ALLEGRA) 180 MG tablet, Take 1 tablet by mouth Daily., Disp: , Rfl:     galcanezumab-gnlm (Emgality) 120 MG/ML auto-injector pen, Inject 1 mL under the skin into the appropriate area as directed Every 30 (Thirty) Days., Disp: 1 mL, Rfl: 11    ibuprofen (ADVIL,MOTRIN) 800 MG tablet, , Disp: , Rfl:     levothyroxine (SYNTHROID, LEVOTHROID) 125 MCG tablet, Take 1 tablet by mouth Daily., Disp: , Rfl:     MAGNESIUM PO, Take  by mouth Daily., Disp: , Rfl:     ondansetron (ZOFRAN) 4 MG tablet, Take 1 tablet by mouth Every 8 (Eight) Hours As Needed for Nausea or Vomiting., Disp: , Rfl:     tiZANidine (ZANAFLEX) 4 MG tablet, Take 1 tablet by mouth At Night As Needed (headache)., Disp: 30 tablet, Rfl: 5    topiramate (Topamax) 100 MG tablet, Take 1 tablet by mouth 2 (Two) Times a Day., Disp: 180 tablet, Rfl: 3    ZOLMitriptan (ZOMIG-ZMT) 5 MG disintegrating tablet, Take one tablet by mouth as needed for migraine. May repeat in 2 hours if needed. Do not exceed 2 tablets in 24 hours., Disp: 9 tablet, Rfl: 12    methylPREDNISolone (MEDROL) 4 MG dose pack, Take as directed on package instructions. (Patient not taking: Reported on 2/26/2024), Disp: 21 each, Rfl: 0    Allergies   Allergen Reactions    Egg White [Egg White (Egg Protein)] Other (See Comments)     GIVES HER GAS    Pollen Extract Unknown - Low Severity    Wheat Unknown - Low Severity    Percocet [Oxycodone-Acetaminophen] Rash       Social History     Tobacco Use    Smoking status: Former     Packs/day: 0.50     Years: 5.00     Additional pack years: 0.00     Total pack years: 2.50     Types: Cigarettes     Quit date: 2/25/2021     Years since quitting: 3.0    Smokeless tobacco: Never   Vaping Use    Vaping Use: Some days    Substances: THC    Devices: Disposable   Substance Use Topics    Alcohol use: Yes     Comment: once a  "month    Drug use: Yes     Types: Marijuana     Comment: WILL STOP TILL SURGERY COMPLETED. VAPES       Family History   Problem Relation Age of Onset    Heart disease Father     Hypertension Father     Heart disease Mother     Hypertension Mother     Cancer Mother     Diabetes Mother     Endometrial cancer Mother     Endometrial cancer Brother     Uterine cancer Maternal Grandmother     Malig Hyperthermia Neg Hx     Breast cancer Neg Hx     Ovarian cancer Neg Hx     Colon cancer Neg Hx        Review of Systems   Genitourinary:  Positive for menstrual problem.   All other systems reviewed and are negative.      /95   Pulse 92   Ht 152.4 cm (60\")   Wt 63.6 kg (140 lb 3.2 oz)   LMP 02/20/2024 (Exact Date)   BMI 27.38 kg/m²     Physical Exam  Vitals reviewed. Exam conducted with a chaperone present.   Constitutional:       General: She is not in acute distress.  HENT:      Head: Normocephalic and atraumatic.      Right Ear: External ear normal.      Left Ear: External ear normal.   Eyes:      Extraocular Movements: Extraocular movements intact.      Pupils: Pupils are equal, round, and reactive to light.   Cardiovascular:      Rate and Rhythm: Normal rate.   Pulmonary:      Effort: Pulmonary effort is normal. No respiratory distress.   Chest:   Breasts:     Right: No swelling, bleeding, inverted nipple, mass, nipple discharge, skin change or tenderness.      Left: No swelling, bleeding, inverted nipple, mass, nipple discharge, skin change or tenderness.   Abdominal:      General: There is no distension.      Palpations: Abdomen is soft.      Tenderness: There is no abdominal tenderness. There is no guarding or rebound.   Genitourinary:     General: Normal vulva.      Exam position: Lithotomy position.      Labia:         Right: No rash, tenderness, lesion or injury.         Left: No rash, tenderness, lesion or injury.       Urethra: No prolapse or urethral swelling.      Vagina: No vaginal discharge, " erythema, tenderness, bleeding or lesions.      Cervix: Normal.      Uterus: Not enlarged, not fixed and not tender.       Adnexa:         Right: No mass, tenderness or fullness.          Left: No mass, tenderness or fullness.           Musculoskeletal:         General: No deformity. Normal range of motion.      Cervical back: Normal range of motion and neck supple.   Lymphadenopathy:      Upper Body:      Right upper body: No supraclavicular or axillary adenopathy.      Left upper body: No supraclavicular or axillary adenopathy.      Lower Body: No right inguinal adenopathy. No left inguinal adenopathy.   Skin:     General: Skin is warm and dry.   Neurological:      General: No focal deficit present.      Mental Status: She is alert and oriented to person, place, and time.   Psychiatric:         Mood and Affect: Mood normal.         Behavior: Behavior normal.       Procedure  Patient gave verbal consent for removal of endocervical polyp noted on previous exam. A time out was performed. A speculum was placed and cervix visualized along with endocervical polyp. A pap smear was performed first. The polyp was then grasped with a ringed forceps and gently twisted until it was removed.  The specimen was placed in formalin and sent to pathology. Hemostasis achieved with silver nitrate. Patient tolerated procedure well.        Assessment     1) GYN annual well woman exam.   2) Cervical cancer screening  3) Endocervical polyp- removal performed today   4) Irregular menstrual periods   5) Suspected adenomyosis      Plan     1) Breast Health - Clinical breast exam yearly, Self breast awareness monthly, Mammogram every year. Patient to schedule mammogram at  today.   2) Pap - Collected pap smear with HPV cotesting for cervical cancer screening.   3) Smoking status- former smoker.   4) Activity recommends - Adult 150-300 min/week of multi-component physical activities that include balance training, aerobic and  physical strengthening.    5) Irregular menstrual periods/endocervical polyp- I discussed with the patient that I suspect that her irregular periods are due to endocervical polyp that was removed without difficulty today and patient tolerated it well. Reviewed pelvic ultrasound results that did demonstrate a normal sized, anteverted uterus with endometrial stripe measuring 0.66 cm without obvious endometrial masses and uterine body with heterogeneous texture consistent with adenomyosis. She also had normal appearing ovaries. Now, her bleeding could be consequence of suspected adenomyosis based on ultrasound and discussed that the patient does have options in the future of hormonal therapy with pills, patches, injections, implant, IUDs; medical therapy with TXA; surgical management with endometrial ablation or hysterectomy. Advised to monitor now that polyp is removed and contact the office for any further concerning bleeding. All questions and concerns answered.   6) Follow up prn and one year.       Katarina Topete MD

## 2024-02-28 LAB
CYTOLOGIST CVX/VAG CYTO: NORMAL
CYTOLOGY CVX/VAG DOC CYTO: NORMAL
CYTOLOGY CVX/VAG DOC THIN PREP: NORMAL
DX ICD CODE: NORMAL
HPV I/H RISK 4 DNA CVX QL PROBE+SIG AMP: NEGATIVE
Lab: NORMAL
OTHER STN SPEC: NORMAL
STAT OF ADQ CVX/VAG CYTO-IMP: NORMAL

## 2024-02-29 LAB
DX ICD CODE: NORMAL
DX ICD CODE: NORMAL
PATH REPORT.FINAL DX SPEC: NORMAL
PATH REPORT.GROSS SPEC: NORMAL
PATH REPORT.SITE OF ORIGIN SPEC: NORMAL
PATHOLOGIST NAME: NORMAL
PAYMENT PROCEDURE: NORMAL

## 2024-03-04 ENCOUNTER — PROCEDURE VISIT (OUTPATIENT)
Dept: OBSTETRICS AND GYNECOLOGY | Facility: CLINIC | Age: 44
End: 2024-03-04
Payer: COMMERCIAL

## 2024-03-04 DIAGNOSIS — Z12.31 VISIT FOR SCREENING MAMMOGRAM: Primary | ICD-10-CM

## 2024-03-04 PROCEDURE — 77067 SCR MAMMO BI INCL CAD: CPT | Performed by: STUDENT IN AN ORGANIZED HEALTH CARE EDUCATION/TRAINING PROGRAM

## 2024-03-04 PROCEDURE — 77063 BREAST TOMOSYNTHESIS BI: CPT | Performed by: STUDENT IN AN ORGANIZED HEALTH CARE EDUCATION/TRAINING PROGRAM

## 2024-03-05 DIAGNOSIS — N64.89 BREAST ASYMMETRY: ICD-10-CM

## 2024-03-05 DIAGNOSIS — N63.10 MASS OF RIGHT BREAST, UNSPECIFIED QUADRANT: Primary | ICD-10-CM

## 2024-03-06 ENCOUNTER — TELEPHONE (OUTPATIENT)
Dept: OBSTETRICS AND GYNECOLOGY | Facility: CLINIC | Age: 44
End: 2024-03-06
Payer: COMMERCIAL

## 2024-03-06 NOTE — TELEPHONE ENCOUNTER
Pt is aware of appt at [facility] 3/11/24 @ 8 & 830am for DX Right Mammo & Right Comp US suggested from her screening.

## 2024-03-06 NOTE — TELEPHONE ENCOUNTER
----- Message from Katarina Topete MD sent at 3/6/2024  8:38 AM EST -----  Could you call to get  these scheduled at [facility]? Thanks!

## 2024-03-11 ENCOUNTER — OFFICE VISIT (OUTPATIENT)
Dept: FAMILY MEDICINE CLINIC | Facility: CLINIC | Age: 44
End: 2024-03-11
Payer: COMMERCIAL

## 2024-03-11 VITALS
TEMPERATURE: 97.7 F | DIASTOLIC BLOOD PRESSURE: 82 MMHG | RESPIRATION RATE: 16 BRPM | WEIGHT: 142 LBS | SYSTOLIC BLOOD PRESSURE: 120 MMHG | OXYGEN SATURATION: 99 % | HEIGHT: 60 IN | HEART RATE: 100 BPM | BODY MASS INDEX: 27.88 KG/M2

## 2024-03-11 DIAGNOSIS — J01.91 ACUTE RECURRENT SINUSITIS, UNSPECIFIED LOCATION: Primary | ICD-10-CM

## 2024-03-11 PROCEDURE — 99214 OFFICE O/P EST MOD 30 MIN: CPT | Performed by: STUDENT IN AN ORGANIZED HEALTH CARE EDUCATION/TRAINING PROGRAM

## 2024-03-11 RX ORDER — METHYLPREDNISOLONE 4 MG/1
TABLET ORAL
Qty: 21 EACH | Refills: 0 | Status: SHIPPED | OUTPATIENT
Start: 2024-03-11

## 2024-03-11 RX ORDER — LEVOFLOXACIN 500 MG/1
500 TABLET, FILM COATED ORAL DAILY
Qty: 7 TABLET | Refills: 0 | Status: SHIPPED | OUTPATIENT
Start: 2024-03-11

## 2024-03-11 RX ORDER — LEVOTHYROXINE SODIUM 0.15 MG/1
150 TABLET ORAL DAILY
COMMUNITY
Start: 2024-02-19

## 2024-03-11 NOTE — PROGRESS NOTES
"Chief Complaint  Ear Fullness (ears stopped back up after the antibiotic. Sometimes there is pain and pt hearing is impacted.Sinuses are full and pt is having drainage that is causing coughing. She can tell a difference if taking Mucinex or Sudafed. It all goes away when she is on the steroid or antibiotic, it comes right back. Since the week  before thanksgiving. ) and Cough    Subjective        Laura Cuenca presents to [facility]  History of Present Illness  43-year-old female with history of dermoid cyst, migraines, hypothyroidism who presents for recurrent sinusitis.    Symptoms initially started in Nov - intermittent.   Most recently worsened with cough, sinus pressure, ear pain.     Treated with Augmentin/medrol dose abdirizak from [facility]. Improvement while on medications and then secondary worsening. Using flonase, zyrtec, decongestants.    No fevers, SOA.           Objective   Vital Signs:  /82   Pulse 100   Temp 97.7 °F (36.5 °C)   Resp 16   Ht 152.4 cm (60\")   Wt 64.4 kg (142 lb)   SpO2 99%   BMI 27.73 kg/m²   Estimated body mass index is 27.73 kg/m² as calculated from the following:    Height as of this encounter: 152.4 cm (60\").    Weight as of this encounter: 64.4 kg (142 lb).               Physical Exam  Constitutional:       General: She is not in acute distress.  HENT:      Head: Normocephalic and atraumatic.      Nose: Congestion and rhinorrhea present.      Mouth/Throat:      Mouth: Mucous membranes are moist.      Pharynx: Posterior oropharyngeal erythema present. No oropharyngeal exudate.   Eyes:      General: No scleral icterus.     Conjunctiva/sclera: Conjunctivae normal.   Cardiovascular:      Rate and Rhythm: Normal rate and regular rhythm.   Pulmonary:      Effort: Pulmonary effort is normal. No respiratory distress.      Breath sounds: Normal breath sounds. No wheezing or rhonchi.   Skin:     General: Skin is warm and dry.   Neurological:      Mental " Status: She is alert and oriented to person, place, and time.   Psychiatric:         Mood and Affect: Mood normal.         Behavior: Behavior normal.        Result Review :    The following data was reviewed by: Georgiana Hammonds MD on 03/11/2024:  Common labs          2/2/2024    14:05   Common Labs   Glucose 98    BUN 11    Creatinine 0.73    Sodium 144    Potassium 4.4    Chloride 109    Calcium 10.0    Total Protein 7.4    Albumin 4.7    Total Bilirubin 0.2    Alkaline Phosphatase 135    AST (SGOT) 23    ALT (SGPT) 26    WBC 7.1    Hemoglobin 16.0    Hematocrit 47.8    Platelets 345    Hemoglobin A1C 5.5      Data reviewed : none             Assessment and Plan     Diagnoses and all orders for this visit:    1. Acute recurrent sinusitis, unspecified location (Primary)  -     levoFLOXacin (Levaquin) 500 MG tablet; Take 1 tablet by mouth Daily.  Dispense: 7 tablet; Refill: 0  -     methylPREDNISolone (MEDROL) 4 MG dose pack; Take as directed on package instructions.  Dispense: 21 each; Refill: 0             Follow Up     No follow-ups on file.  Patient was given instructions and counseling regarding her condition or for health maintenance advice. Please see specific information pulled into the AVS if appropriate.

## 2024-03-11 NOTE — LETTER
March 11, 2024     Patient: Laura Cuenca   YOB: 1980   Date of Visit: 3/11/2024       To Whom It May Concern:    It is my medical opinion that Laura Cuenca may return to work on 3/13/24. Please allow her to work from home the remainder of the week to prevent spread of illness. 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     Please let me know if you have questions/concerns.           Sincerely,        Georgiana Hammonds MD    CC: No Recipients

## 2024-03-14 ENCOUNTER — APPOINTMENT (OUTPATIENT)
Dept: WOMENS IMAGING | Facility: HOSPITAL | Age: 44
End: 2024-03-14
Payer: COMMERCIAL

## 2024-03-14 PROCEDURE — 77065 DX MAMMO INCL CAD UNI: CPT | Performed by: RADIOLOGY

## 2024-03-14 PROCEDURE — G0279 TOMOSYNTHESIS, MAMMO: HCPCS | Performed by: RADIOLOGY

## 2024-03-14 PROCEDURE — 76641 ULTRASOUND BREAST COMPLETE: CPT | Performed by: RADIOLOGY

## 2024-03-14 PROCEDURE — 77061 BREAST TOMOSYNTHESIS UNI: CPT | Performed by: RADIOLOGY

## 2024-03-15 ENCOUNTER — TELEPHONE (OUTPATIENT)
Dept: OBSTETRICS AND GYNECOLOGY | Facility: CLINIC | Age: 44
End: 2024-03-15
Payer: COMMERCIAL

## 2024-03-15 DIAGNOSIS — N63.10 MASS OF MULTIPLE SITES OF RIGHT BREAST: ICD-10-CM

## 2024-03-15 DIAGNOSIS — N64.89 BREAST ASYMMETRY: ICD-10-CM

## 2024-03-15 DIAGNOSIS — R92.8 ABNORMAL MAMMOGRAM OF RIGHT BREAST: Primary | ICD-10-CM

## 2024-03-15 DIAGNOSIS — N63.10 MASS OF RIGHT BREAST, UNSPECIFIED QUADRANT: ICD-10-CM

## 2024-03-15 NOTE — TELEPHONE ENCOUNTER
Spoke with patient. She is aware of need for multiple ultrasound guided  biopsies of right breast. No additional questions at this time. Aware she should receive a call to schedule in the next 1-2 business days and will contact us if not contacted by Tuesday.

## 2024-03-15 NOTE — TELEPHONE ENCOUNTER
I received call from Ria with [facility] and she ask for pt to call her to work with scheduling these appts.    Pt is scheduled on 4/1/24 @ 8 & 9am  at [facility].

## 2024-03-21 RX ORDER — LEVOTHYROXINE SODIUM 0.15 MG/1
150 TABLET ORAL DAILY
Qty: 90 TABLET | Refills: 0 | Status: SHIPPED | OUTPATIENT
Start: 2024-03-21

## 2024-04-01 ENCOUNTER — LAB REQUISITION (OUTPATIENT)
Dept: LAB | Facility: HOSPITAL | Age: 44
End: 2024-04-01
Payer: COMMERCIAL

## 2024-04-01 ENCOUNTER — APPOINTMENT (OUTPATIENT)
Dept: WOMENS IMAGING | Facility: HOSPITAL | Age: 44
End: 2024-04-01
Payer: COMMERCIAL

## 2024-04-01 DIAGNOSIS — N63.0 UNSPECIFIED LUMP IN UNSPECIFIED BREAST: ICD-10-CM

## 2024-04-01 PROCEDURE — 88342 IMHCHEM/IMCYTCHM 1ST ANTB: CPT | Performed by: RADIOLOGY

## 2024-04-01 PROCEDURE — 19084 BX BREAST ADD LESION US IMAG: CPT | Performed by: RADIOLOGY

## 2024-04-01 PROCEDURE — A4648 IMPLANTABLE TISSUE MARKER: HCPCS | Performed by: RADIOLOGY

## 2024-04-01 PROCEDURE — 88305 TISSUE EXAM BY PATHOLOGIST: CPT | Performed by: RADIOLOGY

## 2024-04-01 PROCEDURE — 88341 IMHCHEM/IMCYTCHM EA ADD ANTB: CPT | Performed by: RADIOLOGY

## 2024-04-01 PROCEDURE — 19083 BX BREAST 1ST LESION US IMAG: CPT | Performed by: RADIOLOGY

## 2024-04-01 PROCEDURE — 19000 PUNCTURE ASPIR CYST BREAST: CPT | Performed by: RADIOLOGY

## 2024-04-03 LAB
CYTO UR: NORMAL
DX PRELIMINARY: NORMAL
LAB AP CASE REPORT: NORMAL
LAB AP INTRADEPARTMENTAL CONSULT: NORMAL
LAB AP SPECIAL STAINS: NORMAL
PATH REPORT.FINAL DX SPEC: NORMAL
PATH REPORT.GROSS SPEC: NORMAL

## 2024-04-04 DIAGNOSIS — R92.8 ABNORMAL MAMMOGRAM OF RIGHT BREAST: ICD-10-CM

## 2024-04-04 DIAGNOSIS — N63.10 MASS OF RIGHT BREAST, UNSPECIFIED QUADRANT: Primary | ICD-10-CM

## 2024-04-04 DIAGNOSIS — N63.10 MASS OF MULTIPLE SITES OF RIGHT BREAST: ICD-10-CM

## 2024-04-04 DIAGNOSIS — N63.10 MASS OF RIGHT BREAST, UNSPECIFIED QUADRANT: ICD-10-CM

## 2024-04-12 DIAGNOSIS — R92.8 ABNORMAL MAMMOGRAM OF RIGHT BREAST: ICD-10-CM

## 2024-04-12 DIAGNOSIS — N63.10 MASS OF MULTIPLE SITES OF RIGHT BREAST: ICD-10-CM

## 2024-05-11 LAB
ALBUMIN SERPL-MCNC: 4.2 G/DL (ref 3.5–5.2)
ALBUMIN/GLOB SERPL: 1.8 G/DL
ALP SERPL-CCNC: 117 U/L (ref 39–117)
ALT SERPL-CCNC: 22 U/L (ref 1–33)
AST SERPL-CCNC: 18 U/L (ref 1–32)
BASOPHILS # BLD AUTO: 0.03 10*3/MM3 (ref 0–0.2)
BASOPHILS NFR BLD AUTO: 0.5 % (ref 0–1.5)
BILIRUB SERPL-MCNC: <0.2 MG/DL (ref 0–1.2)
BUN SERPL-MCNC: 11 MG/DL (ref 6–20)
BUN/CREAT SERPL: 16.9 (ref 7–25)
CALCIUM SERPL-MCNC: 9.2 MG/DL (ref 8.6–10.5)
CHLORIDE SERPL-SCNC: 113 MMOL/L (ref 98–107)
CO2 SERPL-SCNC: 21.3 MMOL/L (ref 22–29)
CREAT SERPL-MCNC: 0.65 MG/DL (ref 0.57–1)
EGFRCR SERPLBLD CKD-EPI 2021: 112.9 ML/MIN/1.73
EOSINOPHIL # BLD AUTO: 0.46 10*3/MM3 (ref 0–0.4)
EOSINOPHIL NFR BLD AUTO: 7 % (ref 0.3–6.2)
ERYTHROCYTE [DISTWIDTH] IN BLOOD BY AUTOMATED COUNT: 12.2 % (ref 12.3–15.4)
GGT SERPL-CCNC: 26 U/L (ref 5–36)
GLOBULIN SER CALC-MCNC: 2.4 GM/DL
GLUCOSE SERPL-MCNC: 93 MG/DL (ref 65–99)
HCT VFR BLD AUTO: 44.3 % (ref 34–46.6)
HGB BLD-MCNC: 14.9 G/DL (ref 12–15.9)
IMM GRANULOCYTES # BLD AUTO: 0.03 10*3/MM3 (ref 0–0.05)
IMM GRANULOCYTES NFR BLD AUTO: 0.5 % (ref 0–0.5)
LYMPHOCYTES # BLD AUTO: 2.35 10*3/MM3 (ref 0.7–3.1)
LYMPHOCYTES NFR BLD AUTO: 35.6 % (ref 19.6–45.3)
MCH RBC QN AUTO: 31 PG (ref 26.6–33)
MCHC RBC AUTO-ENTMCNC: 33.6 G/DL (ref 31.5–35.7)
MCV RBC AUTO: 92.3 FL (ref 79–97)
MONOCYTES # BLD AUTO: 1 10*3/MM3 (ref 0.1–0.9)
MONOCYTES NFR BLD AUTO: 15.1 % (ref 5–12)
NEUTROPHILS # BLD AUTO: 2.74 10*3/MM3 (ref 1.7–7)
NEUTROPHILS NFR BLD AUTO: 41.3 % (ref 42.7–76)
NRBC BLD AUTO-RTO: 0 /100 WBC (ref 0–0.2)
PLATELET # BLD AUTO: 269 10*3/MM3 (ref 140–450)
POTASSIUM SERPL-SCNC: 4.8 MMOL/L (ref 3.5–5.2)
PROT SERPL-MCNC: 6.6 G/DL (ref 6–8.5)
RBC # BLD AUTO: 4.8 10*6/MM3 (ref 3.77–5.28)
SODIUM SERPL-SCNC: 143 MMOL/L (ref 136–145)
WBC # BLD AUTO: 6.61 10*3/MM3 (ref 3.4–10.8)

## 2024-06-20 RX ORDER — LEVOTHYROXINE SODIUM 0.15 MG/1
150 TABLET ORAL DAILY
Qty: 90 TABLET | Refills: 0 | Status: SHIPPED | OUTPATIENT
Start: 2024-06-20

## 2024-07-02 ENCOUNTER — HOSPITAL ENCOUNTER (OUTPATIENT)
Dept: MRI IMAGING | Facility: HOSPITAL | Age: 44
Discharge: HOME | End: 2024-07-02
Admitting: NEUROLOGICAL SURGERY
Payer: COMMERCIAL

## 2024-07-02 DIAGNOSIS — D33.2 DERMOID CYST OF BRAIN: ICD-10-CM

## 2024-07-02 PROCEDURE — 70553 MRI BRAIN STEM W/O & W/DYE: CPT

## 2024-07-02 PROCEDURE — A9577 INJ MULTIHANCE: HCPCS | Performed by: NEUROLOGICAL SURGERY

## 2024-07-02 PROCEDURE — 0 GADOBENATE DIMEGLUMINE 529 MG/ML SOLUTION: Performed by: NEUROLOGICAL SURGERY

## 2024-07-02 RX ADMIN — GADOBENATE DIMEGLUMINE 13 ML: 529 INJECTION, SOLUTION INTRAVENOUS at 07:02

## 2024-07-15 NOTE — PROGRESS NOTES
"Subjective   Patient ID: Laura Cuenca is a 43 y.o. female is here today for 1 year follow-up for a new MRI Brain done on 7/2/2024.    Today patient states that she has issues with her balance, along with constant dizziness, and HA's, and vision loss in the R eye    History of Present Illness    This patient returns today.  She is doing fairly well.  She still has trouble with her right eye but no other significant complaints.  She has some dizziness and headaches which she has had all along.    The following portions of the patient's history were reviewed and updated as appropriate: allergies, current medications, past family history, past medical history, past social history, past surgical history, and problem list.    Review of Systems   Constitutional:  Negative for chills and fever.   HENT:  Negative for congestion.    Eyes:  Positive for visual disturbance.        R eye   Musculoskeletal:  Positive for gait problem.   Neurological:  Positive for dizziness and headaches.       I reviewed the review of systems listed by the patient and discussed by my MA    Objective     Vitals:    07/18/24 0830   BP: 121/80   Cuff Size: Adult   Pulse: 109   SpO2: 100%   Weight: 64.4 kg (142 lb)   Height: 152.4 cm (60\")     Body mass index is 27.73 kg/m².    Tobacco Use: Medium Risk (7/18/2024)    Patient History     Smoking Tobacco Use: Former     Smokeless Tobacco Use: Never     Passive Exposure: Not on file          Physical Exam  Neurological:      Mental Status: She is alert and oriented to person, place, and time.       Neurologic Exam     Mental Status   Oriented to person, place, and time.           Assessment & Plan   Independent Review of Radiographic Studies:      I personally reviewed the images from the following studies.    I reviewed an MRI of her brain done on 2 July of this year.  This shows a minimal increase in the size of the enhancing region in the cribriform plate.  The radiologist feels this is probably " recurrent tumor.  I am not totally sure I agree with that but we will continue to follow it.  It is still too small to consider any reoperation.      Medical Decision Making:      I told the patient about the imaging.  I told her if the tumor shows further growth next year then we will consider radiation therapy consult to see if we can arrest growth.  She also asked about a referral to an ophthalmologist I suggested she get into see Dr. Andi Chase.    Diagnoses and all orders for this visit:    1. Dermoid cyst of brain (Primary)  -     MRI Brain With & Without Contrast; Future      Return in about 1 year (around 7/18/2025).

## 2024-07-18 ENCOUNTER — OFFICE VISIT (OUTPATIENT)
Dept: NEUROSURGERY | Facility: CLINIC | Age: 44
End: 2024-07-18
Payer: COMMERCIAL

## 2024-07-18 VITALS
HEIGHT: 60 IN | DIASTOLIC BLOOD PRESSURE: 80 MMHG | OXYGEN SATURATION: 100 % | SYSTOLIC BLOOD PRESSURE: 121 MMHG | BODY MASS INDEX: 27.88 KG/M2 | HEART RATE: 109 BPM | WEIGHT: 142 LBS

## 2024-07-18 DIAGNOSIS — D33.2 DERMOID CYST OF BRAIN: Primary | ICD-10-CM

## 2024-08-12 RX ORDER — ATORVASTATIN CALCIUM 10 MG/1
10 TABLET, FILM COATED ORAL NIGHTLY
Qty: 90 TABLET | Refills: 1 | Status: SHIPPED | OUTPATIENT
Start: 2024-08-12

## 2024-09-23 RX ORDER — LEVOTHYROXINE SODIUM 150 UG/1
150 TABLET ORAL DAILY
Qty: 90 TABLET | Refills: 0 | Status: SHIPPED | OUTPATIENT
Start: 2024-09-23

## 2024-09-25 ENCOUNTER — TELEPHONE (OUTPATIENT)
Dept: OBSTETRICS AND GYNECOLOGY | Facility: CLINIC | Age: 44
End: 2024-09-25
Payer: COMMERCIAL

## 2024-09-25 DIAGNOSIS — N60.01 BREAST CYST, RIGHT: Primary | ICD-10-CM

## 2024-09-27 RX ORDER — TOPIRAMATE 50 MG/1
100 TABLET, FILM COATED ORAL 2 TIMES DAILY
Qty: 120 TABLET | Refills: 5 | Status: SHIPPED | OUTPATIENT
Start: 2024-09-27

## 2024-10-02 ENCOUNTER — APPOINTMENT (OUTPATIENT)
Dept: WOMENS IMAGING | Facility: HOSPITAL | Age: 44
End: 2024-10-02
Payer: COMMERCIAL

## 2024-10-02 PROCEDURE — 76642 ULTRASOUND BREAST LIMITED: CPT | Performed by: RADIOLOGY

## 2024-10-03 DIAGNOSIS — N60.01 BREAST CYST, RIGHT: ICD-10-CM

## 2024-10-16 DIAGNOSIS — Z09 FOLLOW-UP EXAM, 3-6 MONTHS SINCE PREVIOUS EXAM: Primary | ICD-10-CM

## 2024-10-16 DIAGNOSIS — N63.10 MASS OF RIGHT BREAST, UNSPECIFIED QUADRANT: ICD-10-CM

## 2024-12-09 RX ORDER — LEVOTHYROXINE SODIUM 150 UG/1
150 TABLET ORAL DAILY
Qty: 90 TABLET | Refills: 0 | Status: SHIPPED | OUTPATIENT
Start: 2024-12-09

## 2024-12-26 RX ORDER — LEVOTHYROXINE SODIUM 150 UG/1
150 TABLET ORAL DAILY
Qty: 90 TABLET | Refills: 0 | Status: SHIPPED | OUTPATIENT
Start: 2024-12-26

## 2025-01-08 RX ORDER — GALCANEZUMAB 120 MG/ML
120 INJECTION, SOLUTION SUBCUTANEOUS
Qty: 1 ML | Refills: 11 | Status: SHIPPED | OUTPATIENT
Start: 2025-01-08

## 2025-01-31 DIAGNOSIS — Z13.6 SCREENING FOR ISCHEMIC HEART DISEASE: ICD-10-CM

## 2025-01-31 DIAGNOSIS — Z00.00 ENCOUNTER FOR HEALTH MAINTENANCE EXAMINATION IN ADULT: Primary | ICD-10-CM

## 2025-01-31 DIAGNOSIS — Z13.0 SCREENING FOR DEFICIENCY ANEMIA: ICD-10-CM

## 2025-02-04 ENCOUNTER — SPECIALTY PHARMACY (OUTPATIENT)
Dept: NEUROLOGY | Facility: CLINIC | Age: 45
End: 2025-02-04
Payer: COMMERCIAL

## 2025-02-04 ENCOUNTER — OFFICE VISIT (OUTPATIENT)
Dept: NEUROLOGY | Facility: CLINIC | Age: 45
End: 2025-02-04
Payer: COMMERCIAL

## 2025-02-04 VITALS
HEART RATE: 100 BPM | SYSTOLIC BLOOD PRESSURE: 110 MMHG | OXYGEN SATURATION: 97 % | BODY MASS INDEX: 26.9 KG/M2 | DIASTOLIC BLOOD PRESSURE: 74 MMHG | HEIGHT: 60 IN | WEIGHT: 137 LBS

## 2025-02-04 DIAGNOSIS — G43.711 INTRACTABLE CHRONIC MIGRAINE WITHOUT AURA AND WITH STATUS MIGRAINOSUS: Primary | ICD-10-CM

## 2025-02-04 DIAGNOSIS — D33.2 DERMOID CYST OF BRAIN: ICD-10-CM

## 2025-02-04 PROCEDURE — 99213 OFFICE O/P EST LOW 20 MIN: CPT | Performed by: NURSE PRACTITIONER

## 2025-02-04 RX ORDER — TOPIRAMATE 100 MG/1
100 TABLET, FILM COATED ORAL 2 TIMES DAILY
Qty: 180 TABLET | Refills: 3 | Status: CANCELLED | OUTPATIENT
Start: 2025-02-04

## 2025-02-04 NOTE — PROGRESS NOTES
Subjective   Laura Cuenca is a 44 y.o. female presenting for follow-up for migraine headaches.  She has a history of a dermoid cyst arising from the planum sphenoid tail and compressing the surrounding brain.  She had surgery for this on February 26, 2021 with Dr. Carver.  She continues to follow with him.  She last saw him in July 2024.  He recommended a repeat scan in 1 year and if the scan showed growth he suggested possible radiation for the area.    She takes topiramate 100 mg twice a day, sumatriptan as needed, and has a prescription for Emgality every 30 days, however she says that her pharmacy has troubles blocking this and therefore she has not been taking it much.  She has not had it in over a month.  She does note that when she takes it she has a wearing off period of more frequent headaches for about 7 to 10 days each month.  She also has a prescription for tizanidine 4 mg nightly as needed.  She only takes this if she has a migraine before she goes to bed or if her neck is bothering her.    Migraine     Review of Systems   Neurological:  Positive for headache (4 to 5 a month). Negative for dizziness, tremors, seizures, syncope, facial asymmetry, speech difficulty, weakness, light-headedness, numbness, memory problem and confusion.     I have reviewed and confirmed the accuracy of the patient's history: Chief complaint, HPI, ROS, Subjective, and Past Family Social History as entered by the MA/KASIAN/RN. Little Stephens MA 02/04/25      Objective     Physical Examination:  General Appearance:  Well developed, well nourished, well groomed, alert, and cooperative.  HEENT: Normocephalic.    Neck and Spine: Normal range of motion.  Normal alignment. No mass or tenderness. No bruits.  Cardiac: Regular rate and rhythm. No murmurs.  Peripheral Vasculature: Radial and pedal pulses are equal and symmetric.   Extremities: No edema or deformities. Normal joint ROM.  Skin: No rashes or birth marks.      Neurological  examination:   Higher Integrative Function: Oriented to time, place, and person. Normal registration, recall attention span and concentration. Normal language including comprehension, spontaneous speech, repetition, reading, writing, naming and vocabulary. No neglect with normal visual-spatial function and construction. Normal fund of knowledge and higher integrative function.   CN II: Pupils are equal, round and reactive to light. Normal visual acuity and visual fields.   CN III IV VI: Extraocular movements are full without nystagmus.   CN V: Normal facial sensation and strength of muscles of mastication.   CN VII: Facial movements are symmetric. No weakness.   CN VIII: Auditory acuity is normal.  CN IX & X: Symmetric palatal movement.   CN XI: Sternocleidomastoid and trapezius are normal. No weakness.   CN XII: The tongue is midline. No atrophy or fasciculations.  Motor: Normal muscle strength, bulk and tone in upper and lower extremities. No fasciculations, rigidity, spasticity, or abnormal movements.   Reflexes: 2+ in the upper and lower extremities. Plantar responses are flexor.   Sensation: Normal light touch, pinprick, vibration, temperature, and proprioception in the arms and legs.   Station and Gait: Normal gait and station.   Coordination: Finger to nose test shows no dysmetria. Rapid alternating movements are normal. Heel to shin is normal.           Assessment & Plan   Diagnoses and all orders for this visit:    1. Intractable chronic migraine without aura and with status migrainosus (Primary)    2. Dermoid cyst of brain    I am going to have her switch the Emgality to Qulipta.  Hopefully this will resolve the wearing off that she sees for 7 to 10 days every month.  She will continue topiramate 100 mg twice daily.  She has a prescription for tizanidine 4 mg nightly as needed as well.  She only takes this if she has a migraine before she goes to bed or if her neck is bothering her.  She will continue  sumatriptan as needed.  Prescriptions renewed today.  She will follow-up with Dr. Carver in July.    Follow-up annually, sooner if needed.

## 2025-02-05 ENCOUNTER — SPECIALTY PHARMACY (OUTPATIENT)
Dept: NEUROLOGY | Facility: CLINIC | Age: 45
End: 2025-02-05
Payer: COMMERCIAL

## 2025-02-05 RX ORDER — ZOLMITRIPTAN 5 MG/1
TABLET, ORALLY DISINTEGRATING ORAL
Qty: 9 TABLET | Refills: 2 | Status: SHIPPED | OUTPATIENT
Start: 2025-02-05

## 2025-02-05 RX ORDER — TOPIRAMATE 100 MG/1
100 TABLET, FILM COATED ORAL 2 TIMES DAILY
Qty: 60 TABLET | Refills: 2 | Status: SHIPPED | OUTPATIENT
Start: 2025-02-05

## 2025-02-05 NOTE — PROGRESS NOTES
Specialty Pharmacy Patient Management Program  Neurology Initial Assessment     Laura Cuenca is a 44 y.o. female with chronic migraines seen by a Neurology provider and enrolled in the Neurology Patient Management program offered by [facility].  An initial outreach was conducted, including assessment of therapy appropriateness and specialty medication education for atogepant (Qulipta). The patient was introduced to services offered by [facility], including: regular assessments, refill coordination, curbside pick-up or mail order delivery options, prior authorization maintenance, and financial assistance programs as applicable. The patient was also provided with contact information for the pharmacy team.     Insurance Coverage & Financial Support  Commercial insurance plan & Qulipta Copay Card    Relevant Past Medical History and Comorbidities  Relevant medical history and concomitant health conditions were discussed with the patient. The patient's chart has been reviewed for relevant past medical history and comorbid health conditions and updated as necessary.   Past Medical History:   Diagnosis Date    Allergic     egg whites, oak trees, wheat    Congenital deformity     TOES    Environmental allergies     Frontal mass of brain     Heartburn     ON OCC    High blood pressure     Hypothyroidism 2016?    Insomnia     Lumbar disc disorder     STATES HAS THREE DISC IN BACK THAT HAVE BEEN FUSED SINCE BIRTH    Meningioma 02/2021    Migraine     PT STATED SHE HAS HAD MIGRAINES SINCE A CHILD. HAD AN MRI FEB 2021--FRONTAL MASS.     Thyroid disease     HYPO    Vision loss      Social History     Socioeconomic History    Marital status: Single   Tobacco Use    Smoking status: Former     Current packs/day: 0.00     Average packs/day: 0.5 packs/day for 20.0 years (10.0 ttl pk-yrs)     Types: Cigarettes     Start date: 2/25/2016     Quit date: 2/25/2021     Years since quitting:  3.9    Smokeless tobacco: Never   Vaping Use    Vaping status: Some Days    Substances: THC    Devices: Disposable   Substance and Sexual Activity    Alcohol use: Yes     Comment: Maybe one or two drinks every 6 weeks.    Drug use: Yes     Types: Marijuana     Comment: WILL STOP TILL SURGERY COMPLETED. VAPES    Sexual activity: Yes     Partners: Male     Birth control/protection: None     Problem list reviewed by Shannen Last RPH on 2/5/2025 at  2:16 PM      Allergies  Known allergies and reactions were discussed with the patient. The patient's chart has been reviewed for  allergy information and updated as necessary.   Allergies   Allergen Reactions    Egg White [Egg White (Egg Protein)] Other (See Comments)     GIVES HER GAS    Pollen Extract Unknown - Low Severity    Wheat Unknown - Low Severity    Percocet [Oxycodone-Acetaminophen] Rash     Allergies reviewed by Shannen Last RPH on 2/5/2025 at  2:15 PM      Relevant Laboratory Values  Common labs          5/10/2024    13:09 2/4/2025    09:27   Common Labs   Glucose 93  90    BUN 11  8    Creatinine 0.65  0.76    Sodium 143  144    Potassium 4.8  5.3    Chloride 113  110    Calcium 9.2  10.1    Total Protein 6.6  6.9    Albumin 4.2  4.5    Total Bilirubin <0.2  0.3    Alkaline Phosphatase 117  141    AST (SGOT) 18  22    ALT (SGPT) 22  18    WBC 6.61  7.2    Hemoglobin 14.9  16.4    Hematocrit 44.3  50.1    Platelets 269  298    Total Cholesterol  180    Triglycerides  143    HDL Cholesterol  64    LDL Cholesterol   91    Hemoglobin A1C  5.6        Lab Assessment  The above labs have been reviewed. No dose adjustments are needed for the specialty medication(s) based on the labs.       Current Medication List  This medication list has been reviewed with the patient and evaluated for any interactions or necessary modifications/recommendations, and updated to include all prescription medications, OTC medications, and supplements the patient is currently  taking.  This list reflects what is contained in the patient's profile, which has also been marked as reviewed to communicate to other providers it is the most up to date version of the patient's current medication therapy.     Current Outpatient Medications:     topiramate (Topamax) 100 MG tablet, Take 1 tablet by mouth 2 (Two) Times a Day., Disp: 60 tablet, Rfl: 2    ZOLMitriptan (ZOMIG-ZMT) 5 MG disintegrating tablet, Take one tablet by mouth as needed for migraine. May repeat in 2 hours if needed. Do not exceed 2 tablets in 24 hours., Disp: 9 tablet, Rfl: 2    amitriptyline (ELAVIL) 25 MG tablet, Take 2 tablets by mouth Every Night., Disp: 180 tablet, Rfl: 3    aspirin 81 MG oral suspension, Take 81 mL by mouth Daily., Disp: , Rfl:     Atogepant 60 MG tablet, Take 1 tablet by mouth Daily., Disp: 30 tablet, Rfl: 2    atorvastatin (LIPITOR) 10 MG tablet, Take 1 tablet by mouth Every Night., Disp: 90 tablet, Rfl: 1    fexofenadine (ALLEGRA) 180 MG tablet, Take 1 tablet by mouth Daily., Disp: , Rfl:     ibuprofen (ADVIL,MOTRIN) 800 MG tablet, , Disp: , Rfl:     levothyroxine (SYNTHROID, LEVOTHROID) 150 MCG tablet, TAKE 1 TABLET BY MOUTH DAILY, Disp: 90 tablet, Rfl: 0    MAGNESIUM PO, Take  by mouth Daily., Disp: , Rfl:     ondansetron (ZOFRAN) 4 MG tablet, Take 1 tablet by mouth Every 8 (Eight) Hours As Needed for Nausea or Vomiting., Disp: , Rfl:     tiZANidine (ZANAFLEX) 4 MG tablet, Take 1 tablet by mouth At Night As Needed (headache)., Disp: 30 tablet, Rfl: 5    Medicines reviewed by Shannen Last RPH on 2/5/2025 at  2:09 PM  Medicines reviewed by Shannen Last RPH on 2/5/2025 at  2:16 PM  Medicines reviewed by Shannen Last RPH on 2/5/2025 at  2:20 PM    Drug Interactions  None at this time        Initial Education Provided for Specialty Medication  The patient has been provided with the following education and any applicable administration techniques (i.e. self-injection) have been demonstrated for the  therapies indicated. All questions and concerns have been addressed prior to the patient receiving the medication, and the patient has verbalized understanding of the education and any materials provided.  Additional patient education shall be provided and documented upon request by the patient, provider or payer.      Atogepant (Qulipta) 60 mg tablet, take 1 tablet by mouth daily       Medication Expectations   Why am I taking this medication? You are taking this medication for migraine prophylaxis.   What should I expect while on this medication? You should expect to see a decrease in the frequency and severity of your migraines.   How does the medication work? Atogepant is a small molecule that binds to calcitonin gene-related peptide (CGRP) and blocks its binding to the receptor decreasing the severity of migraines.   How long will I be on this medication for? The amount of time you will be on this medication will be determined by your doctor and your response to the medication.    How do I take this medication? Take as directed on your prescription label. Take one tablet daily with or without food.   What are some possible side effects? Potential side effects including, but not limited to nausea, constipation and fatigue. Patient verbalized understanding.   What happens if I miss a dose? If you miss a dose of this medicine, take it as soon as possible. However, if it is almost time for your next dose, skip the missed dose and go back to your regular dosing schedule. Do not double doses.          Medication Safety   What are things I should warn my doctor immediately about? Hypersensitivity reactions, such as trouble breathing or swallowing.   What are things that I should be cautious of? Be cautious driving until you know how this drug will affect you.   What are some medications that can interact with this one? Ketoconazole, Itraconazole, cyclosporin, clarithromycin, rifampin, carbamazepine, phenytion, St.  Suman's Wort, efavirenz, and etravine.           Medication Storage/Handling   How should I handle this medication? Keep this medication our of reach of pets/children in original container.   How does this medication need to be stored? Store at room temperature away from heat/cold, sunlight or moisture.   How should I dispose of this medication? There should not be a need to dispose of this medication unless your provider decides to change the dose or therapy. If that is the case, take to your local police station for proper disposal. Some pharmacies also have take-back bins for medication drop-off.            Resources/Support   How can I remind myself to take this medication? You can download reminder apps to help you manage your refills. You may also set an alarm on your phone to remind you. The pharmacy carries pill boxes that you can place next to an area you pass everyday (such as where you place your car keys or where you charge your phone).   Is financial support available?  Yes, [employer] can provide co-pay cards if you have commercial insurance or patient assistance if you have Medicare or no insurance.    Which vaccines are recommended for me? Talk to your doctor about these vaccines: Flu, Coronavirus (COVID-19), Pneumococcal (pneumonia), Tdap, Hepatitis B, Zoster (shingles).           Adherence and Self-Administration  Adherence related to the patient's specialty therapy was discussed with the patient. The Adherence segment of this outreach has been reviewed and updated.   Is there a concern with patient's ability to self administer the medication correctly and without issue?: No  Were any potential barriers to adherence identified during the initial assessment or patient education?: No  Are there any concerns regarding the patient's understanding of the importance of medication adherence?: No  Methods for Supporting Patient Adherence and/or Self-Administration: None at this time     Goals of Therapy  Goals  related to the patient's specialty therapy were discussed with the patient. The Patient Goals segment of this outreach has been reviewed and updated.   Goals Addressed Today        Specialty Pharmacy General Goal      2/5/25: Patient new start of Qulipta 60 mg daily (previously on Emgality but switched due to medication wearing off prior to injection date), topiramate 100 mg twice daily, and rizatriptan as needed. Baseline has 4-5 migraines days monthly. Goal with migraine regimen is to reduce frequency and severity of migraines and headaches. No side effects or concerns with current medication regimen and plan.                  Reassessment Plan & Follow-Up  Medication Therapy Changes: Start atogepant 60 mg by mouth daily; Continue topiramate 100 mg by mouth twice daily, and zolmitriptan 5 mg ODT as needed for migraine treatment   Related Plans, Therapy Recommendations, or Therapy Problems to Be Addressed: Nothing further to be addressed at this time.  Pharmacist to perform regular reassessments no more than (6) months from the previous assessment.  Care Coordinator to set up future refill outreaches, coordinate prescription delivery, and escalate clinical questions to pharmacist.   Welcome information and patient satisfaction survey to be sent by specialty pharmacy team with patient's initial fill.    Attestation  Therapeutic appropriateness: Appropriate   I attest the patient was actively involved in and has agreed to the above plan of care. If the prescribed therapy is at any point deemed not appropriate based on the current or future assessments, a consultation will be initiated with the patient's specialty care provider to determine the best course of action. The revised plan of therapy will be documented along with any additional patient education provided. Discussed aforementioned material with patient by phone.    Shannen Last RPH  2/5/2025  14:31 EST

## 2025-02-17 ENCOUNTER — OFFICE VISIT (OUTPATIENT)
Dept: FAMILY MEDICINE CLINIC | Facility: CLINIC | Age: 45
End: 2025-02-17
Payer: COMMERCIAL

## 2025-02-17 VITALS
OXYGEN SATURATION: 98 % | BODY MASS INDEX: 26.7 KG/M2 | TEMPERATURE: 96.9 F | WEIGHT: 136 LBS | HEIGHT: 60 IN | SYSTOLIC BLOOD PRESSURE: 152 MMHG | DIASTOLIC BLOOD PRESSURE: 88 MMHG | HEART RATE: 96 BPM

## 2025-02-17 DIAGNOSIS — G43.711 INTRACTABLE CHRONIC MIGRAINE WITHOUT AURA AND WITH STATUS MIGRAINOSUS: ICD-10-CM

## 2025-02-17 DIAGNOSIS — M25.512 LEFT SHOULDER PAIN, UNSPECIFIED CHRONICITY: ICD-10-CM

## 2025-02-17 DIAGNOSIS — E03.9 HYPOTHYROIDISM, UNSPECIFIED TYPE: Primary | ICD-10-CM

## 2025-02-17 DIAGNOSIS — R74.8 ELEVATED ALKALINE PHOSPHATASE LEVEL: ICD-10-CM

## 2025-02-17 DIAGNOSIS — E78.5 HYPERLIPIDEMIA, UNSPECIFIED HYPERLIPIDEMIA TYPE: ICD-10-CM

## 2025-02-17 DIAGNOSIS — D33.2 DERMOID CYST OF BRAIN: ICD-10-CM

## 2025-02-17 DIAGNOSIS — Z23 NEED FOR VACCINATION: ICD-10-CM

## 2025-02-17 PROCEDURE — 90472 IMMUNIZATION ADMIN EACH ADD: CPT | Performed by: STUDENT IN AN ORGANIZED HEALTH CARE EDUCATION/TRAINING PROGRAM

## 2025-02-17 PROCEDURE — 99214 OFFICE O/P EST MOD 30 MIN: CPT | Performed by: STUDENT IN AN ORGANIZED HEALTH CARE EDUCATION/TRAINING PROGRAM

## 2025-02-17 PROCEDURE — 90471 IMMUNIZATION ADMIN: CPT | Performed by: STUDENT IN AN ORGANIZED HEALTH CARE EDUCATION/TRAINING PROGRAM

## 2025-02-17 PROCEDURE — 90715 TDAP VACCINE 7 YRS/> IM: CPT | Performed by: STUDENT IN AN ORGANIZED HEALTH CARE EDUCATION/TRAINING PROGRAM

## 2025-02-17 PROCEDURE — 90656 IIV3 VACC NO PRSV 0.5 ML IM: CPT | Performed by: STUDENT IN AN ORGANIZED HEALTH CARE EDUCATION/TRAINING PROGRAM

## 2025-02-17 RX ORDER — LEVOTHYROXINE SODIUM 137 UG/1
150 TABLET ORAL
Qty: 90 TABLET | Refills: 0 | Status: ON HOLD | OUTPATIENT
Start: 2025-02-17 | End: 2025-02-26

## 2025-02-17 RX ORDER — ATORVASTATIN CALCIUM 10 MG/1
10 TABLET, FILM COATED ORAL NIGHTLY
Qty: 90 TABLET | Refills: 3 | Status: SHIPPED | OUTPATIENT
Start: 2025-02-17

## 2025-02-17 NOTE — PROGRESS NOTES
"Chief Complaint  Annual Exam (Pt is on new medication due to a brain tumor./Pt would like to discuss a medical marijuana card. /Pt would like to make sure she is up to date on vaccinations.), Hip Pain (Pt complains of hip pain on both sides periodically. ), Shoulder Pain (Pt has shoulder pain on both sides.), and Menstrual Problem (Pt reports irregular cycles and has concerns. )    Subjective        Laura Cuenca presents to [facility]  History of Present Illness  44-year-old female with history of dermoid cyst, migraines, hypothyroidism who presents to for follow up and below conditions.    Sees Dr. Carver for dermoid cyst of the brain.  Had craniotomy in 2021.  Last seen July 2024 with concern for slight recurrence of dermoid cyst. She is also being followed by Opthalmo. May begin radiation in the summer to reduce size per patient.     Follows with neurology for migraines.  Is currently on Elavil, topamax, and Qulipta. Zolmig, PRN. Takes magnesium.    Hypothyroidism - recent TSH suppressed. Menstrual irregularity and incr anxiety present.     Elev ALP - new. Asymptomatic.     Bilateral, intermittent shoulder pain - left >right. Worse with activity, better with rest. OTC Tylenol PRN.  Bilateral , intermittent hip pain. Told her left leg shorter than right in past. Feels this may be reason for hip pain.             Objective   Vital Signs:  /88   Pulse 96   Temp 96.9 °F (36.1 °C)   Ht 152.4 cm (60\")   Wt 61.7 kg (136 lb)   SpO2 98%   BMI 26.56 kg/m²   Estimated body mass index is 26.56 kg/m² as calculated from the following:    Height as of this encounter: 152.4 cm (60\").    Weight as of this encounter: 61.7 kg (136 lb).        Physical Exam  Constitutional:       General: She is not in acute distress.  Eyes:      Conjunctiva/sclera: Conjunctivae normal.   Cardiovascular:      Rate and Rhythm: Normal rate and regular rhythm.   Pulmonary:      Effort: Pulmonary effort is " normal. No respiratory distress.   Abdominal:      Palpations: Abdomen is soft.      Tenderness: There is no abdominal tenderness.   Musculoskeletal:         General: No swelling or tenderness. Normal range of motion.      Right lower leg: No edema.      Left lower leg: No edema.      Comments: Bilateral shoulders - normal ROM, no TTP, neg lift off, NEER neg, empty can neg  Bilateral hips - normal ROM, no TTP, neg straight leg, neg ARUNA   Neurological:      Mental Status: She is alert and oriented to person, place, and time.   Psychiatric:         Mood and Affect: Mood normal.         Behavior: Behavior normal.        Result Review :  The following data was reviewed by: Georgiana Hammonds MD on 02/17/2025:  Common labs          5/10/2024    13:09 2/4/2025    09:27   Common Labs   Glucose 93  90    BUN 11  8    Creatinine 0.65  0.76    Sodium 143  144    Potassium 4.8  5.3    Chloride 113  110    Calcium 9.2  10.1    Albumin 4.2  4.5    Total Bilirubin <0.2  0.3    Alkaline Phosphatase 117  141    AST (SGOT) 18  22    ALT (SGPT) 22  18    WBC 6.61  7.2    Hemoglobin 14.9  16.4    Hematocrit 44.3  50.1    Platelets 269  298    Total Cholesterol  180    Triglycerides  143    HDL Cholesterol  64    LDL Cholesterol   91    Hemoglobin A1C  5.6      Data reviewed : none           Assessment and Plan   Diagnoses and all orders for this visit:    1. Hypothyroidism, unspecified type (Primary)  -     levothyroxine (SYNTHROID, LEVOTHROID) 137 MCG tablet; Take 1 tablet by mouth Every Morning.  Dispense: 90 tablet; Refill: 0  -     Cancel: TSH; Future  -     TSH; Future    2. Need for vaccination  -     Fluzone >6mos ([id number])  -     Tdap Vaccine Greater Than or Equal To 8yo IM    3. Left shoulder pain, unspecified chronicity  -     Ibuprofen 3 %, Gabapentin 10 %, Baclofen 2 %, lidocaine 4 %; Apply 1-2 g topically to the appropriate area as directed 3 (Three) to 4 (Four) times daily.  Dispense: 90 g; Refill: 0    4. Elevated  alkaline phosphatase level  -     Comprehensive Metabolic Panel; Future  -     Gamma GT; Future    5. Hyperlipidemia, unspecified hyperlipidemia type  -     atorvastatin (LIPITOR) 10 MG tablet; Take 1 tablet by mouth Every Night.  Dispense: 90 tablet; Refill: 3    6. Dermoid cyst of brain    7. Intractable chronic migraine without aura and with status migrainosus  -     amitriptyline (ELAVIL) 25 MG tablet; Take 2 tablets by mouth Every Night.  Dispense: 180 tablet; Refill: 3             Follow Up   Return in about 5 months (around 8/1/2025) for Recheck.  Patient was given instructions and counseling regarding her condition or for health maintenance advice. Please see specific information pulled into the AVS if appropriate.

## 2025-02-24 ENCOUNTER — APPOINTMENT (OUTPATIENT)
Dept: ULTRASOUND IMAGING | Facility: HOSPITAL | Age: 45
End: 2025-02-24
Payer: COMMERCIAL

## 2025-02-24 ENCOUNTER — HOSPITAL ENCOUNTER (OUTPATIENT)
Facility: HOSPITAL | Age: 45
Setting detail: OBSERVATION
Discharge: HOME | End: 2025-02-26
Attending: EMERGENCY MEDICINE | Admitting: INTERNAL MEDICINE
Payer: COMMERCIAL

## 2025-02-24 ENCOUNTER — APPOINTMENT (OUTPATIENT)
Dept: GENERAL RADIOLOGY | Facility: HOSPITAL | Age: 45
End: 2025-02-24
Payer: COMMERCIAL

## 2025-02-24 DIAGNOSIS — K80.20 CALCULUS OF GALLBLADDER WITHOUT CHOLECYSTITIS WITHOUT OBSTRUCTION: Primary | ICD-10-CM

## 2025-02-24 DIAGNOSIS — R11.2 NAUSEA AND VOMITING, UNSPECIFIED VOMITING TYPE: ICD-10-CM

## 2025-02-24 DIAGNOSIS — R53.1 GENERALIZED WEAKNESS: ICD-10-CM

## 2025-02-24 DIAGNOSIS — E87.1 HYPONATREMIA: ICD-10-CM

## 2025-02-24 DIAGNOSIS — E87.6 HYPOKALEMIA: ICD-10-CM

## 2025-02-24 DIAGNOSIS — J18.9 PNEUMONIA OF LEFT LOWER LOBE DUE TO INFECTIOUS ORGANISM: ICD-10-CM

## 2025-02-24 DIAGNOSIS — E03.9 HYPOTHYROIDISM, UNSPECIFIED TYPE: ICD-10-CM

## 2025-02-24 DIAGNOSIS — K80.00 CHOLELITHIASIS AND ACUTE CHOLECYSTITIS WITHOUT OBSTRUCTION: ICD-10-CM

## 2025-02-24 PROBLEM — R00.0 TACHYCARDIA: Status: ACTIVE | Noted: 2025-02-24

## 2025-02-24 PROBLEM — R91.8 LUNG INFILTRATE ON CT: Status: ACTIVE | Noted: 2025-02-24

## 2025-02-24 PROBLEM — D32.9 MENINGIOMA: Status: ACTIVE | Noted: 2025-02-24

## 2025-02-24 PROBLEM — E78.5 DYSLIPIDEMIA: Status: ACTIVE | Noted: 2025-02-24

## 2025-02-24 PROBLEM — R82.90 ABNORMAL URINALYSIS: Status: ACTIVE | Noted: 2025-02-24

## 2025-02-24 LAB
ALBUMIN SERPL-MCNC: 3.5 G/DL (ref 3.5–5.2)
ALBUMIN/GLOB SERPL: 0.8 G/DL
ALP SERPL-CCNC: 175 U/L (ref 39–117)
ALT SERPL W P-5'-P-CCNC: 165 U/L (ref 1–33)
ANION GAP SERPL CALCULATED.3IONS-SCNC: 12 MMOL/L (ref 5–15)
AST SERPL-CCNC: 210 U/L (ref 1–32)
B PARAPERT DNA SPEC QL NAA+PROBE: NOT DETECTED
B PERT DNA SPEC QL NAA+PROBE: NOT DETECTED
BACTERIA UR QL AUTO: ABNORMAL /HPF
BASOPHILS # BLD AUTO: 0.04 10*3/MM3 (ref 0–0.2)
BASOPHILS NFR BLD AUTO: 0.4 % (ref 0–1.5)
BILIRUB SERPL-MCNC: 0.3 MG/DL (ref 0–1.2)
BILIRUB UR QL STRIP: NEGATIVE
BUN SERPL-MCNC: 12 MG/DL (ref 6–20)
BUN/CREAT SERPL: 16.7 (ref 7–25)
C PNEUM DNA NPH QL NAA+NON-PROBE: NOT DETECTED
CALCIUM SPEC-SCNC: 9.6 MG/DL (ref 8.6–10.5)
CHLORIDE SERPL-SCNC: 95 MMOL/L (ref 98–107)
CLARITY UR: ABNORMAL
CO2 SERPL-SCNC: 21 MMOL/L (ref 22–29)
COLOR UR: YELLOW
CREAT SERPL-MCNC: 0.72 MG/DL (ref 0.57–1)
D-LACTATE SERPL-SCNC: 1.1 MMOL/L (ref 0.5–2)
DEPRECATED RDW RBC AUTO: 42.5 FL (ref 37–54)
EGFRCR SERPLBLD CKD-EPI 2021: 106.5 ML/MIN/1.73
EOSINOPHIL # BLD AUTO: 0.01 10*3/MM3 (ref 0–0.4)
EOSINOPHIL NFR BLD AUTO: 0.1 % (ref 0.3–6.2)
ERYTHROCYTE [DISTWIDTH] IN BLOOD BY AUTOMATED COUNT: 12.7 % (ref 12.3–15.4)
FLUAV SUBTYP SPEC NAA+PROBE: NOT DETECTED
FLUBV RNA ISLT QL NAA+PROBE: NOT DETECTED
GEN 5 1HR TROPONIN T REFLEX: 15 NG/L
GLOBULIN UR ELPH-MCNC: 4.4 GM/DL
GLUCOSE SERPL-MCNC: 129 MG/DL (ref 65–99)
GLUCOSE UR STRIP-MCNC: NEGATIVE MG/DL
HADV DNA SPEC NAA+PROBE: NOT DETECTED
HCG SERPL QL: NEGATIVE
HCOV 229E RNA SPEC QL NAA+PROBE: NOT DETECTED
HCOV HKU1 RNA SPEC QL NAA+PROBE: NOT DETECTED
HCOV NL63 RNA SPEC QL NAA+PROBE: NOT DETECTED
HCOV OC43 RNA SPEC QL NAA+PROBE: NOT DETECTED
HCT VFR BLD AUTO: 44.4 % (ref 34–46.6)
HGB BLD-MCNC: 15.1 G/DL (ref 12–15.9)
HGB UR QL STRIP.AUTO: ABNORMAL
HMPV RNA NPH QL NAA+NON-PROBE: NOT DETECTED
HPIV1 RNA ISLT QL NAA+PROBE: NOT DETECTED
HPIV2 RNA SPEC QL NAA+PROBE: NOT DETECTED
HPIV3 RNA NPH QL NAA+PROBE: NOT DETECTED
HPIV4 P GENE NPH QL NAA+PROBE: NOT DETECTED
HYALINE CASTS UR QL AUTO: ABNORMAL /LPF
IMM GRANULOCYTES # BLD AUTO: 0.27 10*3/MM3 (ref 0–0.05)
IMM GRANULOCYTES NFR BLD AUTO: 2.4 % (ref 0–0.5)
KETONES UR QL STRIP: ABNORMAL
LEUKOCYTE ESTERASE UR QL STRIP.AUTO: ABNORMAL
LYMPHOCYTES # BLD AUTO: 0.86 10*3/MM3 (ref 0.7–3.1)
LYMPHOCYTES NFR BLD AUTO: 7.6 % (ref 19.6–45.3)
M PNEUMO IGG SER IA-ACNC: NOT DETECTED
MCH RBC QN AUTO: 30.9 PG (ref 26.6–33)
MCHC RBC AUTO-ENTMCNC: 34 G/DL (ref 31.5–35.7)
MCV RBC AUTO: 91 FL (ref 79–97)
MONOCYTES # BLD AUTO: 1.67 10*3/MM3 (ref 0.1–0.9)
MONOCYTES NFR BLD AUTO: 14.7 % (ref 5–12)
MUCOUS THREADS URNS QL MICRO: PRESENT /HPF
NEUTROPHILS NFR BLD AUTO: 74.8 % (ref 42.7–76)
NEUTROPHILS NFR BLD AUTO: 8.53 10*3/MM3 (ref 1.7–7)
NITRITE UR QL STRIP: NEGATIVE
NRBC BLD AUTO-RTO: 0 /100 WBC (ref 0–0.2)
PH UR STRIP.AUTO: 6.5 [PH] (ref 5–8)
PLATELET # BLD AUTO: 294 10*3/MM3 (ref 140–450)
PMV BLD AUTO: 10.4 FL (ref 6–12)
POTASSIUM SERPL-SCNC: 3.1 MMOL/L (ref 3.5–5.2)
PROT SERPL-MCNC: 7.9 G/DL (ref 6–8.5)
PROT UR QL STRIP: ABNORMAL
QT INTERVAL: 347 MS
QTC INTERVAL: 456 MS
RBC # BLD AUTO: 4.88 10*6/MM3 (ref 3.77–5.28)
RBC # UR STRIP: ABNORMAL /HPF
REF LAB TEST METHOD: ABNORMAL
RHINOVIRUS RNA SPEC NAA+PROBE: NOT DETECTED
RSV RNA NPH QL NAA+NON-PROBE: NOT DETECTED
SARS-COV-2 RNA NPH QL NAA+NON-PROBE: NOT DETECTED
SODIUM SERPL-SCNC: 128 MMOL/L (ref 136–145)
SP GR UR STRIP: 1.02 (ref 1–1.03)
SQUAMOUS #/AREA URNS HPF: ABNORMAL /HPF
TROPONIN T % DELTA: -12
TROPONIN T NUMERIC DELTA: -2 NG/L
TROPONIN T SERPL HS-MCNC: 17 NG/L
UROBILINOGEN UR QL STRIP: ABNORMAL
WBC # UR STRIP: ABNORMAL /HPF
WBC NRBC COR # BLD AUTO: 11.38 10*3/MM3 (ref 3.4–10.8)

## 2025-02-24 PROCEDURE — 96361 HYDRATE IV INFUSION ADD-ON: CPT

## 2025-02-24 PROCEDURE — 84484 ASSAY OF TROPONIN QUANT: CPT | Performed by: EMERGENCY MEDICINE

## 2025-02-24 PROCEDURE — 0202U NFCT DS 22 TRGT SARS-COV-2: CPT | Performed by: EMERGENCY MEDICINE

## 2025-02-24 PROCEDURE — 99204 OFFICE O/P NEW MOD 45 MIN: CPT | Performed by: STUDENT IN AN ORGANIZED HEALTH CARE EDUCATION/TRAINING PROGRAM

## 2025-02-24 PROCEDURE — 99285 EMERGENCY DEPT VISIT HI MDM: CPT

## 2025-02-24 PROCEDURE — 96375 TX/PRO/DX INJ NEW DRUG ADDON: CPT

## 2025-02-24 PROCEDURE — 87040 BLOOD CULTURE FOR BACTERIA: CPT | Performed by: EMERGENCY MEDICINE

## 2025-02-24 PROCEDURE — G0378 HOSPITAL OBSERVATION PER HR: HCPCS

## 2025-02-24 PROCEDURE — 25010000002 SODIUM CHLORIDE 0.9 % WITH KCL 20 MEQ 20-0.9 MEQ/L-% SOLUTION: Performed by: INTERNAL MEDICINE

## 2025-02-24 PROCEDURE — 93005 ELECTROCARDIOGRAM TRACING: CPT | Performed by: EMERGENCY MEDICINE

## 2025-02-24 PROCEDURE — 93010 ELECTROCARDIOGRAM REPORT: CPT | Performed by: INTERNAL MEDICINE

## 2025-02-24 PROCEDURE — 96365 THER/PROPH/DIAG IV INF INIT: CPT

## 2025-02-24 PROCEDURE — 83605 ASSAY OF LACTIC ACID: CPT | Performed by: EMERGENCY MEDICINE

## 2025-02-24 PROCEDURE — 25810000003 SODIUM CHLORIDE 0.9 % SOLUTION: Performed by: EMERGENCY MEDICINE

## 2025-02-24 PROCEDURE — 36415 COLL VENOUS BLD VENIPUNCTURE: CPT | Performed by: EMERGENCY MEDICINE

## 2025-02-24 PROCEDURE — 76705 ECHO EXAM OF ABDOMEN: CPT

## 2025-02-24 PROCEDURE — 25010000002 CEFTRIAXONE PER 250 MG: Performed by: EMERGENCY MEDICINE

## 2025-02-24 PROCEDURE — 85025 COMPLETE CBC W/AUTO DIFF WBC: CPT | Performed by: EMERGENCY MEDICINE

## 2025-02-24 PROCEDURE — 81001 URINALYSIS AUTO W/SCOPE: CPT | Performed by: EMERGENCY MEDICINE

## 2025-02-24 PROCEDURE — 87086 URINE CULTURE/COLONY COUNT: CPT | Performed by: INTERNAL MEDICINE

## 2025-02-24 PROCEDURE — 71045 X-RAY EXAM CHEST 1 VIEW: CPT

## 2025-02-24 PROCEDURE — 25010000002 ONDANSETRON PER 1 MG: Performed by: EMERGENCY MEDICINE

## 2025-02-24 PROCEDURE — 84703 CHORIONIC GONADOTROPIN ASSAY: CPT | Performed by: EMERGENCY MEDICINE

## 2025-02-24 PROCEDURE — 80053 COMPREHEN METABOLIC PANEL: CPT | Performed by: EMERGENCY MEDICINE

## 2025-02-24 PROCEDURE — 25010000002 KETOROLAC TROMETHAMINE PER 15 MG: Performed by: EMERGENCY MEDICINE

## 2025-02-24 RX ORDER — ASPIRIN 81 MG/1
81 TABLET ORAL DAILY
Status: DISCONTINUED | OUTPATIENT
Start: 2025-02-24 | End: 2025-02-26 | Stop reason: HOSPADM

## 2025-02-24 RX ORDER — SODIUM CHLORIDE 0.9 % (FLUSH) 0.9 %
10 SYRINGE (ML) INJECTION AS NEEDED
Status: DISCONTINUED | OUTPATIENT
Start: 2025-02-24 | End: 2025-02-26 | Stop reason: HOSPADM

## 2025-02-24 RX ORDER — ZOLMITRIPTAN 5 MG/1
5 TABLET, FILM COATED ORAL
Status: DISCONTINUED | OUTPATIENT
Start: 2025-02-24 | End: 2025-02-26 | Stop reason: HOSPADM

## 2025-02-24 RX ORDER — ATORVASTATIN CALCIUM 10 MG/1
10 TABLET, FILM COATED ORAL NIGHTLY
Status: DISCONTINUED | OUTPATIENT
Start: 2025-02-24 | End: 2025-02-26 | Stop reason: HOSPADM

## 2025-02-24 RX ORDER — CETIRIZINE HYDROCHLORIDE 10 MG/1
10 TABLET ORAL DAILY
Status: DISCONTINUED | OUTPATIENT
Start: 2025-02-24 | End: 2025-02-26 | Stop reason: HOSPADM

## 2025-02-24 RX ORDER — SODIUM CHLORIDE AND POTASSIUM CHLORIDE 150; 900 MG/100ML; MG/100ML
125 INJECTION, SOLUTION INTRAVENOUS CONTINUOUS
Status: DISCONTINUED | OUTPATIENT
Start: 2025-02-24 | End: 2025-02-25

## 2025-02-24 RX ORDER — AMITRIPTYLINE HYDROCHLORIDE 50 MG/1
50 TABLET ORAL NIGHTLY
Status: DISCONTINUED | OUTPATIENT
Start: 2025-02-24 | End: 2025-02-26 | Stop reason: HOSPADM

## 2025-02-24 RX ORDER — ONDANSETRON 2 MG/ML
4 INJECTION INTRAMUSCULAR; INTRAVENOUS ONCE
Status: COMPLETED | OUTPATIENT
Start: 2025-02-24 | End: 2025-02-24

## 2025-02-24 RX ORDER — KETOROLAC TROMETHAMINE 15 MG/ML
15 INJECTION, SOLUTION INTRAMUSCULAR; INTRAVENOUS ONCE
Status: COMPLETED | OUTPATIENT
Start: 2025-02-24 | End: 2025-02-24

## 2025-02-24 RX ORDER — ONDANSETRON 2 MG/ML
4 INJECTION INTRAMUSCULAR; INTRAVENOUS EVERY 6 HOURS PRN
Status: DISCONTINUED | OUTPATIENT
Start: 2025-02-24 | End: 2025-02-26 | Stop reason: HOSPADM

## 2025-02-24 RX ORDER — SODIUM CHLORIDE 9 MG/ML
40 INJECTION, SOLUTION INTRAVENOUS AS NEEDED
Status: DISCONTINUED | OUTPATIENT
Start: 2025-02-24 | End: 2025-02-26 | Stop reason: HOSPADM

## 2025-02-24 RX ORDER — NITROGLYCERIN 0.4 MG/1
0.4 TABLET SUBLINGUAL
Status: DISCONTINUED | OUTPATIENT
Start: 2025-02-24 | End: 2025-02-26 | Stop reason: HOSPADM

## 2025-02-24 RX ORDER — TOPIRAMATE 100 MG/1
100 TABLET, FILM COATED ORAL 2 TIMES DAILY
Status: DISCONTINUED | OUTPATIENT
Start: 2025-02-24 | End: 2025-02-26 | Stop reason: HOSPADM

## 2025-02-24 RX ORDER — ASPIRIN 81 MG/1
81 TABLET ORAL DAILY
COMMUNITY

## 2025-02-24 RX ORDER — SODIUM CHLORIDE 0.9 % (FLUSH) 0.9 %
10 SYRINGE (ML) INJECTION EVERY 12 HOURS SCHEDULED
Status: DISCONTINUED | OUTPATIENT
Start: 2025-02-24 | End: 2025-02-26 | Stop reason: HOSPADM

## 2025-02-24 RX ADMIN — AMITRIPTYLINE HYDROCHLORIDE 50 MG: 50 TABLET, FILM COATED ORAL at 20:54

## 2025-02-24 RX ADMIN — ONDANSETRON 4 MG: 2 INJECTION, SOLUTION INTRAMUSCULAR; INTRAVENOUS at 09:42

## 2025-02-24 RX ADMIN — Medication 10 ML: at 20:55

## 2025-02-24 RX ADMIN — CEFTRIAXONE 2000 MG: 2 INJECTION, POWDER, FOR SOLUTION INTRAMUSCULAR; INTRAVENOUS at 15:05

## 2025-02-24 RX ADMIN — ATORVASTATIN CALCIUM 10 MG: 10 TABLET ORAL at 20:54

## 2025-02-24 RX ADMIN — TOPIRAMATE 100 MG: 100 TABLET, FILM COATED ORAL at 20:54

## 2025-02-24 RX ADMIN — Medication 2.5 MG: at 23:21

## 2025-02-24 RX ADMIN — SODIUM CHLORIDE 1000 ML: 9 INJECTION, SOLUTION INTRAVENOUS at 09:36

## 2025-02-24 RX ADMIN — POTASSIUM CHLORIDE AND SODIUM CHLORIDE 125 ML/HR: 900; 150 INJECTION, SOLUTION INTRAVENOUS at 18:57

## 2025-02-24 RX ADMIN — KETOROLAC TROMETHAMINE 15 MG: 15 INJECTION, SOLUTION INTRAMUSCULAR; INTRAVENOUS at 09:42

## 2025-02-24 NOTE — PROGRESS NOTES
Clinical Pharmacy Services: Medication History    Laura Cuenca is a 44 y.o. female presenting to [facility] for   Chief Complaint   Patient presents with    Weakness - Generalized    Nausea       She  has a past medical history of Allergic, Congenital deformity, Environmental allergies, Frontal mass of brain, Heartburn, High blood pressure, Hypothyroidism (2016?), Insomnia, Lumbar disc disorder, Meningioma (02/2021), Migraine, Thyroid disease, and Vision loss.    Allergies as of 02/24/2025 - Reviewed 02/24/2025   Allergen Reaction Noted    Egg white [egg white (egg protein)] Other (See Comments) 01/26/2021    Pollen extract Unknown - Low Severity 01/26/2021    Wheat Unknown - Low Severity 01/26/2021    Percocet [oxycodone-acetaminophen] Rash 01/26/2021       Medication information was obtained from: [facility]   Pharmacy and Phone Number:     Prior to Admission Medications       Prescriptions Last Dose Informant Patient Reported? Taking?    amitriptyline (ELAVIL) 25 MG tablet  Pharmacy No Yes    Take 2 tablets by mouth Every Night.    aspirin 81 MG EC tablet  Self Yes Yes    Take 1 tablet by mouth Daily.    Atogepant 60 MG tablet  Pharmacy No Yes    Take 1 tablet by mouth Daily.    atorvastatin (LIPITOR) 10 MG tablet  Pharmacy No Yes    Take 1 tablet by mouth Every Night.    fexofenadine (ALLEGRA) 180 MG tablet  Self Yes Yes    Take 1 tablet by mouth Daily.    Ibuprofen 3 %, Gabapentin 10 %, Baclofen 2 %, lidocaine 4 %  Pharmacy No Yes    Apply 1-2 g topically to the appropriate area as directed 3 (Three) to 4 (Four) times daily.    levothyroxine (SYNTHROID, LEVOTHROID) 137 MCG tablet  Pharmacy No Yes    Take 1 tablet by mouth Every Morning.    Patient taking differently:  Take 1 tablet by mouth Every Morning.    topiramate (Topamax) 100 MG tablet  Pharmacy No Yes    Take 1 tablet by mouth 2 (Two) Times a Day.    ZOLMitriptan (ZOMIG-ZMT) 5 MG disintegrating tablet  Pharmacy No Yes    Take one tablet  by mouth as needed for migraine. May repeat in 2 hours if needed. Do not exceed 2 tablets in 24 hours.    Patient taking differently:  Place 1 tablet on the tongue 1 (One) Time As Needed for Migraine. Take one tablet by mouth as needed for migraine. May repeat in 2 hours if needed. Do not exceed 2 tablets in 24 hours.              Medication notes:     This medication list is complete to the best of my knowledge as of 2/24/2025    Please call if questions.    Faviola Cabello  Medication History Technician   [phone]    2/24/2025 13:34 EST

## 2025-02-24 NOTE — ED NOTES
"Nursing report ED to floor  Laura Cuenca  44 y.o.  female    HPI :  HPI  Stated Reason for Visit: Pt c/o nausea and generalized weakness for five days.  History Obtained From: patient    Chief Complaint  Chief Complaint   Patient presents with    Weakness - Generalized    Nausea       Admitting doctor:   Braeden Salamanca MD    Admitting diagnosis:   The primary encounter diagnosis was Calculus of gallbladder without cholecystitis without obstruction. Diagnoses of Nausea and vomiting, unspecified vomiting type, Generalized weakness, Hyponatremia, Hypokalemia, and Pneumonia of left lower lobe due to infectious organism were also pertinent to this visit.    Code status:   Current Code Status       Date Active Code Status Order ID Comments User Context       Prior            Allergies:   Egg white [egg white (egg protein)], Pollen extract, Wheat, and Percocet [oxycodone-acetaminophen]    Isolation:   No active isolations    Intake and Output    Intake/Output Summary (Last 24 hours) at 2/24/2025 1446  Last data filed at 2/24/2025 1133  Gross per 24 hour   Intake 1000 ml   Output --   Net 1000 ml       Weight:       02/24/25  0922   Weight: 61.7 kg (136 lb)       Most recent vitals:   Vitals:    02/24/25 0905 02/24/25 0922 02/24/25 0941 02/24/25 1141   BP:   153/95 (!) 126/101   Pulse: (!) 130  104 94   Resp: 18   18   Temp: 98.4 °F (36.9 °C)      TempSrc: Tympanic      SpO2: 97%  95% 96%   Weight:  61.7 kg (136 lb)     Height:  152.4 cm (60\")         Active LDAs/IV Access:   Lines, Drains & Airways       Active LDAs       Name Placement date Placement time Site Days    Peripheral IV 02/24/25 0936 Left Antecubital 02/24/25  0936  Antecubital  less than 1                    Labs (abnormal labs have a star):   Labs Reviewed   COMPREHENSIVE METABOLIC PANEL - Abnormal; Notable for the following components:       Result Value    Glucose 129 (*)     Sodium 128 (*)     Potassium 3.1 (*)     Chloride 95 (*)     CO2 21.0 (*)     ALT " (SGPT) 165 (*)     AST (SGOT) 210 (*)     Alkaline Phosphatase 175 (*)     All other components within normal limits    Narrative:     GFR Categories in Chronic Kidney Disease (CKD)      GFR Category          GFR (mL/min/1.73)    Interpretation  G1                     90 or greater         Normal or high (1)  G2                      60-89                Mild decrease (1)  G3a                   45-59                Mild to moderate decrease  G3b                   30-44                Moderate to severe decrease  G4                    15-29                Severe decrease  G5                    14 or less           Kidney failure          (1)In the absence of evidence of kidney disease, neither GFR category G1 or G2 fulfill the criteria for CKD.    eGFR calculation 2021 CKD-EPI creatinine equation, which does not include race as a factor   URINALYSIS W/ MICROSCOPIC IF INDICATED (NO CULTURE) - Abnormal; Notable for the following components:    Appearance, UA Cloudy (*)     Ketones, UA 80 mg/dL (3+) (*)     Blood, UA Moderate (2+) (*)     Protein,  mg/dL (2+) (*)     Leuk Esterase, UA Small (1+) (*)     All other components within normal limits   TROPONIN - Abnormal; Notable for the following components:    HS Troponin T 17 (*)     All other components within normal limits    Narrative:     High Sensitive Troponin T Reference Range:  <14.0 ng/L- Negative Female for AMI  <22.0 ng/L- Negative Male for AMI  >=14 - Abnormal Female indicating possible myocardial injury.  >=22 - Abnormal Male indicating possible myocardial injury.   Clinicians would have to utilize clinical acumen, EKG, Troponin, and serial changes to determine if it is an Acute Myocardial Infarction or myocardial injury due to an underlying chronic condition.        CBC WITH AUTO DIFFERENTIAL - Abnormal; Notable for the following components:    WBC 11.38 (*)     Lymphocyte % 7.6 (*)     Monocyte % 14.7 (*)     Eosinophil % 0.1 (*)     Immature Grans %  2.4 (*)     Neutrophils, Absolute 8.53 (*)     Monocytes, Absolute 1.67 (*)     Immature Grans, Absolute 0.27 (*)     All other components within normal limits   HIGH SENSITIVITIY TROPONIN T 1HR - Abnormal; Notable for the following components:    HS Troponin T 15 (*)     All other components within normal limits    Narrative:     High Sensitive Troponin T Reference Range:  <14.0 ng/L- Negative Female for AMI  <22.0 ng/L- Negative Male for AMI  >=14 - Abnormal Female indicating possible myocardial injury.  >=22 - Abnormal Male indicating possible myocardial injury.   Clinicians would have to utilize clinical acumen, EKG, Troponin, and serial changes to determine if it is an Acute Myocardial Infarction or myocardial injury due to an underlying chronic condition.        URINALYSIS, MICROSCOPIC ONLY - Abnormal; Notable for the following components:    RBC, UA 6-10 (*)     WBC, UA 21-50 (*)     Bacteria, UA 2+ (*)     Squamous Epithelial Cells, UA 3-6 (*)     Mucus, UA Present (*)     All other components within normal limits   RESPIRATORY PANEL PCR W/ COVID-19 (SARS-COV-2), NP SWAB IN UTM/VTP, 2 HR TAT - Normal    Narrative:     In the setting of a positive respiratory panel with a viral infection PLUS a negative procalcitonin without other underlying concern for bacterial infection, consider observing off antibiotics or discontinuation of antibiotics and continue supportive care. If the respiratory panel is positive for atypical bacterial infection (Bordetella pertussis, Chlamydophila pneumoniae, or Mycoplasma pneumoniae), consider antibiotic de-escalation to target atypical bacterial infection.   HCG, SERUM, QUALITATIVE - Normal   LACTIC ACID, PLASMA - Normal   BLOOD CULTURE   BLOOD CULTURE   CBC AND DIFFERENTIAL    Narrative:     The following orders were created for panel order CBC & Differential.  Procedure                               Abnormality         Status                     ---------                                -----------         ------                     CBC Auto Differential[[id number]]        Abnormal            Final result                 Please view results for these tests on the individual orders.       EKG:   ECG 12 Lead Other; weakness   Preliminary Result   HEART PAKF=119  bpm   RR Kyhrbqwn=698  ms   UT Zskjzofw=143  ms   P Horizontal Axis=32  deg   P Front Axis=40  deg   QRSD Interval=96  ms   QT Vupwskzx=509  ms   LGiF=950  ms   QRS Axis=-12  deg   T Wave Axis=27  deg   - OTHERWISE NORMAL ECG -   Sinus tachycardia   Borderline low voltage, extremity leads   Date and Time of Study:2025 09:52:04          Meds given in ED:   Medications   cefTRIAXone (ROCEPHIN) 2,000 mg in sodium chloride 0.9 % 100 mL MBP (has no administration in time range)   sodium chloride 0.9 % bolus 1,000 mL (0 mL Intravenous Stopped 25 1133)   ondansetron (ZOFRAN) injection 4 mg (4 mg Intravenous Given 25 0942)   ketorolac (TORADOL) injection 15 mg (15 mg Intravenous Given 25 09)       Imaging results:  US Gallbladder    Result Date: 2025  Wall echo shadow complex indicating gallbladder full of gallstones. No wall thickening or reported sonographic Gunter sign.  This report was finalized on 2025 1:18 PM by Dr. Rod Madrid M.D on Workstation: [employer]      XR Chest 1 View    Result Date: 2025   1. Left lower lung opacities concerning for pneumonia. Recommend follow-up to resolution. 2. Potential small left pleural effusion.  This report was finalized on 2025 10:04 AM by Dr. Rod Madrid M.D on Workstation: [employer]       Ambulatory status:   - assist     Social issues:   Social History     Socioeconomic History    Marital status: Single   Tobacco Use    Smoking status: Former     Current packs/day: 0.00     Average packs/day: 0.5 packs/day for 20.0 years (10.0 ttl pk-yrs)     Types: Cigarettes     Start date: 2016     Quit date: 2021     Years since quittin.0     Smokeless tobacco: Never   Vaping Use    Vaping status: Some Days    Substances: THC    Devices: Disposable   Substance and Sexual Activity    Alcohol use: Yes     Comment: Maybe one or two drinks every 6 weeks.    Drug use: Yes     Types: Marijuana     Comment: WILL STOP TILL SURGERY COMPLETED. VAPES    Sexual activity: Yes     Partners: Male     Birth control/protection: None       Peripheral Neurovascular  Peripheral Neurovascular (Adult)  Peripheral Neurovascular WDL: WDL    Neuro Cognitive  Neuro Cognitive (Adult)  Cognitive/Neuro/Behavioral WDL: WDL    Learning  Learning Assessment  Learning Readiness and Ability: no barriers identified    Respiratory  Respiratory  Airway WDL: WDL  Respiratory WDL  Respiratory WDL: WDL    Abdominal Pain       Pain Assessments  Pain (Adult)  (0-10) Pain Rating: Rest: 3  (0-10) Pain Rating: Activity: 3  Patient requested Medication Prescribed for Lower Pain Scale: No  Pain Location: abdomen  Response to Pain Interventions: interventions effective per patient    NIH Stroke Scale       Vaishali Strickland RN  02/24/25 14:46 EST

## 2025-02-24 NOTE — CONSULTS
General Surgery Consultation    Consulting Physician: Donna Beck MD  Referring: Rafa Castillo MD    Reason for consultation:   cholecystitis    CC:   Back pain, nausea, fatigue    HPI:   The patient is a 44 y.o. female with brain cancer who presented to the hospital with 5-day history of fatigue, nausea, vomiting, a couple of episodes of diarrhea, along with fevers and chills.  She has felt very weak.  She is also been complaining of right upper back pain.  She denies any abdominal pain.  She has not been able to take in much food since last week but has been trying to drink water.  She has never had any similar symptoms before.  She presented to the emergency department where she was noted to have evidence of dehydration, mild leukocytosis, and elevated transaminases and alkaline phosphatase, along with ultrasound demonstrating gallstones.     She states her only abdominal surgery was fat grafting for her prior craniotomy.  She is on a baby aspirin for what she reports is concern for intracranial stenosis of a small vein.     Past Medical History:  Brain tumor  Heartburn  Hypertension  Hypothyroidism  Insomnia  Lumbar disc disease  Migraines  Vision loss    Past Surgical History:  Past Surgical History:   Procedure Laterality Date    BRAIN SURGERY  2/26/2021    Craniotomy    CRANIOTOMY FOR TUMOR N/A 02/26/2021    Procedure: BIFRONTAL CRANIOTOMY FOR TUMOR STEREOTACTIC, WITH FAT GRAFTING;  Surgeon: Gume Carver MD;  Location: [facility];  Service: Neurosurgery;  Laterality: N/A;    US GUIDED CYST ASPIRATION BREAST N/A 4/1/2024    WISDOM TOOTH EXTRACTION       Medications:  Medications Prior to Admission   Medication Sig Dispense Refill Last Dose/Taking    amitriptyline (ELAVIL) 25 MG tablet Take 2 tablets by mouth Every Night. 180 tablet 3 Taking    aspirin 81 MG EC tablet Take 1 tablet by mouth Daily.   Taking    Atogepant 60 MG tablet Take 1 tablet by mouth Daily. 30 tablet 2 Taking     atorvastatin (LIPITOR) 10 MG tablet Take 1 tablet by mouth Every Night. 90 tablet 3 Taking    fexofenadine (ALLEGRA) 180 MG tablet Take 1 tablet by mouth Daily.   Taking    Ibuprofen 3 %, Gabapentin 10 %, Baclofen 2 %, lidocaine 4 % Apply 1-2 g topically to the appropriate area as directed 3 (Three) to 4 (Four) times daily. 90 g 0 Taking    levothyroxine (SYNTHROID, LEVOTHROID) 137 MCG tablet Take 1 tablet by mouth Every Morning. (Patient taking differently: Take 1 tablet by mouth Every Morning.) 90 tablet 0 Taking Differently    topiramate (Topamax) 100 MG tablet Take 1 tablet by mouth 2 (Two) Times a Day. 60 tablet 2 Taking    ZOLMitriptan (ZOMIG-ZMT) 5 MG disintegrating tablet Take one tablet by mouth as needed for migraine. May repeat in 2 hours if needed. Do not exceed 2 tablets in 24 hours. (Patient taking differently: Place 1 tablet on the tongue 1 (One) Time As Needed for Migraine. Take one tablet by mouth as needed for migraine. May repeat in 2 hours if needed. Do not exceed 2 tablets in 24 hours.) 9 tablet 2 Taking Differently       Current Facility-Administered Medications:     amitriptyline (ELAVIL) tablet 50 mg, 50 mg, Oral, Nightly, Braeden Salamanca MD    aspirin EC tablet 81 mg, 81 mg, Oral, Daily, Braeden Salamanca MD    Atogepant tablet 60 mg, 60 mg, Oral, Daily, Braeden Salamanca MD    atorvastatin (LIPITOR) tablet 10 mg, 10 mg, Oral, Nightly, Braeden Salamanca MD    Calcium Replacement - Follow Nurse / BPA Driven Protocol, , Not Applicable, PRN, Braeden Salamanca MD    [START ON 2/25/2025] cefTRIAXone (ROCEPHIN) 2,000 mg in sodium chloride 0.9 % 100 mL MBP, 2,000 mg, Intravenous, Q24H, Braeden Salamanca MD    cetirizine (zyrTEC) tablet 10 mg, 10 mg, Oral, Daily, Braeden Salamanca MD    [START ON 2/25/2025] levothyroxine (SYNTHROID, LEVOTHROID) tablet 137 mcg, 137 mcg, Oral, Q AM, Braeden Salamanca MD    Magnesium Standard Dose Replacement - Follow Nurse / BPA Driven Protocol, , Not Applicable, MIKO, Braeden Salamanca MD     nitroglycerin (NITROSTAT) SL tablet 0.4 mg, 0.4 mg, Sublingual, Q5 Min PRN, Braeden Salamanca MD    ondansetron (ZOFRAN) injection 4 mg, 4 mg, Intravenous, Q6H PRN, Braeden Salamanca MD    Phosphorus Replacement - Follow Nurse / BPA Driven Protocol, , Not Applicable, PRN, Braeden Salamanca MD    Potassium Replacement - Follow Nurse / BPA Driven Protocol, , Not Applicable, PRN, Braeden Salamanca MD    sodium chloride 0.9 % flush 10 mL, 10 mL, Intravenous, Q12H, Braeden Salamanca MD    sodium chloride 0.9 % flush 10 mL, 10 mL, Intravenous, PRN, Braeden Salamanca MD    sodium chloride 0.9 % infusion 40 mL, 40 mL, Intravenous, PRN, Braeden Salamanca MD    sodium chloride 0.9 % with KCl 20 mEq/L infusion, 125 mL/hr, Intravenous, Continuous, Braeden Salamanca MD    topiramate (TOPAMAX) tablet 100 mg, 100 mg, Oral, BID, Braeden Slaamanca MD    ZOLMitriptan (ZOMIG) tablet 5 mg, 5 mg, Oral, Q2H PRN, Braeden Salamanca MD    Allergies:   Allergies   Allergen Reactions    Egg White [Egg White (Egg Protein)] Other (See Comments)     GIVES HER GAS    Pollen Extract Unknown - Low Severity    Wheat Unknown - Low Severity    Percocet [Oxycodone-Acetaminophen] Rash       Social History:   Smokes tobacco  Smokes marijuana  Denies alcohol use  Boyfriend is with her today at bedside.    Family History:   Two uncles had cholecystectomy for benign disease  Denies other family history of HPB disease    Review of Systems:  Constitutional: +fatigue, weakness, fevers, chills  Eyes: denies scleral icterus  Cardiovascular: denies chest pain or palpitations   Respiratory: denies cough or shortness of breath  Gastrointestinal:  denies abdominal pain, + nausea, + vomiting, +diarrhea, denies constipation, melena, hematochezia  Genitourinary: denies dysuria or hematuria  Neurologic: + headache, denies weakness, or numbness  Skin: denies rash or jaundice     Physical Exam:   Vitals:    02/24/25 1635   BP: 123/88   Pulse: 105   Resp: 18   Temp: 98.1 °F (36.7 °C)   SpO2: 98%     GENERAL:  alert, interactive, cooperative  HEENT: no scleral icterus; moist mucous membranes; red staining of teeth  NECK: Supple  RESPIRATORY: normal work of breathing on room air  CARDIOVASCULAR: HR 100s, normotensive  GASTROINTESTINAL: abdomen soft, moderately tender in the right mid abdomen, no guarding, negative velázquez's sign, small healed transverse incision in the left upper quadrant  MUSCULOSKELETAL: no cyanosis or edema   NEUROLOGIC: alert and oriented, normal speech, no gross focal deficits   SKIN: warm, no rash, no jaundice      Diagnostic workup:     Pertinent labs:   Results from last 7 days   Lab Units 02/24/25  0936   WBC 10*3/mm3 11.38*   HEMOGLOBIN g/dL 15.1   HEMATOCRIT % 44.4   PLATELETS 10*3/mm3 294     Results from last 7 days   Lab Units 02/24/25  0936   SODIUM mmol/L 128*   POTASSIUM mmol/L 3.1*   CHLORIDE mmol/L 95*   CO2 mmol/L 21.0*   BUN mg/dL 12   CREATININE mg/dL 0.72   CALCIUM mg/dL 9.6   BILIRUBIN mg/dL 0.3   ALK PHOS U/L 175*   ALT (SGPT) U/L 165*   AST (SGOT) U/L 210*   GLUCOSE mg/dL 129*     UA reviewed - appears contaminated, +ketones, protein, blood   Lactate 1.1    Imaging:  Gallbladder ultrasound 2/24/2025 images and report reviewed-the gallbladder is full of gallstones without obvious wall thickening, common bile duct measures 4 mm    Chest x-ray report reviewed-left lower lung opacities concerning for pneumonia, potential small left pleural effusion    Assessment and plan:   Acute cholecystitis  Cholelithiasis  Elevated transaminases and alkaline phosphatase  Hyponatremia  Hypokalemia    Patient is a 44-year-old lady who presents with 5-day history of fatigue, nausea and vomiting, weakness, and back pain, found on workup to have cholelithiasis with concern for cholecystitis given her persistent pain and elevated LFTs.     I discussed with the patient my recommendation for laparoscopic possible open cholecystectomy with intraoperative cholangiogram, including the nature of the procedure,  the risks (including but not limited to bleeding, infection, injury to surrounding structures, risk for conversion to open procedure, risk of injury to the biliary tree or postoperative bile leak, changes in bowel function accompanying cholecystectomy, risks with anesthesia), benefits, alternatives, and postoperative expectations including recommendations for postop diet, wound care, activity restrictions, and pain control.  All questions were answered.  Informed consent was obtained.  The patient wishes to proceed with surgery.  N.p.o. after midnight in anticipation for possible surgery tomorrow pending or availability.  Continue hydration and antibiotics.      Donna Beck M.D.  General, Robotic, and Endoscopic Surgery  [facility]    [address], [address]  [place], [address]  P: [phone]  F: [phone]

## 2025-02-24 NOTE — ED PROVIDER NOTES
EMERGENCY DEPARTMENT ENCOUNTER    Room Number:  16/16  PCP: Georgiana Hammonds MD  Historian: Patient      HPI:  Chief Complaint: Weakness  A complete HPI/ROS/PMH/PSH/SH/FH are unobtainable due to: None  Context: Laura Cuenca is a 44 y.o. female who presents to the ED c/o sudden onset of generalized weakness that has been present and significantly worsening now over the last several days.  She reports associated nausea as well as loss of appetite associated with the symptoms.  She states that the symptoms are moderate if not severe in intensity at this time.  She was seen in urgent care just prior to coming today and referred here to the ED for potential dehydration evaluation and treatment.  She currently denies chest pain, abdominal pain, back pain, fever/chills, or known sick contacts.            PAST MEDICAL HISTORY  Active Ambulatory Problems     Diagnosis Date Noted    Dermoid cyst of brain 02/11/2021    Intractable chronic migraine without aura 05/03/2022    Encounter for health maintenance examination in adult 02/02/2024     Resolved Ambulatory Problems     Diagnosis Date Noted    No Resolved Ambulatory Problems     Past Medical History:   Diagnosis Date    Allergic     Congenital deformity     Environmental allergies     Frontal mass of brain     Heartburn     High blood pressure     Hypothyroidism 2016?    Insomnia     Lumbar disc disorder     Meningioma 02/2021    Migraine     Thyroid disease     Vision loss          PAST SURGICAL HISTORY  Past Surgical History:   Procedure Laterality Date    BRAIN SURGERY  2/26/2021    Craniotomy    CRANIOTOMY FOR TUMOR N/A 02/26/2021    Procedure: BIFRONTAL CRANIOTOMY FOR TUMOR STEREOTACTIC, WITH FAT GRAFTING;  Surgeon: Gume Carver MD;  Location: [facility];  Service: Neurosurgery;  Laterality: N/A;    US GUIDED CYST ASPIRATION BREAST N/A 4/1/2024    WISDOM TOOTH EXTRACTION           FAMILY HISTORY  Family History   Problem Relation Age of Onset    Heart  disease Father     Hypertension Father     Heart disease Mother     Hypertension Mother     Cancer Mother     Diabetes Mother     Endometrial cancer Mother     Early death Mother     Hyperlipidemia Mother     Vision loss Mother     Endometrial cancer Brother     Uterine cancer Maternal Grandmother     Malyvonne Hyperthermia Neg Hx     Breast cancer Neg Hx     Ovarian cancer Neg Hx     Colon cancer Neg Hx          SOCIAL HISTORY  Social History     Socioeconomic History    Marital status: Single   Tobacco Use    Smoking status: Former     Current packs/day: 0.00     Average packs/day: 0.5 packs/day for 20.0 years (10.0 ttl pk-yrs)     Types: Cigarettes     Start date: 2016     Quit date: 2021     Years since quittin.0    Smokeless tobacco: Never   Vaping Use    Vaping status: Some Days    Substances: THC    Devices: Disposable   Substance and Sexual Activity    Alcohol use: Yes     Comment: Maybe one or two drinks every 6 weeks.    Drug use: Yes     Types: Marijuana     Comment: WILL STOP TILL SURGERY COMPLETED. VAPES    Sexual activity: Yes     Partners: Male     Birth control/protection: None         ALLERGIES  Egg white [egg white (egg protein)], Pollen extract, Wheat, and Percocet [oxycodone-acetaminophen]        REVIEW OF SYSTEMS  Review of Systems   Constitutional:  Negative for fever.   HENT:  Negative for sore throat.    Eyes: Negative.    Respiratory:  Negative for cough and shortness of breath.    Cardiovascular:  Negative for chest pain.   Gastrointestinal:  Positive for nausea. Negative for abdominal pain and vomiting.   Genitourinary:  Negative for dysuria.   Musculoskeletal:  Negative for neck pain.   Skin:  Negative for rash.   Allergic/Immunologic: Negative.    Neurological:  Positive for weakness. Negative for numbness and headaches.   Hematological: Negative.    Psychiatric/Behavioral: Negative.     All other systems reviewed and are negative.         PHYSICAL EXAM  ED Triage Vitals   Temp  Heart Rate Resp BP SpO2   02/24/25 0905 02/24/25 0905 02/24/25 0905 02/24/25 0941 02/24/25 0905   98.4 °F (36.9 °C) (!) 130 18 153/95 97 %      Temp src Heart Rate Source Patient Position BP Location FiO2 (%)   02/24/25 0905 -- -- -- --   Tympanic           Physical Exam  Constitutional:       General: She is not in acute distress.     Appearance: Normal appearance. She is not ill-appearing or toxic-appearing.   HENT:      Head: Normocephalic and atraumatic.   Eyes:      Extraocular Movements: Extraocular movements intact.      Pupils: Pupils are equal, round, and reactive to light.   Cardiovascular:      Rate and Rhythm: Regular rhythm. Tachycardia present.      Heart sounds: No murmur heard.     No friction rub. No gallop.   Pulmonary:      Effort: Pulmonary effort is normal.      Breath sounds: Normal breath sounds.   Abdominal:      General: Abdomen is flat. There is no distension.      Palpations: Abdomen is soft.      Tenderness: There is no abdominal tenderness.   Musculoskeletal:         General: No swelling or tenderness. Normal range of motion.      Cervical back: Normal range of motion and neck supple.   Skin:     General: Skin is warm and dry.   Neurological:      General: No focal deficit present.      Mental Status: She is alert and oriented to person, place, and time.      Sensory: No sensory deficit.      Motor: No weakness.   Psychiatric:         Mood and Affect: Mood normal.         Behavior: Behavior normal.           Vital signs and nursing notes reviewed.          LAB RESULTS  Recent Results (from the past 24 hours)   Covid-19 + Flu A&B AG, Veritor    Collection Time: 02/24/25  8:42 AM    Specimen: Swab   Result Value Ref Range    SARS Antigen Not Detected Not Detected, Presumptive Negative    Influenza A Antigen ALVINO Not Detected Not Detected    Influenza B Antigen ALVINO Not Detected Not Detected    Internal Control Passed Passed    Lot Number [id number]     Expiration Date 11/21/2025     Comprehensive Metabolic Panel    Collection Time: 02/24/25  9:36 AM    Specimen: Blood   Result Value Ref Range    Glucose 129 (H) 65 - 99 mg/dL    BUN 12 6 - 20 mg/dL    Creatinine 0.72 0.57 - 1.00 mg/dL    Sodium 128 (L) 136 - 145 mmol/L    Potassium 3.1 (L) 3.5 - 5.2 mmol/L    Chloride 95 (L) 98 - 107 mmol/L    CO2 21.0 (L) 22.0 - 29.0 mmol/L    Calcium 9.6 8.6 - 10.5 mg/dL    Total Protein 7.9 6.0 - 8.5 g/dL    Albumin 3.5 3.5 - 5.2 g/dL    ALT (SGPT) 165 (H) 1 - 33 U/L    AST (SGOT) 210 (H) 1 - 32 U/L    Alkaline Phosphatase 175 (H) 39 - 117 U/L    Total Bilirubin 0.3 0.0 - 1.2 mg/dL    Globulin 4.4 gm/dL    A/G Ratio 0.8 g/dL    BUN/Creatinine Ratio 16.7 7.0 - 25.0    Anion Gap 12.0 5.0 - 15.0 mmol/L    eGFR 106.5 >60.0 mL/min/1.73   Respiratory Panel PCR w/COVID-19(SARS-CoV-2) KEYANA/MYA/GIOVANNI/PAD/COR/EDUARDO In-House, NP Swab in [facility], 2 HR TAT - Swab, Nasopharynx    Collection Time: 02/24/25  9:36 AM    Specimen: Nasopharynx; Swab   Result Value Ref Range    ADENOVIRUS, PCR Not Detected Not Detected    Coronavirus 229E Not Detected Not Detected    Coronavirus HKU1 Not Detected Not Detected    Coronavirus NL63 Not Detected Not Detected    Coronavirus OC43 Not Detected Not Detected    COVID19 Not Detected Not Detected - Ref. Range    Human Metapneumovirus Not Detected Not Detected    Human Rhinovirus/Enterovirus Not Detected Not Detected    Influenza A PCR Not Detected Not Detected    Influenza B PCR Not Detected Not Detected    Parainfluenza Virus 1 Not Detected Not Detected    Parainfluenza Virus 2 Not Detected Not Detected    Parainfluenza Virus 3 Not Detected Not Detected    Parainfluenza Virus 4 Not Detected Not Detected    RSV, PCR Not Detected Not Detected    Bordetella pertussis pcr Not Detected Not Detected    Bordetella parapertussis PCR Not Detected Not Detected    Chlamydophila pneumoniae PCR Not Detected Not Detected    Mycoplasma pneumo by PCR Not Detected Not Detected   hCG, Serum, Qualitative     Collection Time: 02/24/25  9:36 AM    Specimen: Blood   Result Value Ref Range    HCG Qualitative Negative Negative   Lactic Acid, Plasma    Collection Time: 02/24/25  9:36 AM    Specimen: Blood   Result Value Ref Range    Lactate 1.1 0.5 - 2.0 mmol/L   High Sensitivity Troponin T    Collection Time: 02/24/25  9:36 AM    Specimen: Blood   Result Value Ref Range    HS Troponin T 17 (H) <14 ng/L   CBC Auto Differential    Collection Time: 02/24/25  9:36 AM    Specimen: Blood   Result Value Ref Range    WBC 11.38 (H) 3.40 - 10.80 10*3/mm3    RBC 4.88 3.77 - 5.28 10*6/mm3    Hemoglobin 15.1 12.0 - 15.9 g/dL    Hematocrit 44.4 34.0 - 46.6 %    MCV 91.0 79.0 - 97.0 fL    MCH 30.9 26.6 - 33.0 pg    MCHC 34.0 31.5 - 35.7 g/dL    RDW 12.7 12.3 - 15.4 %    RDW-SD 42.5 37.0 - 54.0 fl    MPV 10.4 6.0 - 12.0 fL    Platelets 294 140 - 450 10*3/mm3    Neutrophil % 74.8 42.7 - 76.0 %    Lymphocyte % 7.6 (L) 19.6 - 45.3 %    Monocyte % 14.7 (H) 5.0 - 12.0 %    Eosinophil % 0.1 (L) 0.3 - 6.2 %    Basophil % 0.4 0.0 - 1.5 %    Immature Grans % 2.4 (H) 0.0 - 0.5 %    Neutrophils, Absolute 8.53 (H) 1.70 - 7.00 10*3/mm3    Lymphocytes, Absolute 0.86 0.70 - 3.10 10*3/mm3    Monocytes, Absolute 1.67 (H) 0.10 - 0.90 10*3/mm3    Eosinophils, Absolute 0.01 0.00 - 0.40 10*3/mm3    Basophils, Absolute 0.04 0.00 - 0.20 10*3/mm3    Immature Grans, Absolute 0.27 (H) 0.00 - 0.05 10*3/mm3    nRBC 0.0 0.0 - 0.2 /100 WBC   ECG 12 Lead Other; weakness    Collection Time: 02/24/25  9:52 AM   Result Value Ref Range    QT Interval 347 ms    QTC Interval 456 ms   Urinalysis With Microscopic If Indicated (No Culture) - Urine, Clean Catch    Collection Time: 02/24/25 10:09 AM    Specimen: Urine, Clean Catch   Result Value Ref Range    Color, UA Yellow Yellow, Straw    Appearance, UA Cloudy (A) Clear    pH, UA 6.5 5.0 - 8.0    Specific Gravity, UA 1.019 1.005 - 1.030    Glucose, UA Negative Negative    Ketones, UA 80 mg/dL (3+) (A) Negative    Bilirubin, UA  Negative Negative    Blood, UA Moderate (2+) (A) Negative    Protein,  mg/dL (2+) (A) Negative    Leuk Esterase, UA Small (1+) (A) Negative    Nitrite, UA Negative Negative    Urobilinogen, UA 1.0 E.U./dL 0.2 - 1.0 E.U./dL   Urinalysis, Microscopic Only - Urine, Clean Catch    Collection Time: 02/24/25 10:09 AM    Specimen: Urine, Clean Catch   Result Value Ref Range    RBC, UA 6-10 (A) None Seen, 0-2 /HPF    WBC, UA 21-50 (A) None Seen, 0-2 /HPF    Bacteria, UA 2+ (A) None Seen /HPF    Squamous Epithelial Cells, UA 3-6 (A) None Seen, 0-2 /HPF    Hyaline Casts, UA 3-6 None Seen /LPF    Mucus, UA Present (A) None Seen /HPF    Methodology Automated Microscopy    High Sensitivity Troponin T 1Hr    Collection Time: 02/24/25 10:51 AM    Specimen: Arm, Right; Blood   Result Value Ref Range    HS Troponin T 15 (H) <14 ng/L    Troponin T Numeric Delta -2 ng/L    Troponin T % Delta -12 Abnormal if >/= 20%       Ordered the above labs and reviewed the results.        RADIOLOGY  US Gallbladder    Result Date: 2/24/2025  US GALLBLADDER-  INDICATION: Transaminitis  COMPARISON: None  TECHNIQUE: Grayscale and color Doppler evaluation of the right upper quadrant with spectral Doppler evaluation of the main portal vein  FINDINGS:  Pancreas: Pancreatic head is unremarkable. Pancreatic neck, body and tail are obscured by bowel gas artifact.  Liver: Normal echotexture and echogenicity. No liver mass seen. Main portal vein is patent with normal blood flow direction and waveform.  Gallbladder: Wall echo shadow complex indicating gallbladder full of gallstones. No wall thickening. Negative reported sonographic Gunter sign.  Biliary system: No biliary ductal dilatation. Common bile duct measures 4 mm.  Right kidney: Measures 10.9 x 5.4 x 4.2 cm. No renal mass seen. No hydronephrosis.      Wall echo shadow complex indicating gallbladder full of gallstones. No wall thickening or reported sonographic Gunter sign.  This report was  finalized on 2/24/2025 1:18 PM by Dr. Rod Madrid M.D on Workstation: [id number]      XR Chest 1 View    Result Date: 2/24/2025  XR CHEST 1 VW-   INDICATION: Body aches  COMPARISON: None  TECHNIQUE: 1 view chest  FINDINGS:  Ill-defined left lower lung opacity, with obscuration of the left diaphragm silhouette. Blunting left costophrenic angle. Normal mediastinal contour. Heart is normal in size. Mild reversed S-shaped curvature to the thoracic spine.       1. Left lower lung opacities concerning for pneumonia. Recommend follow-up to resolution. 2. Potential small left pleural effusion.  This report was finalized on 2/24/2025 10:04 AM by Dr. Rod Madrid M.D on Workstation: [employer]       Ordered the above noted radiological studies. Reviewed by me in PACS.            PROCEDURES  Procedures    EKG independently interpreted by myself as follows:    EKG          EKG time: 0952  Rhythm/Rate: NSR, 103  P waves and MT: nml  QRS, axis: nml, nml   ST and T waves: nml     Interpreted Contemporaneously by me, independently viewed  changed compared to prior 2/24/21            MEDICATIONS GIVEN IN ER  Medications   cefTRIAXone (ROCEPHIN) 2,000 mg in sodium chloride 0.9 % 100 mL MBP (has no administration in time range)   sodium chloride 0.9 % bolus 1,000 mL (0 mL Intravenous Stopped 2/24/25 1133)   ondansetron (ZOFRAN) injection 4 mg (4 mg Intravenous Given 2/24/25 0942)   ketorolac (TORADOL) injection 15 mg (15 mg Intravenous Given 2/24/25 0942)                   MEDICAL DECISION MAKING, PROGRESS, and CONSULTS    All labs have been independently reviewed by me.  All radiology studies have been reviewed by me and I have also reviewed the radiology report.   EKG's independently viewed and interpreted by me.  Discussion below represents my analysis of pertinent findings related to patient's condition, differential diagnosis, treatment plan and final disposition.      Additional sources:  - Discussed/ obtained  information from independent historians: History obtained from the patient herself at bedside.    - External (non-ED) record review: Upon medical records review, the patient was last seen and evaluated in the outpatient office of family medicine on 2/17/2025 in follow-up for known hypothyroidism.  I also reviewed her urgent care visit from earlier today, 2/24/2025, where she was advised to come to the ED today for evaluation.    - Chronic or social conditions impacting care: None reported    - Shared decision making: Admission decision based on shared conversations have between myself, the patient and family at bedside, Dr. Beck general surgery, as well as Dr. Salamanca with [facility].        Orders placed during this visit:  Orders Placed This Encounter   Procedures    Respiratory Panel PCR w/COVID-19(SARS-CoV-2) KEYANA/MYA/GIOVANNI/PAD/COR/EDUARDO In-House, NP Swab in UTM/VTM, 2 HR TAT - Swab, Nasopharynx    Blood Culture - Blood,    Blood Culture - Blood,    XR Chest 1 View    US Gallbladder    Comprehensive Metabolic Panel    hCG, Serum, Qualitative    Urinalysis With Microscopic If Indicated (No Culture) - Urine, Clean Catch    Lactic Acid, Plasma    High Sensitivity Troponin T    CBC Auto Differential    High Sensitivity Troponin T 1Hr    Urinalysis, Microscopic Only - Urine, Clean Catch    Surgery (on-call MD unless specified)    [facility] (on-call MD unless specified) Details    ECG 12 Lead Other; weakness    Inpatient Admission    CBC & Differential             Differential diagnosis includes but is not limited to:    COVID-19, influenza, viral respiratory infection, pneumonia, viral gastroenteritis, acute anemia, electrolyte disturbance, or acute renal insufficiency      Independent interpretation of labs, radiology studies, and discussions with consultants:    Chest x-ray independently interpreted by myself with my interpretation showing no cardiomegaly nor edema.  There is a probable left lower lobe infiltrate present.      ED  Course as of 02/24/25 1410   Mon Feb 24, 2025   1327 On reevaluation, the patient is resting more comfortably.  Her heart rate is down a bit but still elevated to 104.  She continues to deny any abdominal discomfort however.  I did inform her that her electrolytes do show some abnormalities with her sodium and potassium both low as well as LFTs elevated.  Her gallbladder is showing full of gallstones as well though no obvious sonographic sign of cholecystitis.  However with the gallstones, leukocytosis, as well as elevated LFTs, I would like to discuss the case with general surgery for their input.  Ultimately, we will admit the patient to the hospital today for further management and treatment. [BM]   1338 The patient's presentation, workup, as well as diagnosis and treatment plan was discussed at length with Dr. Beck, general surgery.  She will see the patient in consultation but would like the patient admitted to medicine. [BM]   1404 The patient's presentation, workup, as well as diagnosis and treatment plan was discussed at length with Dr. Salamanca of [facility].  He agrees to admit the patient to the hospital today for further management and treatment. [BM]   1406 The patient's chest x-ray is also very concerning for a possible left lower lobe pneumonia.  We will obtain blood cultures and begin IV antibiotics as well for further treatment. [BM]      ED Course User Index  [BM] Rafa Castillo MD           DIAGNOSIS  Final diagnoses:   Calculus of gallbladder without cholecystitis without obstruction   Nausea and vomiting, unspecified vomiting type   Generalized weakness   Hyponatremia   Hypokalemia   Pneumonia of left lower lobe due to infectious organism         DISPOSITION  ADMISSION    Discussed treatment plan and reason for admission with pt/family and admitting physician.  Pt/family voiced understanding of the plan for admission for further testing/treatment as needed.               Latest Documented Vital  Signs:  As of 14:10 EST  BP- (!) 126/101 HR- 94 Temp- 98.4 °F (36.9 °C) (Tympanic) O2 sat- 96%              --    Please note that portions of this were completed with a voice recognition program.       Note Disclaimer: At [facility], we believe that sharing information builds trust and better relationships. You are receiving this note because you are receiving care at [facility] or recently visited. It is possible you will see health information before a provider has talked with you about it. This kind of information can be easy to misunderstand. To help you fully understand what it means for your health, we urge you to discuss this note with your provider.             Rafa Castillo MD  02/24/25 [id number]

## 2025-02-24 NOTE — H&P
Internal medicine history and physical  INTERNAL MEDICINE   [facility]       Patient Identification:  Name: Laura Cuenca  Age: 44 y.o.  Sex: female  :  1980  MRN: [id number]                   Primary Care Physician: Georgiana Hammonds MD                               Date of admission:2025    Chief Complaint: Fatigue nausea decreased appetite weakness and chills and not feeling very well for the last 5-6 days.    History of Present Illness:   Patient is a 44-year-old female who has complicated past medical history including history of meningioma, history of craniotomy in  for dermoid cyst , hypothyroidism, hypertension and chronic back pain and migraine was in her usual state of her health until 5- 6 days ago when she developed an episode of nausea followed by increased fatigue weakness chills and declining energy level.  She was having sweats and decreased appetite.  She also had an episode of diarrhea earlier which resolved.  She continued to have the symptoms to the point that she went to immediate care center earlier today and was found to have significant tachycardia and this along with her appearance she was referred to the emergency room for possible underlying sepsis or intra-abdominal illness.  Patient denies any cough or shortness of breath and was noted to have oxygen saturation of 97% on room air.  Chest x-ray shows left lung opacities concerning for pneumonia.  Blood work revealed ALT of 165 AST of 210 alkaline phosphatase of 175 along with hyponatremia and potassium of 3.1 as well as abnormal urinalysis.  Patient does not have any urinary symptoms.  She was noted to have white blood cell count of 11.38.  Ultrasound of the gallbladder shows cholelithiasis without evidence of cholecystitis in terms of sonographic Gunter sign or wall thickening.  And physical examination did not reveal any upper abdominal discomfort.  Patient received IV fluid bolus in the emergency room  with significant improvement in sense of wellbeing and tachycardia and started on ceftriaxone for pneumonia.  Respiratory viral panel is negative.  General surgery service is consulted.    Past Medical History:  Past Medical History:   Diagnosis Date    Allergic     egg whites, oak trees, wheat    Congenital deformity     TOES    Environmental allergies     Frontal mass of brain     Heartburn     ON OCC    High blood pressure     Hypothyroidism 2016?    Insomnia     Lumbar disc disorder     STATES HAS THREE DISC IN BACK THAT HAVE BEEN FUSED SINCE BIRTH    Meningioma 02/2021    Migraine     PT STATED SHE HAS HAD MIGRAINES SINCE A CHILD. HAD AN MRI FEB 2021--FRONTAL MASS.     Thyroid disease     HYPO    Vision loss      Past Surgical History:  Past Surgical History:   Procedure Laterality Date    BRAIN SURGERY  2/26/2021    Craniotomy    CRANIOTOMY FOR TUMOR N/A 02/26/2021    Procedure: BIFRONTAL CRANIOTOMY FOR TUMOR STEREOTACTIC, WITH FAT GRAFTING;  Surgeon: Gume Carver MD;  Location: [facility];  Service: Neurosurgery;  Laterality: N/A;    US GUIDED CYST ASPIRATION BREAST N/A 4/1/2024    WISDOM TOOTH EXTRACTION        Home Meds:  Medications Prior to Admission   Medication Sig Dispense Refill Last Dose/Taking    amitriptyline (ELAVIL) 25 MG tablet Take 2 tablets by mouth Every Night. 180 tablet 3 Taking    aspirin 81 MG EC tablet Take 1 tablet by mouth Daily.   Taking    Atogepant 60 MG tablet Take 1 tablet by mouth Daily. 30 tablet 2 Taking    atorvastatin (LIPITOR) 10 MG tablet Take 1 tablet by mouth Every Night. 90 tablet 3 Taking    fexofenadine (ALLEGRA) 180 MG tablet Take 1 tablet by mouth Daily.   Taking    Ibuprofen 3 %, Gabapentin 10 %, Baclofen 2 %, lidocaine 4 % Apply 1-2 g topically to the appropriate area as directed 3 (Three) to 4 (Four) times daily. 90 g 0 Taking    levothyroxine (SYNTHROID, LEVOTHROID) 137 MCG tablet Take 1 tablet by mouth Every Morning. (Patient taking differently: Take 1  tablet by mouth Every Morning.) 90 tablet 0 Taking Differently    topiramate (Topamax) 100 MG tablet Take 1 tablet by mouth 2 (Two) Times a Day. 60 tablet 2 Taking    ZOLMitriptan (ZOMIG-ZMT) 5 MG disintegrating tablet Take one tablet by mouth as needed for migraine. May repeat in 2 hours if needed. Do not exceed 2 tablets in 24 hours. (Patient taking differently: Place 1 tablet on the tongue 1 (One) Time As Needed for Migraine. Take one tablet by mouth as needed for migraine. May repeat in 2 hours if needed. Do not exceed 2 tablets in 24 hours.) 9 tablet 2 Taking Differently     Current Meds:   No current facility-administered medications for this encounter.  Allergies:  Allergies   Allergen Reactions    Egg White [Egg White (Egg Protein)] Other (See Comments)     GIVES HER GAS    Pollen Extract Unknown - Low Severity    Wheat Unknown - Low Severity    Percocet [Oxycodone-Acetaminophen] Rash     Social History:   Social History     Tobacco Use    Smoking status: Former     Current packs/day: 0.00     Average packs/day: 0.5 packs/day for 20.0 years (10.0 ttl pk-yrs)     Types: Cigarettes     Start date: 2016     Quit date: 2021     Years since quittin.0    Smokeless tobacco: Never   Substance Use Topics    Alcohol use: Yes     Comment: Maybe one or two drinks every 6 weeks.      Family History:  Family History   Problem Relation Age of Onset    Heart disease Father     Hypertension Father     Heart disease Mother     Hypertension Mother     Cancer Mother     Diabetes Mother     Endometrial cancer Mother     Early death Mother     Hyperlipidemia Mother     Vision loss Mother     Endometrial cancer Brother     Uterine cancer Maternal Grandmother     Malig Hyperthermia Neg Hx     Breast cancer Neg Hx     Ovarian cancer Neg Hx     Colon cancer Neg Hx           Review of Systems  See history of present illness and past medical history.  As described in history presenting illness no significant abdominal  "tenderness      Vitals:   /88 (BP Location: Left arm, Patient Position: Lying)   Pulse 105   Temp 98.1 °F (36.7 °C) (Oral)   Resp 18   Ht 152.4 cm (60\")   Wt 61.7 kg (136 lb)   SpO2 98%   BMI 26.56 kg/m²   I/O:   Intake/Output Summary (Last 24 hours) at 2/24/2025 1723  Last data filed at 2/24/2025 1555  Gross per 24 hour   Intake 1100 ml   Output --   Net 1100 ml     Exam:  Patient is examined using the personal protective equipment as per guidelines from infection control for this particular patient as enacted.  Hand washing was performed before and after patient interaction.  General Appearance:  Alert cooperative ill-appearing female who claims to be feeling somewhat better.   Head:    Normocephalic, without obvious abnormality, atraumatic   Eyes:    PERRL, conjunctiva/corneas clear, EOM's intact, both eyes   Ears:    Normal external ear canals, both ears   Nose: No nasal drainage.   Throat:   Lips, tongue, gums normal; oral mucosa pink and moist   Neck: Supple and no adenopathy   Back:     Symmetric, no curvature, ROM normal, no CVA tenderness   Lungs:     Clear to auscultation bilaterally, respirations unlabored   Chest Wall:    No tenderness or deformity    Heart:    Regular rate and rhythm, S1 and S2 normal, no murmur, rub   or gallop   Abdomen:   No significant abdominal tenderness   Extremities:   Extremities normal, atraumatic, no cyanosis or edema   Pulses:   Pulses palpable in all extremities; symmetric all extremities   Skin:   Skin color normal, Skin is warm and dry,  no rashes or palpable lesions   Neurologic: Alert and oriented x 3       Data Review:      I reviewed the patient's new clinical results.  Results from last 7 days   Lab Units 02/24/25  0936   WBC 10*3/mm3 11.38*   HEMOGLOBIN g/dL 15.1   PLATELETS 10*3/mm3 294     Results from last 7 days   Lab Units 02/24/25  0936   SODIUM mmol/L 128*   POTASSIUM mmol/L 3.1*   CHLORIDE mmol/L 95*   CO2 mmol/L 21.0*   BUN mg/dL 12 "   CREATININE mg/dL 0.72   CALCIUM mg/dL 9.6   GLUCOSE mg/dL 129*     US Gallbladder    Result Date: 2/24/2025  Wall echo shadow complex indicating gallbladder full of gallstones. No wall thickening or reported sonographic Gunter sign.  This report was finalized on 2/24/2025 1:18 PM by Dr. Rod Madrid M.D on Workstation: [id number]      XR Chest 1 View    Result Date: 2/24/2025   1. Left lower lung opacities concerning for pneumonia. Recommend follow-up to resolution. 2. Potential small left pleural effusion.  This report was finalized on 2/24/2025 10:04 AM by Dr. Rod Madrid M.D on Workstation: [id number]     Microbiology Results (last 10 days)       Procedure Component Value - Date/Time    Respiratory Panel PCR w/COVID-19(SARS-CoV-2) KEYANA/MYA/GIOVANNI/PAD/COR/EDUARDO In-House, NP Swab in UTM/VTM, 2 HR TAT - Swab, Nasopharynx [[id number]]  (Normal) Collected: 02/24/25 0936    Lab Status: Final result Specimen: Swab from Nasopharynx Updated: 02/24/25 1051     ADENOVIRUS, PCR Not Detected     Coronavirus 229E Not Detected     Coronavirus HKU1 Not Detected     Coronavirus NL63 Not Detected     Coronavirus OC43 Not Detected     COVID19 Not Detected     Human Metapneumovirus Not Detected     Human Rhinovirus/Enterovirus Not Detected     Influenza A PCR Not Detected     Influenza B PCR Not Detected     Parainfluenza Virus 1 Not Detected     Parainfluenza Virus 2 Not Detected     Parainfluenza Virus 3 Not Detected     Parainfluenza Virus 4 Not Detected     RSV, PCR Not Detected     Bordetella pertussis pcr Not Detected     Bordetella parapertussis PCR Not Detected     Chlamydophila pneumoniae PCR Not Detected     Mycoplasma pneumo by PCR Not Detected    Narrative:      In the setting of a positive respiratory panel with a viral infection PLUS a negative procalcitonin without other underlying concern for bacterial infection, consider observing off antibiotics or discontinuation of antibiotics and continue supportive  care. If the respiratory panel is positive for atypical bacterial infection (Bordetella pertussis, Chlamydophila pneumoniae, or Mycoplasma pneumoniae), consider antibiotic de-escalation to target atypical bacterial infection.          Brief Urine Lab Results  (Last result in the past 365 days)        Color   Clarity   Blood   Leuk Est   Nitrite   Protein   CREAT   Urine HCG        02/24/25 1009 Yellow   Cloudy   Moderate (2+)   Small (1+)   Negative   100 mg/dL (2+)                 ECG 12 Lead Other; weakness   Final Result   HEART OXQZ=617  bpm   RR Dgtitlmq=792  ms   WV Azeyswow=172  ms   P Horizontal Axis=32  deg   P Front Axis=40  deg   QRSD Interval=96  ms   QT Ftoxichw=899  ms   JToG=328  ms   QRS Axis=-12  deg   T Wave Axis=27  deg   - OTHERWISE NORMAL ECG -   Sinus tachycardia - new   Borderline  low voltage, extremity leads   Electronically Signed By: Marlene Prasad ([facility]) 2025-02-24 16:14:35   Date and Time of Study:2025-02-24 09:52:04          Assessment:  Active Hospital Problems    Diagnosis  POA    **Nausea & vomiting [R11.2]  Yes    Abnormal urinalysis [R82.90]  Unknown    Gall stones [K80.20]  Unknown    Lung infiltrate on CT [R91.8]  Unknown    Tachycardia [R00.0]  Unknown    Hypothyroidism [E03.9]  Unknown    Meningioma [D32.9]  Unknown    Dyslipidemia [E78.5]  Unknown    Hyponatremia [E87.1]  Unknown       Medical decision making/care plan: See admitting orders  Fatigue nausea decreased appetite chills tachycardia and episode of vomiting and an episode of diarrhea with workup revealing abnormal LFTs, gallstones without evidence of cholecystitis and chest x-ray showing lung infiltrate and urinalysis showing findings concerning for UTI the patient does not have any specific urinary symptoms-this presentation could be reflection of underlying infectious/inflammatory stress due to combination of factors ranging from:  - pneumonia with   - symptomatic cholelithiasis with evolving cholecystitis and   -  Possible evolving UTI.-Plan is to admit the patient, IV fluids and empiric IV Rocephin after blood cultures are drawn and follow-up on urine culture results and general surgery evaluation.  Hyponatremia tachycardia-likely represent dehydration and volume depletion-continue with volume resuscitation.  Patient has received IV fluid bolus in the emergency room we will continue with normal saline at 125 cc an hour and replace electrolytes per protocol.  Hypothyroidism-continue thyroid replacement therapy.  History of meningioma and dermoid cyst status post craniotomy clinically doing stable plan is to monitor.  Braeden Salamanca MD   2/24/2025  17:23 EST    Parts of this note may be an electronic transcription/translation of spoken language to printed text using the Dragon dictation system.

## 2025-02-25 ENCOUNTER — ANESTHESIA (OUTPATIENT)
Dept: PERIOP | Facility: HOSPITAL | Age: 45
End: 2025-02-25
Payer: COMMERCIAL

## 2025-02-25 ENCOUNTER — ANESTHESIA EVENT (OUTPATIENT)
Dept: PERIOP | Facility: HOSPITAL | Age: 45
End: 2025-02-25
Payer: COMMERCIAL

## 2025-02-25 ENCOUNTER — APPOINTMENT (OUTPATIENT)
Dept: GENERAL RADIOLOGY | Facility: HOSPITAL | Age: 45
End: 2025-02-25
Payer: COMMERCIAL

## 2025-02-25 PROBLEM — K80.00 CHOLELITHIASIS WITH ACUTE CHOLECYSTITIS: Status: ACTIVE | Noted: 2025-02-25

## 2025-02-25 LAB
ALBUMIN SERPL-MCNC: 2.9 G/DL (ref 3.5–5.2)
ALBUMIN/GLOB SERPL: 0.9 G/DL
ALP SERPL-CCNC: 143 U/L (ref 39–117)
ALT SERPL W P-5'-P-CCNC: 202 U/L (ref 1–33)
ANION GAP SERPL CALCULATED.3IONS-SCNC: 12 MMOL/L (ref 5–15)
AST SERPL-CCNC: 272 U/L (ref 1–32)
BASOPHILS # BLD AUTO: 0.04 10*3/MM3 (ref 0–0.2)
BASOPHILS NFR BLD AUTO: 0.5 % (ref 0–1.5)
BILIRUB SERPL-MCNC: 0.2 MG/DL (ref 0–1.2)
BUN SERPL-MCNC: 9 MG/DL (ref 6–20)
BUN/CREAT SERPL: 17 (ref 7–25)
CALCIUM SPEC-SCNC: 8.3 MG/DL (ref 8.6–10.5)
CHLORIDE SERPL-SCNC: 107 MMOL/L (ref 98–107)
CO2 SERPL-SCNC: 16 MMOL/L (ref 22–29)
CREAT SERPL-MCNC: 0.53 MG/DL (ref 0.57–1)
D-LACTATE SERPL-SCNC: 0.9 MMOL/L (ref 0.5–2)
DEPRECATED RDW RBC AUTO: 42 FL (ref 37–54)
EGFRCR SERPLBLD CKD-EPI 2021: 117.9 ML/MIN/1.73
EOSINOPHIL # BLD AUTO: 0.12 10*3/MM3 (ref 0–0.4)
EOSINOPHIL NFR BLD AUTO: 1.4 % (ref 0.3–6.2)
ERYTHROCYTE [DISTWIDTH] IN BLOOD BY AUTOMATED COUNT: 13 % (ref 12.3–15.4)
GLOBULIN UR ELPH-MCNC: 3.3 GM/DL
GLUCOSE SERPL-MCNC: 88 MG/DL (ref 65–99)
HCT VFR BLD AUTO: 36.7 % (ref 34–46.6)
HGB BLD-MCNC: 12.7 G/DL (ref 12–15.9)
IMM GRANULOCYTES # BLD AUTO: 0.23 10*3/MM3 (ref 0–0.05)
IMM GRANULOCYTES NFR BLD AUTO: 2.7 % (ref 0–0.5)
LYMPHOCYTES # BLD AUTO: 1.36 10*3/MM3 (ref 0.7–3.1)
LYMPHOCYTES NFR BLD AUTO: 16 % (ref 19.6–45.3)
MAGNESIUM SERPL-MCNC: 2 MG/DL (ref 1.6–2.6)
MCH RBC QN AUTO: 30.8 PG (ref 26.6–33)
MCHC RBC AUTO-ENTMCNC: 34.6 G/DL (ref 31.5–35.7)
MCV RBC AUTO: 89.1 FL (ref 79–97)
MONOCYTES # BLD AUTO: 1.38 10*3/MM3 (ref 0.1–0.9)
MONOCYTES NFR BLD AUTO: 16.2 % (ref 5–12)
NEUTROPHILS NFR BLD AUTO: 5.39 10*3/MM3 (ref 1.7–7)
NEUTROPHILS NFR BLD AUTO: 63.2 % (ref 42.7–76)
NRBC BLD AUTO-RTO: 0 /100 WBC (ref 0–0.2)
PLATELET # BLD AUTO: 274 10*3/MM3 (ref 140–450)
PMV BLD AUTO: 10.7 FL (ref 6–12)
POTASSIUM SERPL-SCNC: 3.5 MMOL/L (ref 3.5–5.2)
POTASSIUM SERPL-SCNC: 3.9 MMOL/L (ref 3.5–5.2)
PROCALCITONIN SERPL-MCNC: 0.11 NG/ML (ref 0–0.25)
PROT SERPL-MCNC: 6.2 G/DL (ref 6–8.5)
RBC # BLD AUTO: 4.12 10*6/MM3 (ref 3.77–5.28)
SODIUM SERPL-SCNC: 135 MMOL/L (ref 136–145)
T4 FREE SERPL-MCNC: 1.32 NG/DL (ref 0.92–1.68)
TSH SERPL DL<=0.05 MIU/L-ACNC: 0.09 UIU/ML (ref 0.27–4.2)
WBC NRBC COR # BLD AUTO: 8.52 10*3/MM3 (ref 3.4–10.8)

## 2025-02-25 PROCEDURE — 83735 ASSAY OF MAGNESIUM: CPT | Performed by: INTERNAL MEDICINE

## 2025-02-25 PROCEDURE — 25010000002 KETOROLAC TROMETHAMINE PER 15 MG: Performed by: STUDENT IN AN ORGANIZED HEALTH CARE EDUCATION/TRAINING PROGRAM

## 2025-02-25 PROCEDURE — 47563 LAPARO CHOLECYSTECTOMY/GRAPH: CPT | Performed by: STUDENT IN AN ORGANIZED HEALTH CARE EDUCATION/TRAINING PROGRAM

## 2025-02-25 PROCEDURE — 25810000003 LACTATED RINGERS PER 1000 ML: Performed by: HOSPITALIST

## 2025-02-25 PROCEDURE — 47563 LAPARO CHOLECYSTECTOMY/GRAPH: CPT | Performed by: SPECIALIST/TECHNOLOGIST, OTHER

## 2025-02-25 PROCEDURE — 80053 COMPREHEN METABOLIC PANEL: CPT | Performed by: INTERNAL MEDICINE

## 2025-02-25 PROCEDURE — 25010000002 CEFTRIAXONE PER 250 MG: Performed by: INTERNAL MEDICINE

## 2025-02-25 PROCEDURE — 96361 HYDRATE IV INFUSION ADD-ON: CPT

## 2025-02-25 PROCEDURE — 85025 COMPLETE CBC W/AUTO DIFF WBC: CPT | Performed by: INTERNAL MEDICINE

## 2025-02-25 PROCEDURE — 25010000002 FENTANYL CITRATE (PF) 50 MCG/ML SOLUTION: Performed by: STUDENT IN AN ORGANIZED HEALTH CARE EDUCATION/TRAINING PROGRAM

## 2025-02-25 PROCEDURE — G0378 HOSPITAL OBSERVATION PER HR: HCPCS

## 2025-02-25 PROCEDURE — 83605 ASSAY OF LACTIC ACID: CPT | Performed by: HOSPITALIST

## 2025-02-25 PROCEDURE — 25010000002 DEXAMETHASONE SODIUM PHOSPHATE 20 MG/5ML SOLUTION: Performed by: STUDENT IN AN ORGANIZED HEALTH CARE EDUCATION/TRAINING PROGRAM

## 2025-02-25 PROCEDURE — 25010000002 ONDANSETRON PER 1 MG: Performed by: STUDENT IN AN ORGANIZED HEALTH CARE EDUCATION/TRAINING PROGRAM

## 2025-02-25 PROCEDURE — C1894 INTRO/SHEATH, NON-LASER: HCPCS | Performed by: STUDENT IN AN ORGANIZED HEALTH CARE EDUCATION/TRAINING PROGRAM

## 2025-02-25 PROCEDURE — 84132 ASSAY OF SERUM POTASSIUM: CPT | Performed by: HOSPITALIST

## 2025-02-25 PROCEDURE — 25010000002 HYDROMORPHONE 1 MG/ML SOLUTION: Performed by: STUDENT IN AN ORGANIZED HEALTH CARE EDUCATION/TRAINING PROGRAM

## 2025-02-25 PROCEDURE — 84443 ASSAY THYROID STIM HORMONE: CPT | Performed by: HOSPITALIST

## 2025-02-25 PROCEDURE — 88304 TISSUE EXAM BY PATHOLOGIST: CPT | Performed by: STUDENT IN AN ORGANIZED HEALTH CARE EDUCATION/TRAINING PROGRAM

## 2025-02-25 PROCEDURE — 25010000002 SUGAMMADEX 200 MG/2ML SOLUTION: Performed by: NURSE ANESTHETIST, CERTIFIED REGISTERED

## 2025-02-25 PROCEDURE — 25010000002 LIDOCAINE 2% SOLUTION: Performed by: STUDENT IN AN ORGANIZED HEALTH CARE EDUCATION/TRAINING PROGRAM

## 2025-02-25 PROCEDURE — 25010000002 SODIUM CHLORIDE 0.9 % WITH KCL 20 MEQ 20-0.9 MEQ/L-% SOLUTION: Performed by: INTERNAL MEDICINE

## 2025-02-25 PROCEDURE — 74300 X-RAY BILE DUCTS/PANCREAS: CPT

## 2025-02-25 PROCEDURE — 84439 ASSAY OF FREE THYROXINE: CPT | Performed by: HOSPITALIST

## 2025-02-25 PROCEDURE — 84145 PROCALCITONIN (PCT): CPT | Performed by: HOSPITALIST

## 2025-02-25 PROCEDURE — 25010000002 PROPOFOL 200 MG/20ML EMULSION: Performed by: STUDENT IN AN ORGANIZED HEALTH CARE EDUCATION/TRAINING PROGRAM

## 2025-02-25 PROCEDURE — 25010000002 MIDAZOLAM PER 1 MG: Performed by: STUDENT IN AN ORGANIZED HEALTH CARE EDUCATION/TRAINING PROGRAM

## 2025-02-25 PROCEDURE — 25810000003 LACTATED RINGERS PER 1000 ML: Performed by: STUDENT IN AN ORGANIZED HEALTH CARE EDUCATION/TRAINING PROGRAM

## 2025-02-25 DEVICE — HORIZON TI ML 6 CLIPS/CART
Type: IMPLANTABLE DEVICE | Site: ABDOMEN | Status: FUNCTIONAL
Brand: WECK

## 2025-02-25 RX ORDER — SODIUM CHLORIDE 0.9 % (FLUSH) 0.9 %
3 SYRINGE (ML) INJECTION EVERY 12 HOURS SCHEDULED
Status: DISCONTINUED | OUTPATIENT
Start: 2025-02-25 | End: 2025-02-25 | Stop reason: HOSPADM

## 2025-02-25 RX ORDER — KETOROLAC TROMETHAMINE 30 MG/ML
INJECTION, SOLUTION INTRAMUSCULAR; INTRAVENOUS AS NEEDED
Status: DISCONTINUED | OUTPATIENT
Start: 2025-02-25 | End: 2025-02-25 | Stop reason: SURG

## 2025-02-25 RX ORDER — ONDANSETRON 2 MG/ML
4 INJECTION INTRAMUSCULAR; INTRAVENOUS ONCE AS NEEDED
Status: DISCONTINUED | OUTPATIENT
Start: 2025-02-25 | End: 2025-02-25 | Stop reason: HOSPADM

## 2025-02-25 RX ORDER — MIDAZOLAM HYDROCHLORIDE 1 MG/ML
1 INJECTION, SOLUTION INTRAMUSCULAR; INTRAVENOUS
Status: DISCONTINUED | OUTPATIENT
Start: 2025-02-25 | End: 2025-02-25 | Stop reason: HOSPADM

## 2025-02-25 RX ORDER — DEXAMETHASONE SODIUM PHOSPHATE 4 MG/ML
INJECTION, SOLUTION INTRA-ARTICULAR; INTRALESIONAL; INTRAMUSCULAR; INTRAVENOUS; SOFT TISSUE AS NEEDED
Status: DISCONTINUED | OUTPATIENT
Start: 2025-02-25 | End: 2025-02-25 | Stop reason: SURG

## 2025-02-25 RX ORDER — ENALAPRILAT 1.25 MG/ML
2.5 INJECTION INTRAVENOUS ONCE AS NEEDED
Status: DISCONTINUED | OUTPATIENT
Start: 2025-02-25 | End: 2025-02-25 | Stop reason: HOSPADM

## 2025-02-25 RX ORDER — METHOCARBAMOL 100 MG/ML
500 INJECTION, SOLUTION INTRAMUSCULAR; INTRAVENOUS
Status: DISCONTINUED | OUTPATIENT
Start: 2025-02-25 | End: 2025-02-25 | Stop reason: HOSPADM

## 2025-02-25 RX ORDER — HYDROCODONE BITARTRATE AND ACETAMINOPHEN 5; 325 MG/1; MG/1
1 TABLET ORAL EVERY 6 HOURS PRN
Status: DISCONTINUED | OUTPATIENT
Start: 2025-02-25 | End: 2025-02-26 | Stop reason: HOSPADM

## 2025-02-25 RX ORDER — HYDROMORPHONE HYDROCHLORIDE 1 MG/ML
0.5 INJECTION, SOLUTION INTRAMUSCULAR; INTRAVENOUS; SUBCUTANEOUS
Status: DISCONTINUED | OUTPATIENT
Start: 2025-02-25 | End: 2025-02-25 | Stop reason: HOSPADM

## 2025-02-25 RX ORDER — HYDROCODONE BITARTRATE AND ACETAMINOPHEN 5; 325 MG/1; MG/1
1 TABLET ORAL ONCE AS NEEDED
Status: DISCONTINUED | OUTPATIENT
Start: 2025-02-25 | End: 2025-02-25 | Stop reason: HOSPADM

## 2025-02-25 RX ORDER — HYDRALAZINE HYDROCHLORIDE 20 MG/ML
5 INJECTION INTRAMUSCULAR; INTRAVENOUS
Status: DISCONTINUED | OUTPATIENT
Start: 2025-02-25 | End: 2025-02-25 | Stop reason: HOSPADM

## 2025-02-25 RX ORDER — NALOXONE HCL 0.4 MG/ML
0.4 VIAL (ML) INJECTION AS NEEDED
Status: DISCONTINUED | OUTPATIENT
Start: 2025-02-25 | End: 2025-02-25 | Stop reason: HOSPADM

## 2025-02-25 RX ORDER — PROPOFOL 10 MG/ML
INJECTION, EMULSION INTRAVENOUS AS NEEDED
Status: DISCONTINUED | OUTPATIENT
Start: 2025-02-25 | End: 2025-02-25 | Stop reason: SURG

## 2025-02-25 RX ORDER — IPRATROPIUM BROMIDE AND ALBUTEROL SULFATE 2.5; .5 MG/3ML; MG/3ML
3 SOLUTION RESPIRATORY (INHALATION) ONCE AS NEEDED
Status: DISCONTINUED | OUTPATIENT
Start: 2025-02-25 | End: 2025-02-25 | Stop reason: HOSPADM

## 2025-02-25 RX ORDER — ROCURONIUM BROMIDE 10 MG/ML
INJECTION, SOLUTION INTRAVENOUS AS NEEDED
Status: DISCONTINUED | OUTPATIENT
Start: 2025-02-25 | End: 2025-02-25 | Stop reason: SURG

## 2025-02-25 RX ORDER — ONDANSETRON 2 MG/ML
INJECTION INTRAMUSCULAR; INTRAVENOUS AS NEEDED
Status: DISCONTINUED | OUTPATIENT
Start: 2025-02-25 | End: 2025-02-25 | Stop reason: SURG

## 2025-02-25 RX ORDER — POTASSIUM CHLORIDE 1500 MG/1
40 TABLET, EXTENDED RELEASE ORAL EVERY 4 HOURS
Status: COMPLETED | OUTPATIENT
Start: 2025-02-25 | End: 2025-02-25

## 2025-02-25 RX ORDER — DIPHENHYDRAMINE HYDROCHLORIDE 50 MG/ML
12.5 INJECTION INTRAMUSCULAR; INTRAVENOUS
Status: DISCONTINUED | OUTPATIENT
Start: 2025-02-25 | End: 2025-02-25 | Stop reason: HOSPADM

## 2025-02-25 RX ORDER — LIDOCAINE HYDROCHLORIDE 10 MG/ML
0.5 INJECTION, SOLUTION INFILTRATION; PERINEURAL ONCE AS NEEDED
Status: DISCONTINUED | OUTPATIENT
Start: 2025-02-25 | End: 2025-02-25 | Stop reason: HOSPADM

## 2025-02-25 RX ORDER — LABETALOL HYDROCHLORIDE 5 MG/ML
5 INJECTION, SOLUTION INTRAVENOUS
Status: DISCONTINUED | OUTPATIENT
Start: 2025-02-25 | End: 2025-02-25 | Stop reason: HOSPADM

## 2025-02-25 RX ORDER — SODIUM CHLORIDE, SODIUM LACTATE, POTASSIUM CHLORIDE, CALCIUM CHLORIDE 600; 310; 30; 20 MG/100ML; MG/100ML; MG/100ML; MG/100ML
9 INJECTION, SOLUTION INTRAVENOUS CONTINUOUS
Status: DISCONTINUED | OUTPATIENT
Start: 2025-02-25 | End: 2025-02-25

## 2025-02-25 RX ORDER — LIDOCAINE HYDROCHLORIDE 20 MG/ML
INJECTION, SOLUTION INFILTRATION; PERINEURAL AS NEEDED
Status: DISCONTINUED | OUTPATIENT
Start: 2025-02-25 | End: 2025-02-25 | Stop reason: SURG

## 2025-02-25 RX ORDER — DIPHENHYDRAMINE HYDROCHLORIDE 50 MG/ML
12.5 INJECTION INTRAMUSCULAR; INTRAVENOUS ONCE AS NEEDED
Status: DISCONTINUED | OUTPATIENT
Start: 2025-02-25 | End: 2025-02-25 | Stop reason: HOSPADM

## 2025-02-25 RX ORDER — SODIUM CHLORIDE, SODIUM LACTATE, POTASSIUM CHLORIDE, CALCIUM CHLORIDE 600; 310; 30; 20 MG/100ML; MG/100ML; MG/100ML; MG/100ML
INJECTION, SOLUTION INTRAVENOUS CONTINUOUS PRN
Status: DISCONTINUED | OUTPATIENT
Start: 2025-02-25 | End: 2025-02-25 | Stop reason: SURG

## 2025-02-25 RX ORDER — FENTANYL CITRATE 50 UG/ML
25 INJECTION, SOLUTION INTRAMUSCULAR; INTRAVENOUS
Status: DISCONTINUED | OUTPATIENT
Start: 2025-02-25 | End: 2025-02-25 | Stop reason: HOSPADM

## 2025-02-25 RX ORDER — SODIUM CHLORIDE 0.9 % (FLUSH) 0.9 %
3-10 SYRINGE (ML) INJECTION AS NEEDED
Status: DISCONTINUED | OUTPATIENT
Start: 2025-02-25 | End: 2025-02-25 | Stop reason: HOSPADM

## 2025-02-25 RX ORDER — SODIUM CHLORIDE, SODIUM LACTATE, POTASSIUM CHLORIDE, CALCIUM CHLORIDE 600; 310; 30; 20 MG/100ML; MG/100ML; MG/100ML; MG/100ML
100 INJECTION, SOLUTION INTRAVENOUS CONTINUOUS
Status: DISCONTINUED | OUTPATIENT
Start: 2025-02-25 | End: 2025-02-26 | Stop reason: HOSPADM

## 2025-02-25 RX ORDER — FENTANYL CITRATE 50 UG/ML
50 INJECTION, SOLUTION INTRAMUSCULAR; INTRAVENOUS ONCE AS NEEDED
Status: DISCONTINUED | OUTPATIENT
Start: 2025-02-25 | End: 2025-02-25 | Stop reason: HOSPADM

## 2025-02-25 RX ORDER — PROCHLORPERAZINE EDISYLATE 5 MG/ML
10 INJECTION INTRAMUSCULAR; INTRAVENOUS EVERY 6 HOURS PRN
Status: DISCONTINUED | OUTPATIENT
Start: 2025-02-25 | End: 2025-02-25 | Stop reason: HOSPADM

## 2025-02-25 RX ORDER — FENTANYL CITRATE 50 UG/ML
INJECTION, SOLUTION INTRAMUSCULAR; INTRAVENOUS AS NEEDED
Status: DISCONTINUED | OUTPATIENT
Start: 2025-02-25 | End: 2025-02-25 | Stop reason: SURG

## 2025-02-25 RX ORDER — HYDROCODONE BITARTRATE AND ACETAMINOPHEN 10; 325 MG/1; MG/1
1 TABLET ORAL EVERY 4 HOURS PRN
Status: DISCONTINUED | OUTPATIENT
Start: 2025-02-25 | End: 2025-02-25 | Stop reason: HOSPADM

## 2025-02-25 RX ORDER — FAMOTIDINE 10 MG/ML
20 INJECTION, SOLUTION INTRAVENOUS ONCE
Status: COMPLETED | OUTPATIENT
Start: 2025-02-25 | End: 2025-02-25

## 2025-02-25 RX ORDER — BUPIVACAINE HYDROCHLORIDE AND EPINEPHRINE 2.5; 5 MG/ML; UG/ML
INJECTION, SOLUTION INFILTRATION; PERINEURAL AS NEEDED
Status: DISCONTINUED | OUTPATIENT
Start: 2025-02-25 | End: 2025-02-25 | Stop reason: HOSPADM

## 2025-02-25 RX ADMIN — FENTANYL CITRATE 25 MCG: 50 INJECTION, SOLUTION INTRAMUSCULAR; INTRAVENOUS at 21:58

## 2025-02-25 RX ADMIN — TOPIRAMATE 100 MG: 100 TABLET, FILM COATED ORAL at 08:41

## 2025-02-25 RX ADMIN — ROCURONIUM BROMIDE 10 MG: 10 INJECTION, SOLUTION INTRAVENOUS at 20:05

## 2025-02-25 RX ADMIN — ASPIRIN 81 MG: 81 TABLET, COATED ORAL at 08:41

## 2025-02-25 RX ADMIN — MIDAZOLAM HYDROCHLORIDE 1 MG: 1 INJECTION, SOLUTION INTRAMUSCULAR; INTRAVENOUS at 19:05

## 2025-02-25 RX ADMIN — ROCURONIUM BROMIDE 10 MG: 10 INJECTION, SOLUTION INTRAVENOUS at 20:36

## 2025-02-25 RX ADMIN — LEVOTHYROXINE SODIUM 137 MCG: 0.03 TABLET ORAL at 06:43

## 2025-02-25 RX ADMIN — ROCURONIUM BROMIDE 40 MG: 10 INJECTION, SOLUTION INTRAVENOUS at 19:44

## 2025-02-25 RX ADMIN — CETIRIZINE HYDROCHLORIDE 10 MG: 10 TABLET, FILM COATED ORAL at 08:41

## 2025-02-25 RX ADMIN — SODIUM CHLORIDE, POTASSIUM CHLORIDE, SODIUM LACTATE AND CALCIUM CHLORIDE: 600; 310; 30; 20 INJECTION, SOLUTION INTRAVENOUS at 19:37

## 2025-02-25 RX ADMIN — POTASSIUM CHLORIDE AND SODIUM CHLORIDE 125 ML/HR: 900; 150 INJECTION, SOLUTION INTRAVENOUS at 06:43

## 2025-02-25 RX ADMIN — HYDROMORPHONE HYDROCHLORIDE 0.25 MG: 1 INJECTION, SOLUTION INTRAMUSCULAR; INTRAVENOUS; SUBCUTANEOUS at 20:10

## 2025-02-25 RX ADMIN — SODIUM CHLORIDE, POTASSIUM CHLORIDE, SODIUM LACTATE AND CALCIUM CHLORIDE 100 ML/HR: 600; 310; 30; 20 INJECTION, SOLUTION INTRAVENOUS at 08:52

## 2025-02-25 RX ADMIN — HYDROMORPHONE HYDROCHLORIDE 0.25 MG: 1 INJECTION, SOLUTION INTRAMUSCULAR; INTRAVENOUS; SUBCUTANEOUS at 20:00

## 2025-02-25 RX ADMIN — FENTANYL CITRATE 50 MCG: 50 INJECTION, SOLUTION INTRAMUSCULAR; INTRAVENOUS at 19:44

## 2025-02-25 RX ADMIN — FAMOTIDINE 20 MG: 10 INJECTION INTRAVENOUS at 19:19

## 2025-02-25 RX ADMIN — CEFTRIAXONE 2000 MG: 2 INJECTION, POWDER, FOR SOLUTION INTRAMUSCULAR; INTRAVENOUS at 15:05

## 2025-02-25 RX ADMIN — KETOROLAC TROMETHAMINE 30 MG: 30 INJECTION, SOLUTION INTRAMUSCULAR at 20:00

## 2025-02-25 RX ADMIN — POTASSIUM CHLORIDE 40 MEQ: 1500 TABLET, EXTENDED RELEASE ORAL at 13:46

## 2025-02-25 RX ADMIN — ONDANSETRON 4 MG: 2 INJECTION, SOLUTION INTRAMUSCULAR; INTRAVENOUS at 20:50

## 2025-02-25 RX ADMIN — Medication 10 ML: at 08:41

## 2025-02-25 RX ADMIN — PROPOFOL 140 MG: 10 INJECTION, EMULSION INTRAVENOUS at 19:43

## 2025-02-25 RX ADMIN — DEXAMETHASONE SODIUM PHOSPHATE 10 MG: 4 INJECTION, SOLUTION INTRAMUSCULAR; INTRAVENOUS at 19:53

## 2025-02-25 RX ADMIN — SUGAMMADEX 200 MG: 100 INJECTION, SOLUTION INTRAVENOUS at 21:58

## 2025-02-25 RX ADMIN — FENTANYL CITRATE 25 MCG: 50 INJECTION, SOLUTION INTRAMUSCULAR; INTRAVENOUS at 22:01

## 2025-02-25 RX ADMIN — LIDOCAINE HYDROCHLORIDE 60 MG: 20 INJECTION, SOLUTION INFILTRATION; PERINEURAL at 19:43

## 2025-02-25 RX ADMIN — POTASSIUM CHLORIDE 40 MEQ: 1500 TABLET, EXTENDED RELEASE ORAL at 08:41

## 2025-02-25 NOTE — PROGRESS NOTES
Ded[facility] to Hospital Care    [phone]   LOS: 1 day     Name: Laura Cuenca  Age/Sex: 44 y.o. female  :  1980        PCP: Georgiana Hammonds MD  Chief Complaint   Patient presents with    Weakness - Generalized    Nausea      Subjective   No nausea or vomiting and feeling somewhat better this morning.  Still with abdominal discomfort.  No fevers or chills.  She is eager to take shower.  Saúl for surgery this afternoon.  Discussed on multidisciplinary rounds  General: No Fever or Chills, Cardiac: No Chest Pain or Palpitations, Resp: No Cough or SOA, and Other: No bleeding    amitriptyline, 50 mg, Oral, Nightly  aspirin, 81 mg, Oral, Daily  Atogepant, 60 mg, Oral, Daily  atorvastatin, 10 mg, Oral, Nightly  cefTRIAXone, 2,000 mg, Intravenous, Q24H  cetirizine, 10 mg, Oral, Daily  levothyroxine, 137 mcg, Oral, Q AM  sodium chloride, 10 mL, Intravenous, Q12H  topiramate, 100 mg, Oral, BID      sodium chloride 0.9 % with KCl 20 mEq, 125 mL/hr, Last Rate: 125 mL/hr (25 0643)        Objective   Vital Signs  Temp:  [97 °F (36.1 °C)-98.6 °F (37 °C)] 98.6 °F (37 °C)  Heart Rate:  [] 116  Resp:  [16-18] 16  BP: (109-153)/() 109/80  Body mass index is 26.56 kg/m².    Intake/Output Summary (Last 24 hours) at 2025 0701  Last data filed at 2025 0643  Gross per 24 hour   Intake 2690.83 ml   Output --   Net 2690.83 ml       Physical Exam  Vitals reviewed.   Constitutional:       General: She is not in acute distress.     Appearance: She is ill-appearing.   Cardiovascular:      Rate and Rhythm: Normal rate and regular rhythm.   Pulmonary:      Effort: Pulmonary effort is normal. No respiratory distress.   Neurological:      Mental Status: She is alert and oriented to person, place, and time. Mental status is at baseline.           Results Review:       I reviewed the patient's new clinical results.  Results from last 7 days   Lab Units 25  0323 25  0936   WBC 10*3/mm3 8.52 11.38*    HEMOGLOBIN g/dL 12.7 15.1   PLATELETS 10*3/mm3 274 294     Results from last 7 days   Lab Units 02/25/25  0323 02/24/25  0936   SODIUM mmol/L 135* 128*   POTASSIUM mmol/L 3.5 3.1*   CHLORIDE mmol/L 107 95*   CO2 mmol/L 16.0* 21.0*   BUN mg/dL 9 12   CREATININE mg/dL 0.53* 0.72   CALCIUM mg/dL 8.3* 9.6   MAGNESIUM mg/dL 2.0  --    Estimated Creatinine Clearance: 112.4 mL/min (A) (by C-G formula based on SCr of 0.53 mg/dL (L)).      Assessment & Plan   Active Hospital Problems    Diagnosis  POA    **Nausea & vomiting [R11.2]  Yes    Abnormal urinalysis [R82.90]  Unknown    Gall stones [K80.20]  Unknown    Lung infiltrate on CT [R91.8]  Unknown    Tachycardia [R00.0]  Unknown    Hypothyroidism [E03.9]  Unknown    Meningioma [D32.9]  Unknown    Hyperlipidemia [E78.5]  Unknown    Hyponatremia [E87.1]  Unknown    Cholelithiasis and acute cholecystitis without obstruction [K80.00]  Unknown      Resolved Hospital Problems   No resolved problems to display.       PLAN  This is a 44-year-old lady with a history of hypothyroidism hyperlipidemia and other medical comorbidities who presents to the hospital with fever, nausea and vomiting, and abdominal discomfort and is found to have hyponatremia leukocytosis and unclear etiology for her symptoms with many nonspecific findings on imaging  -Kind of an odd  presentation.  But I agree with general surgery that the combination of nausea vomiting elevated LFTs would probably recommend proceeding with laparoscopic cholecystectomy and monitoring her symptoms.  -Continue on Rocephin dosed for sepsis and intra-abdominal infection  -Hyponatremia has significantly improved with hydration.  Will switch to lactated Ringer's given the progression in her acidosis and add a lactic acid to this morning's labs.  -Given the hyponatremia and acidosis we will check a TSH and free T4 but given improvement with hydration would argue against decompensated thyroid disease.  -LFTs are overall stable  although the ALT did rise in the AST did rise as well.  -Her urinalysis I do not think represents an active infection but she is on antibiotics that would cover for this.  She has been both squamous epithelials and mucus present.  She is also noted to have pyuria and bacteria.  My suspicion is this this is likely not a clean-catch.  It is also noted she has ketones and blood in her urine consistent with mild starvation ketoacidosis.  -With the nausea and vomiting she could have infiltrate seen on CT scan without actual pneumonia may represent just pneumonitis.  Will add procalcitonin  -Mechanical DVT prophylaxis  -Full code      Disposition  Expected discharge date/ time has not been documented.       Grey Dey MD  [facility]  02/25/25  07:01 EST

## 2025-02-25 NOTE — ASSESSMENT & PLAN NOTE
Follow with NSG/ophthalmology.  Possible recurrence on most recent MRI.  Plan to repeat in 1yr and discuss further management if continues to enlarge.

## 2025-02-25 NOTE — ASSESSMENT & PLAN NOTE
Suppressed TSH with menstrual irregularity and incr anxiety.  Reduce dose to levothyroxine 137mcg daily.  Discussed taking med first thing in the AM, on empty stomach with no other meds/supplements.  Recheck in 6 weeks.

## 2025-02-25 NOTE — ASSESSMENT & PLAN NOTE
Stable. Following with Neuro.  Current regimen: amitriptyline 50mg daily, Atogepant, topamax 100mg daily, zomig PRN  Continue mgmt per Neuro.

## 2025-02-25 NOTE — PLAN OF CARE
"Goal Outcome Evaluation:  Plan of Care Reviewed With: patient        Progress: no change  Outcome Evaluation: vss. c/o pain but does not want pain medication. states pain is \"bearable\". ivf. npo.  plan for possible lap akhil.                             "

## 2025-02-25 NOTE — ANESTHESIA PREPROCEDURE EVALUATION
" Anesthesia Evaluation     Patient summary reviewed and Nursing notes reviewed   no history of anesthetic complications:   NPO Solid Status: > 8 hours  NPO Liquid Status: > 2 hours           Airway   Mallampati: II  TM distance: >3 FB  No difficulty expected  Dental    (+) implants        Pulmonary - normal exam   (+) pneumonia (\"asymptomatic - infiltrate on CXR, on ABx)\") stable , a smoker Former,  (-) COPD, asthma  Cardiovascular   Exercise tolerance: good (4-7 METS)    ECG reviewed  Rhythm: regular    (+) hypertension, hyperlipidemia  (-) past MI, angina, cardiac stents      Neuro/Psych  (+) headaches  (-) seizures, CVA    ROS Comment: meningioma  GI/Hepatic/Renal/Endo    (+) thyroid problem hypothyroidism  (-) GERD, liver disease, no renal disease    Musculoskeletal     Abdominal    Substance History - negative use     OB/GYN          Other - negative ROS                         Anesthesia Plan    ASA 2     general     (I have reviewed the patient's history and chart with the patient, including all pertinent laboratory results and imaging. I have explained the risks of anesthesia including but not limited to dental or airway injury, nausea, cardio-pulmonary complications, such as aspiration, MI, stroke, or death.     VITALS:  /90 (BP Location: Left arm, Patient Position: Lying)   Pulse 96   Temp 36.7 °C (98.1 °F) (Oral)   Resp 16   Ht 152.4 cm (60\")   Wt 61.7 kg (136 lb)   LMP 02/18/2025 (Within Days)   SpO2 95%   BMI 26.56 kg/m² )  intravenous induction     Anesthetic plan, risks, benefits, and alternatives have been provided, discussed and informed consent has been obtained with: patient.  Pre-procedure education provided      CODE STATUS:    Code Status (Patient has no pulse and is not breathing): CPR (Attempt to Resuscitate)  Medical Interventions (Patient has pulse or is breathing): Full Support      "

## 2025-02-26 ENCOUNTER — READMISSION MANAGEMENT (OUTPATIENT)
Dept: CALL CENTER | Facility: HOSPITAL | Age: 45
End: 2025-02-26
Payer: COMMERCIAL

## 2025-02-26 VITALS
WEIGHT: 136 LBS | DIASTOLIC BLOOD PRESSURE: 87 MMHG | OXYGEN SATURATION: 94 % | HEIGHT: 60 IN | HEART RATE: 79 BPM | TEMPERATURE: 97.5 F | RESPIRATION RATE: 18 BRPM | BODY MASS INDEX: 26.7 KG/M2 | SYSTOLIC BLOOD PRESSURE: 120 MMHG

## 2025-02-26 LAB
ALBUMIN SERPL-MCNC: 2.8 G/DL (ref 3.5–5.2)
ALBUMIN/GLOB SERPL: 0.6 G/DL
ALP SERPL-CCNC: 160 U/L (ref 39–117)
ALT SERPL W P-5'-P-CCNC: 248 U/L (ref 1–33)
ANION GAP SERPL CALCULATED.3IONS-SCNC: 16 MMOL/L (ref 5–15)
ANISOCYTOSIS BLD QL: ABNORMAL
AST SERPL-CCNC: 278 U/L (ref 1–32)
BACTERIA SPEC AEROBE CULT: NO GROWTH
BASOPHILS # BLD MANUAL: 0 10*3/MM3 (ref 0–0.2)
BASOPHILS NFR BLD MANUAL: 0 % (ref 0–1.5)
BILIRUB SERPL-MCNC: <0.2 MG/DL (ref 0–1.2)
BUN SERPL-MCNC: 11 MG/DL (ref 6–20)
BUN/CREAT SERPL: 19.6 (ref 7–25)
CALCIUM SPEC-SCNC: 8.8 MG/DL (ref 8.6–10.5)
CHLORIDE SERPL-SCNC: 108 MMOL/L (ref 98–107)
CO2 SERPL-SCNC: 15 MMOL/L (ref 22–29)
CREAT SERPL-MCNC: 0.56 MG/DL (ref 0.57–1)
DEPRECATED RDW RBC AUTO: 43.7 FL (ref 37–54)
EGFRCR SERPLBLD CKD-EPI 2021: 116.3 ML/MIN/1.73
EOSINOPHIL # BLD MANUAL: 0 10*3/MM3 (ref 0–0.4)
EOSINOPHIL NFR BLD MANUAL: 0 % (ref 0.3–6.2)
ERYTHROCYTE [DISTWIDTH] IN BLOOD BY AUTOMATED COUNT: 12.8 % (ref 12.3–15.4)
GLOBULIN UR ELPH-MCNC: 4.6 GM/DL
GLUCOSE SERPL-MCNC: 146 MG/DL (ref 65–99)
HCT VFR BLD AUTO: 40.7 % (ref 34–46.6)
HGB BLD-MCNC: 13.1 G/DL (ref 12–15.9)
LYMPHOCYTES # BLD MANUAL: 0.63 10*3/MM3 (ref 0.7–3.1)
LYMPHOCYTES NFR BLD MANUAL: 5.3 % (ref 5–12)
MAGNESIUM SERPL-MCNC: 2.2 MG/DL (ref 1.6–2.6)
MCH RBC QN AUTO: 29.8 PG (ref 26.6–33)
MCHC RBC AUTO-ENTMCNC: 32.2 G/DL (ref 31.5–35.7)
MCV RBC AUTO: 92.7 FL (ref 79–97)
MONOCYTES # BLD: 0.52 10*3/MM3 (ref 0.1–0.9)
NEUTROPHILS # BLD AUTO: 8.63 10*3/MM3 (ref 1.7–7)
NEUTROPHILS NFR BLD MANUAL: 88.3 % (ref 42.7–76)
NRBC BLD AUTO-RTO: 0 /100 WBC (ref 0–0.2)
PLAT MORPH BLD: NORMAL
PLATELET # BLD AUTO: 339 10*3/MM3 (ref 140–450)
PMV BLD AUTO: 10.9 FL (ref 6–12)
POIKILOCYTOSIS BLD QL SMEAR: ABNORMAL
POTASSIUM SERPL-SCNC: 4 MMOL/L (ref 3.5–5.2)
PROT SERPL-MCNC: 7.4 G/DL (ref 6–8.5)
RBC # BLD AUTO: 4.39 10*6/MM3 (ref 3.77–5.28)
SODIUM SERPL-SCNC: 139 MMOL/L (ref 136–145)
VARIANT LYMPHS NFR BLD MANUAL: 6.4 % (ref 19.6–45.3)
WBC MORPH BLD: NORMAL
WBC NRBC COR # BLD AUTO: 9.77 10*3/MM3 (ref 3.4–10.8)

## 2025-02-26 PROCEDURE — 83735 ASSAY OF MAGNESIUM: CPT | Performed by: STUDENT IN AN ORGANIZED HEALTH CARE EDUCATION/TRAINING PROGRAM

## 2025-02-26 PROCEDURE — G0378 HOSPITAL OBSERVATION PER HR: HCPCS

## 2025-02-26 PROCEDURE — 99024 POSTOP FOLLOW-UP VISIT: CPT

## 2025-02-26 PROCEDURE — 80053 COMPREHEN METABOLIC PANEL: CPT | Performed by: STUDENT IN AN ORGANIZED HEALTH CARE EDUCATION/TRAINING PROGRAM

## 2025-02-26 PROCEDURE — 25810000003 LACTATED RINGERS PER 1000 ML: Performed by: HOSPITALIST

## 2025-02-26 PROCEDURE — 85007 BL SMEAR W/DIFF WBC COUNT: CPT | Performed by: HOSPITALIST

## 2025-02-26 PROCEDURE — 85025 COMPLETE CBC W/AUTO DIFF WBC: CPT | Performed by: HOSPITALIST

## 2025-02-26 RX ORDER — LEVOTHYROXINE SODIUM 137 UG/1
137 TABLET ORAL
Start: 2025-02-26

## 2025-02-26 RX ORDER — HYDROCODONE BITARTRATE AND ACETAMINOPHEN 5; 325 MG/1; MG/1
1 TABLET ORAL EVERY 6 HOURS PRN
Qty: 12 TABLET | Refills: 0 | Status: SHIPPED | OUTPATIENT
Start: 2025-02-26 | End: 2025-03-01

## 2025-02-26 RX ORDER — ONDANSETRON 4 MG/1
4 TABLET, ORALLY DISINTEGRATING ORAL EVERY 8 HOURS PRN
Qty: 9 TABLET | Refills: 0 | Status: SHIPPED | OUTPATIENT
Start: 2025-02-26 | End: 2025-03-01

## 2025-02-26 RX ADMIN — CETIRIZINE HYDROCHLORIDE 10 MG: 10 TABLET, FILM COATED ORAL at 08:55

## 2025-02-26 RX ADMIN — HYDROCODONE BITARTRATE AND ACETAMINOPHEN 1 TABLET: 5; 325 TABLET ORAL at 06:12

## 2025-02-26 RX ADMIN — LEVOTHYROXINE SODIUM 137 MCG: 0.03 TABLET ORAL at 06:13

## 2025-02-26 RX ADMIN — AMITRIPTYLINE HYDROCHLORIDE 50 MG: 50 TABLET, FILM COATED ORAL at 00:34

## 2025-02-26 RX ADMIN — Medication 10 ML: at 08:55

## 2025-02-26 RX ADMIN — ATORVASTATIN CALCIUM 10 MG: 10 TABLET ORAL at 00:35

## 2025-02-26 RX ADMIN — TOPIRAMATE 100 MG: 100 TABLET, FILM COATED ORAL at 08:55

## 2025-02-26 RX ADMIN — HYDROCODONE BITARTRATE AND ACETAMINOPHEN 1 TABLET: 5; 325 TABLET ORAL at 00:31

## 2025-02-26 RX ADMIN — ASPIRIN 81 MG: 81 TABLET, COATED ORAL at 08:55

## 2025-02-26 RX ADMIN — SODIUM CHLORIDE, POTASSIUM CHLORIDE, SODIUM LACTATE AND CALCIUM CHLORIDE 100 ML/HR: 600; 310; 30; 20 INJECTION, SOLUTION INTRAVENOUS at 00:36

## 2025-02-26 RX ADMIN — TOPIRAMATE 100 MG: 100 TABLET, FILM COATED ORAL at 00:32

## 2025-02-26 NOTE — OP NOTE
Operative Note :  Donna Beck MD    Patient Name and :  Laura Cuenca  1980    Procedure Date:   25    Pre-op Diagnosis:  Cholelithiasis and acute cholecystitis without obstruction [K80.00]    Post-Operative Diagnosis:  Same as above     Procedure:   Laparoscopic cholecystectomy with intraoperative cholangiogram    Surgeon:   Donna Beck MD    Assistant:   Assistant: Raffaele Lino CSA was responsible for performing the following activities: Retraction, Suturing, Closing, Placing Dressing, and Held/Positioned Camera and their skilled assistance was necessary for the success of this case.    Anesthesia:    General (general endotracheal tube)    Estimated Blood Loss:   75cc    Specimens:   Gallbladder and stones    Complications:   none    Indications:  Patient is a 44-year-old lady who presented to the hospital with a 5-day history of fatigue, nausea, vomiting, generalized weakness, and back pain, found on workup to have cholelithiasis and concern for cholecystitis with elevated LFTs.  She was recommended to undergo laparoscopic possible open cholecystectomy with intraoperative cholangiogram.  Informed consent was obtained.    Findings:   Severely inflamed gallbladder with wall thickening and dense adhesions, containing multiple large yellow stones  Dissection performed to achieve a critical view prior to intraoperative cholangiogram, however due to severe inflammation around the cystic duct, I elected to clip and cut the cystic artery prior to obtaining a critical view to allow visualization of the duct and further dissection  Intraoperative cholangiogram performed demonstrating flow of contrast into the cystic duct, common bile duct, bilateral hepatic ducts, and duodenum without filling defects or delays  Multiple spilled large stones retrieved     Description of procedure:  After informed consent was obtained, the patient was brought to the operating room and placed supine on the  operating table. General anesthesia was induced. The abdomen was prepped and draped in usual sterile fashion.  A timeout was performed.      The abdomen was entered using a direct optical view technique with a 5 mm trocar in the right upper quadrant.  Local anesthetic was injected and the 11 blade was used to make a 5 mm incision through which the trocar was advanced.  All layers of the abdominal wall were visualized as the trocar was advanced into the peritoneal cavity.  The abdomen was insufflated and inspected and there was no evidence of injury from entry.  Subsequently a 12 mm subxiphoid port and two 5 mm right subcostal ports were placed under direct visualization following local anesthetic injection.    I attempted to decompress the gallbladder with the aspirator needle, but there was minimal hydropic fluid noted and the gallbladder was clearly distended with stones and had an indurated wall.  The gallbladder was able to be grasped with an atraumatic grasper and lifted over the dome of the liver.  The infudibular dissection was performed meticulously using a combination of [place] graspers, blunt dissection with suction , lap kitner, and bovie hook electrocautery. The dissection was performed to achieve a critical view prior to intraoperative cholangiogram, however due to severe inflammation preventing adequate visualization of the cystic duct, I elected to clip and cut the cystic artery prior to obtaining a critical view to allow visualization of the duct and further dissection around the duct. A right angle dissector was used to get behind the duct and isolate it, and an IOC was able to be performed.      The infundibulum was clipped with a large clip and the scissors used to partially transect the cystic duct.  A tiny amount of bile was noted to be flowing from the cystic duct.  The cholangiocatheter was advanced through the abdominal wall and into the cystic duct and clipped in place with a  second large clip. An intraoperative cholangiogram was performed and demonstrated adequate filling of the bilateral hepatic ducts, common bile duct, and duodenum without filling defects.  There was a tiny cystic duct noted but this was likely due to the depth of the cholangiocatheter, and we were confident about the catheter being in the cystic duct based on our results.     Following cholangiogram, the cystic duct was clipped and divided with scissors.  Then the gallbladder was removed from the cystic plate using Bovie electrocautery.  It was placed within an Endo Catch bag and removed from the abdomen via the subxiphoid incision and sent to pathology as specimen.  Multiple large stones that had spilled were collected and placed in another endocatch bag and sent along with the gallbladder. The liver bed was inspected and hemostasis achieved with bovie and 3 g jayden powder was applied. The clips appeared in good position.  Suction  was used to suction bile and blood from the field.  The ports were removed under direct visualization.  The subxiphoid port site was closed with 0 Vicryl and then all skin incisions were closed using 4-0 Monocryl.  The operative field was cleaned and dried and exofin dressing placed.  All counts were correct at the end of the case.  The patient was awoken and transferred to PACU in stable condition.    Donna Beck MD  General Surgery  [facility]    [address], [address]  [place] [address]  P: [phone]  F: [phone]

## 2025-02-26 NOTE — ANESTHESIA PROCEDURE NOTES
Airway  Urgency: elective    Date/Time: 2/25/2025 7:46 PM  Airway not difficult    General Information and Staff    Patient location during procedure: OR  Anesthesiologist: Jim Kulkarni MD    Indications and Patient Condition  Indications for airway management: airway protection    Preoxygenated: yes  Mask difficulty assessment: 1 - vent by mask    Final Airway Details  Final airway type: endotracheal airway      Successful airway: ETT  Cuffed: yes   Successful intubation technique: direct laryngoscopy  Facilitating devices/methods: intubating stylet and anterior pressure/BURP  Endotracheal tube insertion site: oral  Blade: CMAC  Blade size: D  Cormack-Lehane Classification: grade I - full view of glottis  Placement verified by: chest auscultation   Cuff volume (mL): 7  Measured from: teeth  ETT/EBT  to teeth (cm): 22  Number of attempts at approach: 1  Assessment: lips, teeth, and gum same as pre-op and atraumatic intubation    Additional Comments  Small mouth opening despite use of paralytic

## 2025-02-26 NOTE — CASE MANAGEMENT/SOCIAL WORK
Case Management Discharge Note      Final Note: Home, no additional CCP needs.         Selected Continued Care - Admitted Since 2/24/2025       Destination    No services have been selected for the patient.                Durable Medical Equipment    No services have been selected for the patient.                Dialysis/Infusion    No services have been selected for the patient.                Home Medical Care    No services have been selected for the patient.                Therapy    No services have been selected for the patient.                Community Resources    No services have been selected for the patient.                Community & DME    No services have been selected for the patient.                    Selected Continued Care - Episodes Includes continued care and service providers with selected services from the active episodes listed below      Chronic Migraine Episode start date: 2/5/2024   There are no active outsourced providers for this episode.                      Final Discharge Disposition Code: 01 - home or self-care

## 2025-02-26 NOTE — OUTREACH NOTE
Prep Survey      Flowsheet Row Responses   [facility] patient discharged from? [place]   Is LACE score < 7 ? Yes   Eligibility [facility]   Date of Admission 02/24/25   Date of Discharge 02/26/25   Discharge Disposition Home or Self Care   Discharge diagnosis Nausea & vomiting  [CHOLECYSTECTOMY LAPAROSCOPIC]   Does the patient have one of the following disease processes/diagnoses(primary or secondary)? General Surgery   Does the patient have Home health ordered? No   Is there a DME ordered? No   Comments regarding appointments Discharge Follow-up with Specified Provider: Dr Beck,  2 Weeks   Medication alerts for this patient see AVS   Prep survey completed? Yes            Gardenia MARLOW - Registered Nurse              Gardenia MARLOW - Registered Nurse

## 2025-02-26 NOTE — PLAN OF CARE
Goal Outcome Evaluation:           Progress: no change  Outcome Evaluation: pt a/o x4. room air with stable vital signs.lap eli done. prn pain meds administered. pt ambulated to the bathroom during shift. safety precautions maintained.

## 2025-02-26 NOTE — DISCHARGE SUMMARY
Patient Name: Laura Cuenca  : 1980  MRN: [id number]    Date of Admission: 2025  Date of Discharge:  2025  Primary Care Physician: Georgiana Hammonds MD      Chief Complaint:   Weakness - Generalized and Nausea      Discharge Diagnoses     Active Hospital Problems    Diagnosis  POA    **Nausea & vomiting [R11.2]  Yes    Cholelithiasis with acute cholecystitis [K80.00]  Yes    Abnormal urinalysis [R82.90]  Unknown    Gall stones [K80.20]  Unknown    Lung infiltrate on CT [R91.8]  Unknown    Tachycardia [R00.0]  Unknown    Hypothyroidism [E03.9]  Unknown    Meningioma [D32.9]  Unknown    Hyperlipidemia [E78.5]  Unknown    Hyponatremia [E87.1]  Unknown    Cholelithiasis and acute cholecystitis without obstruction [K80.00]  Unknown      Resolved Hospital Problems   No resolved problems to display.        Hospital Course     Ms. Cuenca is a 44 y.o. female with a history of hypothyroidism hyperlipidemia who presented to [facility] initially complaining of nausea vomiting weakness not feeling well.  Please see the admitting history and physical for further details.  She was found to have symptomatic cholelithiasis and was admitted to the hospital for further evaluation and treatment.  She is admitted to the hospital over concern for possible symptomatic cholelithiasis versus pneumonia.  Given her elevated LFTs and symptoms it was more likely this represented cholelithiasis.  She was seen by general surgery and taken to the operating room for laparoscopic cholecystectomy.  She tolerated the procedure well and the following day she is tolerating a diet and pain is controlled.  Her symptoms are back to baseline.  At this point she stable to discharge home and follow-up with her general surgeon and primary care physician in the outpatient settings.  The plan was discussed with her and her significant other at the bedside all questions been addressed and answered she stable to discharge home  today        Day of Discharge     Subjective:  No diarrhea today.  Abdominal pain is controlled with medications tolerating regular diet no fevers or chills overnight.    Physical Exam:  Temp:  [97.2 °F (36.2 °C)-98.3 °F (36.8 °C)] 97.5 °F (36.4 °C)  Heart Rate:  [] 79  Resp:  [16-22] 18  BP: (114-130)/(76-90) 120/87  Body mass index is 26.56 kg/m².  Physical Exam  Vitals reviewed.   Constitutional:       General: She is not in acute distress.     Appearance: She is ill-appearing.   Cardiovascular:      Rate and Rhythm: Normal rate and regular rhythm.   Pulmonary:      Effort: Pulmonary effort is normal. No respiratory distress.   Abdominal:      General: Bowel sounds are normal. There is no distension.      Palpations: Abdomen is soft.      Tenderness: There is abdominal tenderness.      Comments: Noted expected postoperative tenderness incisions look good   Neurological:      Mental Status: She is alert and oriented to person, place, and time. Mental status is at baseline.         Consultants     Consult Orders (all) (From admission, onward)       Start     Ordered    02/24/25 1713  Inpatient Case Management  Consult  Once        Provider:  (Not yet assigned)    02/24/25 1713    02/24/25 1356  [facility] (on-call MD unless specified) Details  Once        Specialty:  Hospitalist  Provider:  Braeden Salamanca MD    02/24/25 1355    02/24/25 1327  Surgery (on-call MD unless specified)  Once        Specialty:  General Surgery  Provider:  Donna Beck MD    02/24/25 1326                  Procedures     CHOLECYSTECTOMY LAPAROSCOPIC INTRAOPERATIVE CHOLANGIOGRAM    Imaging Results (All)       Procedure Component Value Units Date/Time    FL Cholangiogram Operative [[id number]] Collected: 02/26/25 0840     Updated: 02/26/25 1047    Narrative:      INTRAOPERATIVE CHOLANGIOGRAM 02/25/2025     HISTORY: Laparoscopic cholecystectomy with possible stones.     Only 1 image is submitted. Contrast is seen in the  intrahepatic, common  hepatic and common bile ducts. No bile duct stones, strictures or  biliary dilatation is seen. There is some reflux into the pancreatic  duct. There is free spill of contrast into the duodenum.     FLUOROSCOPY TIME: 13 seconds, 1.9 mGy, 1 image.     This report was finalized on 2/26/2025 10:44 AM by Dr. Brian Srivastava M.D on Workstation: [id number]       US Gallbladder [[id number]] Collected: 02/24/25 1315     Updated: 02/24/25 1321    Narrative:      US GALLBLADDER-     INDICATION: Transaminitis     COMPARISON: None     TECHNIQUE: Grayscale and color Doppler evaluation of the right upper  quadrant with spectral Doppler evaluation of the main portal vein     FINDINGS:      Pancreas: Pancreatic head is unremarkable. Pancreatic neck, body and  tail are obscured by bowel gas artifact.     Liver: Normal echotexture and echogenicity. No liver mass seen. Main  portal vein is patent with normal blood flow direction and waveform.     Gallbladder: Wall echo shadow complex indicating gallbladder full of  gallstones. No wall thickening. Negative reported sonographic Gunter  sign.     Biliary system: No biliary ductal dilatation. Common bile duct measures  4 mm.     Right kidney: Measures 10.9 x 5.4 x 4.2 cm. No renal mass seen. No  hydronephrosis.       Impression:      Wall echo shadow complex indicating gallbladder full of gallstones. No  wall thickening or reported sonographic Gunter sign.     This report was finalized on 2/24/2025 1:18 PM by Dr. Rod Madrid M.D on Workstation: [id number]       XR Chest 1 View [[id number]] Collected: 02/24/25 1002     Updated: 02/24/25 1007    Narrative:      XR CHEST 1 VW-        INDICATION: Body aches     COMPARISON: None     TECHNIQUE: 1 view chest     FINDINGS:      Ill-defined left lower lung opacity, with obscuration of the left  diaphragm silhouette. Blunting left costophrenic angle. Normal  mediastinal contour. Heart is normal in size. Mild reversed  "S-shaped  curvature to the thoracic spine.       Impression:         1. Left lower lung opacities concerning for pneumonia. Recommend  follow-up to resolution.  2. Potential small left pleural effusion.     This report was finalized on 2/24/2025 10:04 AM by Dr. Rod Madrid M.D on Workstation: [id number]                 Pertinent Labs     Results from last 7 days   Lab Units 02/26/25  0350 02/25/25  0323 02/24/25  0936   WBC 10*3/mm3 9.77 8.52 11.38*   HEMOGLOBIN g/dL 13.1 12.7 15.1   PLATELETS 10*3/mm3 339 274 294     Results from last 7 days   Lab Units 02/26/25  0350 02/25/25  1731 02/25/25  0323 02/24/25  0936   SODIUM mmol/L 139  --  135* 128*   POTASSIUM mmol/L 4.0 3.9 3.5 3.1*   CHLORIDE mmol/L 108*  --  107 95*   CO2 mmol/L 15.0*  --  16.0* 21.0*   BUN mg/dL 11  --  9 12   CREATININE mg/dL 0.56*  --  0.53* 0.72   GLUCOSE mg/dL 146*  --  88 129*   EGFR mL/min/1.73 116.3  --  117.9 106.5     Results from last 7 days   Lab Units 02/26/25  0350 02/25/25  0323 02/24/25  0936   ALBUMIN g/dL 2.8* 2.9* 3.5   BILIRUBIN mg/dL <0.2 0.2 0.3   ALK PHOS U/L 160* 143* 175*   AST (SGOT) U/L 278* 272* 210*   ALT (SGPT) U/L 248* 202* 165*     Results from last 7 days   Lab Units 02/26/25  0350 02/25/25  0323 02/24/25  0936   CALCIUM mg/dL 8.8 8.3* 9.6   ALBUMIN g/dL 2.8* 2.9* 3.5   MAGNESIUM mg/dL 2.2 2.0  --        Results from last 7 days   Lab Units 02/24/25  1051 02/24/25  0936   HSTROP T ng/L 15* 17*           Invalid input(s): \"LDLCALC\"  Results from last 7 days   Lab Units 02/24/25  1458 02/24/25  1450 02/24/25  1009   BLOODCX  No growth at 24 hours No growth at 24 hours  --    URINECX   --   --  No growth     Results from last 7 days   Lab Units 02/24/25  0936   COVID19  Not Detected       Test Results Pending at Discharge     Pending Results       Procedure [Order ID] Specimen - Date/Time    Tissue Pathology Exam [[id number]] Collected: 02/25/25 2120    Specimen: Tissue from Gallbladder Updated: 02/26/25 1005 "              Discharge Details        Discharge Medications        New Medications        Instructions Start Date   HYDROcodone-acetaminophen 5-325 MG per tablet  Commonly known as: NORCO   1 tablet, Oral, Every 6 Hours PRN      ondansetron ODT 4 MG disintegrating tablet  Commonly known as: ZOFRAN-ODT   4 mg, Translingual, Every 8 Hours PRN             Changes to Medications        Instructions Start Date   ZOLMitriptan 5 MG disintegrating tablet  Commonly known as: ZOMIG-ZMT  What changed:   how much to take  how to take this  when to take this  reasons to take this   Take one tablet by mouth as needed for migraine. May repeat in 2 hours if needed. Do not exceed 2 tablets in 24 hours.             Continue These Medications        Instructions Start Date   amitriptyline 25 MG tablet  Commonly known as: ELAVIL   50 mg, Oral, Nightly      aspirin 81 MG EC tablet   81 mg, Daily      atorvastatin 10 MG tablet  Commonly known as: LIPITOR   10 mg, Oral, Nightly      fexofenadine 180 MG tablet  Commonly known as: ALLEGRA   180 mg, Daily      Ibuprofen 3 %, Gabapentin 10 %, Baclofen 2 %, lidocaine 4 %   1-2 g, Topical, 3 to 4 Times Daily      levothyroxine 137 MCG tablet  Commonly known as: SYNTHROID, LEVOTHROID   137 mcg, Oral, Every Early Morning      Qulipta 60 MG tablet  Generic drug: Atogepant   60 mg, Oral, Daily      topiramate 100 MG tablet  Commonly known as: Topamax   100 mg, Oral, 2 Times Daily               Allergies   Allergen Reactions    Egg White [Egg White (Egg Protein)] Other (See Comments)     GIVES HER GAS    Pollen Extract Unknown - Low Severity    Wheat Unknown - Low Severity    Percocet [Oxycodone-Acetaminophen] Rash       Discharge Disposition:  Home or Self Care      Discharge Diet:  Diet Order   Procedures    Diet: Regular/House; Fluid Consistency: Thin (IDDSI 0)       Discharge Activity:   Activity Instructions       Activity as Tolerated              CODE STATUS:    Code Status and Medical  Interventions: CPR (Attempt to Resuscitate); Full Support   Ordered at: 02/24/25 1727     Code Status (Patient has no pulse and is not breathing):    CPR (Attempt to Resuscitate)     Medical Interventions (Patient has pulse or is breathing):    Full Support       Future Appointments   Date Time Provider Department Center   3/3/2025 10:00 AM Katarina Topete MD [facility]   3/13/2025  1:15 PM Donna Beck MD [facility]   3/19/2025  8:30 AM [facility]   7/17/2025  8:30 AM Gume Carver MD [facility]   8/13/2025  9:30 AM Georgiana Hammonds MD [facility]   2/5/2026 10:00 AM Miguel Dong APRN [facility]     Additional Instructions for the Follow-ups that You Need to Schedule       Discharge Follow-up with PCP   As directed       Currently Documented PCP:    Georgiana Hammonds MD    PCP Phone Number:    [phone]     Follow Up Details: 2-3 weeks        Discharge Follow-up with Specified Provider: Dr Beck; 2 Weeks   As directed      To: Dr Beck   Follow Up: 2 Weeks               Follow-up Information       Donna Beck MD Follow up in 2 week(s).    Specialty: General Surgery  Contact information:  [address] [facility] [address]  [facility] [id number] [phone]               Georgiana Hammonds MD .    Specialty: Family Medicine  Why: 2-3 weeks  Contact information:  [address] [facility] [address]  [place] [address]  [phone]                             Additional Instructions for the Follow-ups that You Need to Schedule       Discharge Follow-up with PCP   As directed       Currently Documented PCP:    Georgiana Hammonds MD    PCP Phone Number:    [phone]     Follow Up Details: 2-3 weeks        Discharge Follow-up with Specified Provider: Dr Beck; 2 Weeks   As directed      To: Dr Beck   Follow Up: 2 Weeks            Time Spent on Discharge:  Greater than 30 minutes      Grey Dey MD  [place] [facility]  02/26/25  10:52 EST

## 2025-02-26 NOTE — PROGRESS NOTES
Acute Care General Surgery Progress Note    Patient: Laura Cuenca  YOB: 1980  MRN: [id number]      Assessment  Laura Cuenca is a 44 y.o. female who is POD 1 laparoscopic cholecystectomy with a negative intraoperative cholangiogram.  Patient doing really well this morning, pain is controlled, incisions are in good order, she feels ready for home.  No leukocytosis, AVSS.    Plan  Okay for discharge home from a general surgery standpoint, will need to follow-up with Dr. Beck in 2 weeks, prescribed Norco as needed for pain and Zofran as needed for nausea, discussed postop instructions with the patient and she verbalized understanding      Subjective  Denies any nausea or vomiting, tolerating a diet, pain is tolerable with medication    Objective    Vitals:    02/26/25 0750   BP: 120/87   Pulse: 79   Resp: 18   Temp: 97.5 °F (36.4 °C)   SpO2:            Physical Exam  Constitutional: Alert, Oriented, in no acute distress  Respiratory: Normal work of breathing, Symmetric excursion  Cardiovascular: Well pefused, no jugular venous distention evident   Abdominal: Soft, expected postop tenderness, incisions are clean, dry, intact no surrounding erythema  Skin: Warm, dry, no jaundice      Laboratory Results  Results from last 7 days   Lab Units 02/26/25  0350 02/25/25  0323 02/24/25  0936   WBC 10*3/mm3 9.77 8.52 11.38*   HEMOGLOBIN g/dL 13.1 12.7 15.1   HEMATOCRIT % 40.7 36.7 44.4   PLATELETS 10*3/mm3 339 274 294     Results from last 7 days   Lab Units 02/26/25  0350 02/25/25  1731 02/25/25  0323 02/24/25  0936   SODIUM mmol/L 139  --  135* 128*   POTASSIUM mmol/L 4.0 3.9 3.5 3.1*   CHLORIDE mmol/L 108*  --  107 95*   CO2 mmol/L 15.0*  --  16.0* 21.0*   BUN mg/dL 11  --  9 12   CREATININE mg/dL 0.56*  --  0.53* 0.72   CALCIUM mg/dL 8.8  --  8.3* 9.6   BILIRUBIN mg/dL <0.2  --  0.2 0.3   ALK PHOS U/L 160*  --  143* 175*   ALT (SGPT) U/L 248*  --  202* 165*   AST (SGOT) U/L 278*  --  272* 210*   GLUCOSE mg/dL  146*  --  88 129*     I have personally reviewed 2/26 labs        JULIA Kathleen  Acute Care General Surgery  [facility]    [address], [address]  [place], [place], [address]  P: [phone]  F: [phone]

## 2025-02-26 NOTE — DISCHARGE INSTRUCTIONS
Donna Beck MD  [address] [facility] [address]  [place] [address]  [phone]    Discharge Instructions: Gall Bladder Surgery    Go home, rest and take it easy today; however, you should get up and move about several times today to reduce the risk of developing a blood clot in your legs.   You may experience some dizziness or memory loss from the anesthesia. This may last for the next 24 hours. Someone should plan on staying with you for the first 24 hours for your safety.   Do not make any important legal decisions or sign any legal papers for the next 24 hours.   Eat and drink lightly today. Start off with liquids, jello, soup, crackers or other bland foods at first. You may advance your diet tomorrow as tolerated as long as you do not experience any nausea or vomiting.   Your incisions are covered with skin glue. This will fall off on its own in 1-2 weeks.   You may shower 24 hours after surgery. No tub baths or swimming for at least two weeks and until your incisions are completely healed.   You have received a prescription for a narcotic pain medicine, as you will have some pain following surgery.  You will not be totally pain free, but your pain medicine should make the pain tolerable. Please take your pain medicine as prescribed and always take your pills with food to prevent nausea. If you are having severe pain that cannot be controlled by the pain medicine, please contact me.   Narcotic pain medicine can cause constipation. Take an over the counter stool softener, like Miaralax or docusate, to prevent constipation while taking narcotics.  You have also received a prescription for an anti-nausea medicine. Please take this as prescribed for any nausea or vomiting. Nausea could be a result of the anesthesia or a result of the narcotic pain medicine. If you experience severe nausea and vomiting that cannot be controlled by the nausea medicine, please call me.   It is not unusual to experience  pain/discomfort in your shoulders or your ribs after surgery. It is from the gas used during the laparoscopic procedure and usually lasts 1-3 days. The prescription pain medicine is used to treat the surgical incisional pain and does not typically alleviate this “gassy” pain.   No driving for 24 hours and for as long as you are taking your prescription pain medicine.   You will need to call the office at [phone] to schedule a follow-up appointment in 2 weeks.   Remember to contact me for any of the following:   Fever greater than 101 degrees  Severe pain that cannot be controlled by taking your pain pills  Severe nausea or vomiting that cannot be controlled by taking your nausea pills  Significant bleeding of your incisions  Drainage that has a bad smell or is yellow or green in appearance  Any other questions or concerns

## 2025-02-26 NOTE — ANESTHESIA POSTPROCEDURE EVALUATION
Patient: Laura Cuenca    Procedure Summary       Date: 02/25/25 Room / Location: [facility] OR [address] [place] [address] MAIN OR    Anesthesia Start: 1937 Anesthesia Stop: 2221    Procedure: CHOLECYSTECTOMY LAPAROSCOPIC INTRAOPERATIVE CHOLANGIOGRAM (Abdomen) Diagnosis:       Cholelithiasis and acute cholecystitis without obstruction      (Cholelithiasis and acute cholecystitis without obstruction [K80.00])    Surgeons: Donna Beck MD Provider: Jim Kulkarni MD    Anesthesia Type: general ASA Status: 2            Anesthesia Type: general    Vitals  Vitals Value Taken Time   /76 02/25/25 2245   Temp 36.8 °C (98.3 °F) 02/25/25 2217   Pulse 114 02/25/25 2245   Resp 22 02/25/25 2245   SpO2 94 % 02/25/25 2245           Post Anesthesia Care and Evaluation    Pain management: adequate    Airway patency: patent  Anesthetic complications: No anesthetic complications  PONV Status: controlled  Cardiovascular status: blood pressure returned to baseline and acceptable  Respiratory status: acceptable  Hydration status: acceptable

## 2025-02-27 ENCOUNTER — TRANSITIONAL CARE MANAGEMENT TELEPHONE ENCOUNTER (OUTPATIENT)
Dept: CALL CENTER | Facility: HOSPITAL | Age: 45
End: 2025-02-27
Payer: COMMERCIAL

## 2025-02-27 DIAGNOSIS — E03.9 HYPOTHYROIDISM, UNSPECIFIED TYPE: ICD-10-CM

## 2025-02-27 DIAGNOSIS — R74.8 ELEVATED ALKALINE PHOSPHATASE LEVEL: ICD-10-CM

## 2025-02-27 NOTE — OUTREACH NOTE
Call Center TCM Note      Flowsheet Row Responses   [facility] patient discharged from? [place]   Does the patient have one of the following disease processes/diagnoses(primary or secondary)? General Surgery   TCM attempt successful? Yes   Call start time 1346   Call end time 1348   Discharge diagnosis Nausea & vomiting   Person spoke with today (if not patient) and relationship Patient   Meds reviewed with patient/caregiver? Yes   Does the patient have all medications related to this admission filled (includes all antibiotics, pain medications, etc.) Yes   Prescription comments No concerns or questions noted.   Is the patient taking all medications as directed (includes completed medication regime)? Yes   Comments 3/5/2025  1:00 PM  HOSPITAL FOLLOW UP 30 min [facility] Georgiana Hammonds MD   Does the patient have an appointment with their PCP within 7-14 days of discharge? No   Nursing Interventions Assisted patient with making appointment per protocol   Has home health visited the patient within 72 hours of discharge? N/A   Psychosocial issues? No   Did the patient receive a copy of their discharge instructions? Yes   Nursing interventions Reviewed instructions with patient   What is the patient's perception of their health status since discharge? Improving   Nursing interventions Nurse provided patient education   Is the patient /caregiver able to teach back basic post-op care? Keep incision areas clean,dry and protected   Is the patient/caregiver able to teach back signs and symptoms of incisional infection? Increased redness, swelling or pain at the incisonal site, Increased drainage or bleeding, Incisional warmth, Pus or odor from incision, Fever   Is the patient/caregiver able to teach back steps to recovery at home? Set small, achievable goals for return to baseline health, Rest and rebuild strength, gradually increase activity   Is the patient/caregiver able to teach  back the hierarchy of who to call/visit for symptoms/problems? PCP, Specialist, Home health nurse, Urgent Care, ED, 911 Yes   TCM call completed? Yes   Wrap up additional comments Patient reports doing well. No s/s of infection noted. no concerns or questions noted.   Call end time 1348   Would this patient benefit from a Referral to [facility] Social Work? No   Is the patient interested in additional calls from an ambulatory ? No            Sheree Lu RN    2/27/2025, 13:48 EST

## 2025-03-01 LAB
BACTERIA SPEC AEROBE CULT: NORMAL
BACTERIA SPEC AEROBE CULT: NORMAL

## 2025-03-03 ENCOUNTER — OFFICE VISIT (OUTPATIENT)
Dept: OBSTETRICS AND GYNECOLOGY | Facility: CLINIC | Age: 45
End: 2025-03-03
Payer: COMMERCIAL

## 2025-03-03 VITALS
BODY MASS INDEX: 24.85 KG/M2 | DIASTOLIC BLOOD PRESSURE: 93 MMHG | HEIGHT: 60 IN | SYSTOLIC BLOOD PRESSURE: 125 MMHG | WEIGHT: 126.6 LBS

## 2025-03-03 DIAGNOSIS — Z01.419 ENCOUNTER FOR WELL WOMAN EXAM: Primary | ICD-10-CM

## 2025-03-03 LAB
CYTO UR: NORMAL
LAB AP CASE REPORT: NORMAL
PATH REPORT.FINAL DX SPEC: NORMAL
PATH REPORT.GROSS SPEC: NORMAL

## 2025-03-03 NOTE — PROGRESS NOTES
GYN Annual Exam     CC- Here for annual exam.     Laura Cuenca is a 44 y.o. female who presents for annual well woman exam. She reports that normally her periods are regular but recently her period was 60 days late. She recently changed her thyroid medication dosing and feels it is related to this. Usually, her periods last for 3 days with some additional days of spotting.   She underwent cholecystectomy last week.   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     OB History    No obstetric history on file.         Current contraception:  boyfriend Is infertile  History of abnormal Pap smear: no  Family history of uterine, colon or ovarian cancer:  yes - Maternal grandmother had uterine cancer in her 40s; endometrial cancer in mother at age 57 and  from malignancy.   History of abnormal mammogram: yes - please see below.   Family history of breast cancer: no  Last Pap : 24- NILM, negative HPV   Last Completed Pap Smear            PAP SMEAR (Every 3 Years) Next due on 2024  IGP, Apt HPV,rfx 16 / 18,45                   Last mammogram: 3/4/24- BIRADS-0 --> 3/15/24- BIRADS-4B-->2024 - breast biopsy of right breast below   1. Breast, right 8 o'clock position, core biopsy:  A. Fibroadenomatoid change/fibroadenoma.  B. Duct ectasia and fragments of sclerotic cyst wall.  C. Sclerosing adenosis.  2. Breast, right 10: 30 o'clock position, core biopsy:  A. Fibroadenomatoid change and stromal fibrosis.  B. Scattered microcysts and fragments of cyst wall.    Follow up imaging on 10/3/24- BIRADS-3--> follow up with yearly mammogram and ultrasound in 6 months   Last Completed Mammogram            Ordered - MAMMOGRAM (Every 2 Years) Ordered on 10/16/2024      2024  Mammo Diagnostic Digital Tomosynthesis Right With CAD    2024  Mammo Screening Digital Tomosynthesis Bilateral With CAD                  Past Medical History:   Diagnosis Date    Allergic      egg whites, oak trees, wheat    Congenital deformity     TOES    Environmental allergies     Frontal mass of brain     Heartburn     ON OCC    High blood pressure     Hypothyroidism 2016?    Insomnia     Lumbar disc disorder     STATES HAS THREE DISC IN BACK THAT HAVE BEEN FUSED SINCE BIRTH    Meningioma 02/2021    Migraine     PT STATED SHE HAS HAD MIGRAINES SINCE A CHILD. HAD AN MRI FEB 2021--FRONTAL MASS.     Thyroid disease     HYPO    Vision loss        Past Surgical History:   Procedure Laterality Date    BRAIN SURGERY  2/26/2021    Craniotomy    CHOLECYSTECTOMY WITH INTRAOPERATIVE CHOLANGIOGRAM N/A 2/25/2025    Procedure: CHOLECYSTECTOMY LAPAROSCOPIC INTRAOPERATIVE CHOLANGIOGRAM;  Surgeon: Donna Beck MD;  Location: [facility] OR;  Service: General;  Laterality: N/A;    CRANIOTOMY FOR TUMOR N/A 02/26/2021    Procedure: BIFRONTAL CRANIOTOMY FOR TUMOR STEREOTACTIC, WITH FAT GRAFTING;  Surgeon: Gume Carver MD;  Location: [facility] OR;  Service: Neurosurgery;  Laterality: N/A;    US GUIDED CYST ASPIRATION BREAST N/A 4/1/2024    WISDOM TOOTH EXTRACTION           Current Outpatient Medications:     amitriptyline (ELAVIL) 25 MG tablet, Take 2 tablets by mouth Every Night., Disp: 180 tablet, Rfl: 3    aspirin 81 MG EC tablet, Take 1 tablet by mouth Daily., Disp: , Rfl:     Atogepant 60 MG tablet, Take 1 tablet by mouth Daily., Disp: 30 tablet, Rfl: 2    atorvastatin (LIPITOR) 10 MG tablet, Take 1 tablet by mouth Every Night., Disp: 90 tablet, Rfl: 3    fexofenadine (ALLEGRA) 180 MG tablet, Take 1 tablet by mouth Daily., Disp: , Rfl:     Ibuprofen 3 %, Gabapentin 10 %, Baclofen 2 %, lidocaine 4 %, Apply 1-2 g topically to the appropriate area as directed 3 (Three) to 4 (Four) times daily., Disp: 90 g, Rfl: 0    levothyroxine (SYNTHROID, LEVOTHROID) 137 MCG tablet, Take 1 tablet by mouth Every Morning., Disp: , Rfl:     topiramate (Topamax) 100 MG tablet, Take 1 tablet by mouth 2 (Two) Times a Day.,  "Disp: 60 tablet, Rfl: 2    ZOLMitriptan (ZOMIG-ZMT) 5 MG disintegrating tablet, Take one tablet by mouth as needed for migraine. May repeat in 2 hours if needed. Do not exceed 2 tablets in 24 hours. (Patient taking differently: Place 1 tablet on the tongue 1 (One) Time As Needed for Migraine. Take one tablet by mouth as needed for migraine. May repeat in 2 hours if needed. Do not exceed 2 tablets in 24 hours.), Disp: 9 tablet, Rfl: 2    Allergies   Allergen Reactions    Egg White [Egg White (Egg Protein)] Other (See Comments)     GIVES HER GAS    Pollen Extract Unknown - Low Severity    Wheat Unknown - Low Severity    Percocet [Oxycodone-Acetaminophen] Rash       Social History     Tobacco Use    Smoking status: Former     Current packs/day: 0.00     Average packs/day: 0.5 packs/day for 20.0 years (10.0 ttl pk-yrs)     Types: Cigarettes     Start date: 2016     Quit date: 2021     Years since quittin.0    Smokeless tobacco: Never   Vaping Use    Vaping status: Some Days    Substances: THC    Devices: Disposable   Substance Use Topics    Alcohol use: Yes     Comment: Maybe one or two drinks every 6 weeks.    Drug use: Yes     Types: Marijuana     Comment: WILL STOP TILL SURGERY COMPLETED. VAPES       Family History   Problem Relation Age of Onset    Heart disease Father     Hypertension Father     Heart disease Mother     Hypertension Mother     Cancer Mother     Diabetes Mother     Endometrial cancer Mother     Early death Mother     Hyperlipidemia Mother     Vision loss Mother     Endometrial cancer Brother     Uterine cancer Maternal Grandmother     Malig Hyperthermia Neg Hx     Breast cancer Neg Hx     Ovarian cancer Neg Hx     Colon cancer Neg Hx        Review of Systems   Genitourinary:  Positive for menstrual problem.       /93   Ht 152.4 cm (60\")   Wt 57.4 kg (126 lb 9.6 oz)   LMP 2025 (Within Days)   BMI 24.72 kg/m²     Physical Exam  Vitals reviewed. Exam conducted with a " chaperone present.   Constitutional:       General: She is not in acute distress.  HENT:      Head: Normocephalic and atraumatic.      Right Ear: External ear normal.      Left Ear: External ear normal.   Eyes:      Extraocular Movements: Extraocular movements intact.      Pupils: Pupils are equal, round, and reactive to light.   Cardiovascular:      Rate and Rhythm: Normal rate.   Pulmonary:      Effort: Pulmonary effort is normal. No respiratory distress.   Chest:   Breasts:     Right: No swelling, bleeding, inverted nipple, mass, nipple discharge, skin change or tenderness.      Left: No swelling, bleeding, inverted nipple, mass, nipple discharge, skin change or tenderness.   Abdominal:      General: There is no distension.      Palpations: Abdomen is soft.      Tenderness: There is no abdominal tenderness. There is no guarding or rebound.   Genitourinary:     General: Normal vulva.      Exam position: Lithotomy position.      Labia:         Right: No rash, tenderness, lesion or injury.         Left: No rash, tenderness, lesion or injury.       Urethra: No prolapse or urethral swelling.      Vagina: No vaginal discharge, erythema, tenderness, bleeding or lesions.      Cervix: Lesion (small cervical polyp visualized.) present.      Uterus: Not enlarged, not fixed and not tender.       Adnexa:         Right: No mass, tenderness or fullness.          Left: No mass, tenderness or fullness.     Musculoskeletal:         General: No deformity. Normal range of motion.      Cervical back: Normal range of motion and neck supple.   Lymphadenopathy:      Upper Body:      Right upper body: No supraclavicular or axillary adenopathy.      Left upper body: No supraclavicular or axillary adenopathy.      Lower Body: No right inguinal adenopathy. No left inguinal adenopathy.   Skin:     General: Skin is warm and dry.   Neurological:      General: No focal deficit present.      Mental Status: She is alert and oriented to person,  place, and time.   Psychiatric:         Mood and Affect: Mood normal.         Behavior: Behavior normal.       Assessment     1) GYN annual well woman exam.      Plan     1) Breast Health - Clinical breast exam & mammogram yearly, Self breast awareness monthly. She will need to schedule her annual mammogram and right breast ultrasound in April 2025.  2) Pap - Up to date. Due in 2029 per ASCCP guidelines.   3) Smoking status- former smoker.   4) Contraception- none. Boyfriend is infertile.   5) Activity recommends - Adult 150-300 min/week of multi-component physical activities that include balance training, aerobic and physical strengthening.    6) Follow up prn and one year      Katarina Topete MD

## 2025-03-04 ENCOUNTER — SPECIALTY PHARMACY (OUTPATIENT)
Dept: NEUROLOGY | Facility: CLINIC | Age: 45
End: 2025-03-04
Payer: COMMERCIAL

## 2025-03-04 NOTE — PROGRESS NOTES
Specialty Pharmacy Patient Management Program  Refill Outreach      was contacted today regarding refills of their medication(s).    Refill Questions      Flowsheet Row Most Recent Value   Changes to allergies? No   Changes to medications? No   New conditions or infections since last clinic visit No   Unplanned office visit, urgent care, ED, or hospital admission in the last 4 weeks  No   How does patient/caregiver feel medication is working? Good   Financial problems or insurance changes  No   Since the previous refill, were any specialty medication doses or scheduled injections missed or delayed?  No   Does this patient require a clinical escalation to a pharmacist? No            Delivery Questions      Flowsheet Row Most Recent Value   Delivery method [employer]   Delivery address verified with patient/caregiver? Yes   Delivery address Prescription   Number of medications in delivery 1   Medication(s) being filled and delivered Atogepant   Doses left of specialty medications 6   Copay verified? Yes   Copay amount $0   Copay form of payment No copayment ($0)   Delivery Date Selection 03/05/25   Signature Required No                 Follow-up: 21 day(s)     Diandra Stephens, Pharmacy Technician  3/4/2025  09:29 EST

## 2025-03-05 ENCOUNTER — OFFICE VISIT (OUTPATIENT)
Dept: FAMILY MEDICINE CLINIC | Facility: CLINIC | Age: 45
End: 2025-03-05
Payer: COMMERCIAL

## 2025-03-05 VITALS
HEIGHT: 60 IN | TEMPERATURE: 97.1 F | DIASTOLIC BLOOD PRESSURE: 78 MMHG | HEART RATE: 86 BPM | SYSTOLIC BLOOD PRESSURE: 110 MMHG | WEIGHT: 129.4 LBS | BODY MASS INDEX: 25.4 KG/M2 | OXYGEN SATURATION: 99 %

## 2025-03-05 DIAGNOSIS — K80.20 CALCULUS OF GALLBLADDER WITHOUT CHOLECYSTITIS WITHOUT OBSTRUCTION: Primary | ICD-10-CM

## 2025-03-05 DIAGNOSIS — R53.83 OTHER FATIGUE: ICD-10-CM

## 2025-03-05 DIAGNOSIS — R79.89 ELEVATED LFTS: ICD-10-CM

## 2025-03-05 NOTE — PROGRESS NOTES
Transitional Care Follow Up Visit  Subjective     Laura Cuenca is a 44 y.o. female who presents for a transitional care management visit.    Within 48 business hours after discharge our office contacted her via telephone to coordinate her care and needs.      I reviewed and discussed the details of that call along with the discharge summary, hospital problems, inpatient lab results, inpatient diagnostic studies, and consultation reports with .     Current outpatient and discharge medications have been reconciled for the patient.  Reviewed by: Georgiana Hammonds MD          2/26/2025     4:46 PM   Date of TCM Phone Call   [facility]   Date of Admission 2/24/2025   Date of Discharge 2/26/2025   Discharge Disposition Home or Self Care     Risk for Readmission (LACE) Score: 2 (2/26/2025  6:00 AM)      History of Present Illness   Course During Hospital Stay: 44-year-old female who presents for hospital follow-up after cholelithiasis and cholecystectomy.    She presented to the hospital on 2/24 with nausea and vomiting as well as generalized weakness.  She was found to have symptomatic cholelithiasis and admitted to the hospital.  She was also found to have possible pneumonia but because LFTs were significantly elevated thought to be more related to cholelithiasis/cholangitis.  General surgery was consulted and she underwent laparoscopic cholecystectomy while admitted.  Did well postop without complications.  She was discharged on 2/26.     The following portions of the patient's history were reviewed and updated as appropriate: allergies, current medications, past family history, past medical history, past social history, past surgical history, and problem list.    Review of Systems   Constitutional:  Positive for fatigue. Negative for chills, diaphoresis and fever.   Respiratory:  Negative for shortness of breath.    Cardiovascular:  Negative for chest pain.   Gastrointestinal:  Positive for  "constipation. Negative for abdominal pain, nausea and vomiting.       Objective   /78 (BP Location: Right arm, Patient Position: Sitting, Cuff Size: Adult)   Pulse 86   Temp 97.1 °F (36.2 °C)   Ht 152.4 cm (60\")   Wt 58.7 kg (129 lb 6.4 oz)   SpO2 99%   BMI 25.27 kg/m²   Physical Exam  Constitutional:       General: She is not in acute distress.  Eyes:      Conjunctiva/sclera: Conjunctivae normal.   Cardiovascular:      Rate and Rhythm: Normal rate and regular rhythm.   Pulmonary:      Effort: Pulmonary effort is normal. No respiratory distress.   Abdominal:      Palpations: Abdomen is soft.      Tenderness: There is no abdominal tenderness.   Skin:     Comments: Well-healing incisions.   Neurological:      Mental Status: She is alert and oriented to person, place, and time.   Psychiatric:         Mood and Affect: Mood normal.         Behavior: Behavior normal.         Assessment & Plan   Diagnoses and all orders for this visit:    1. Calculus of gallbladder without cholecystitis without obstruction (Primary)    2. Elevated LFTs  -     Comprehensive Metabolic Panel    3. Other fatigue  -     CBC Auto Differential  -     Comprehensive Metabolic Panel  -     Iron and TIBC  -     Ferritin  -     Vitamin B12      Overall doing well postop.  Incisions are well-healing without signs of infection.  She continues to have fatigue but this is less severe.    Liver enzymes were significantly elevated prior to cholecystectomy.  Will recheck today.  Will also check CBC with iron levels and B12.             "

## 2025-03-06 LAB
ALBUMIN SERPL-MCNC: 4 G/DL (ref 3.5–5.2)
ALBUMIN/GLOB SERPL: 1.3 G/DL
ALP SERPL-CCNC: 185 U/L (ref 39–117)
ALT SERPL-CCNC: 61 U/L (ref 1–33)
AST SERPL-CCNC: 31 U/L (ref 1–32)
BASOPHILS # BLD AUTO: 0.05 10*3/MM3 (ref 0–0.2)
BASOPHILS NFR BLD AUTO: 0.6 % (ref 0–1.5)
BILIRUB SERPL-MCNC: 0.2 MG/DL (ref 0–1.2)
BUN SERPL-MCNC: 11 MG/DL (ref 6–20)
BUN/CREAT SERPL: 15.7 (ref 7–25)
CALCIUM SERPL-MCNC: 9.8 MG/DL (ref 8.6–10.5)
CHLORIDE SERPL-SCNC: 103 MMOL/L (ref 98–107)
CO2 SERPL-SCNC: 22.6 MMOL/L (ref 22–29)
CREAT SERPL-MCNC: 0.7 MG/DL (ref 0.57–1)
EGFRCR SERPLBLD CKD-EPI 2021: 110.2 ML/MIN/1.73
EOSINOPHIL # BLD AUTO: 0.19 10*3/MM3 (ref 0–0.4)
EOSINOPHIL NFR BLD AUTO: 2.4 % (ref 0.3–6.2)
ERYTHROCYTE [DISTWIDTH] IN BLOOD BY AUTOMATED COUNT: 12.8 % (ref 12.3–15.4)
FERRITIN SERPL-MCNC: 475 NG/ML (ref 13–150)
GLOBULIN SER CALC-MCNC: 3.2 GM/DL
GLUCOSE SERPL-MCNC: 92 MG/DL (ref 65–99)
HCT VFR BLD AUTO: 46.7 % (ref 34–46.6)
HGB BLD-MCNC: 15.4 G/DL (ref 12–15.9)
IMM GRANULOCYTES # BLD AUTO: 0.13 10*3/MM3 (ref 0–0.05)
IMM GRANULOCYTES NFR BLD AUTO: 1.6 % (ref 0–0.5)
IRON SATN MFR SERPL: 26 % (ref 20–50)
IRON SERPL-MCNC: 82 MCG/DL (ref 37–145)
LYMPHOCYTES # BLD AUTO: 2.56 10*3/MM3 (ref 0.7–3.1)
LYMPHOCYTES NFR BLD AUTO: 31.7 % (ref 19.6–45.3)
MCH RBC QN AUTO: 30.7 PG (ref 26.6–33)
MCHC RBC AUTO-ENTMCNC: 33 G/DL (ref 31.5–35.7)
MCV RBC AUTO: 93.2 FL (ref 79–97)
MONOCYTES # BLD AUTO: 1.03 10*3/MM3 (ref 0.1–0.9)
MONOCYTES NFR BLD AUTO: 12.7 % (ref 5–12)
NEUTROPHILS # BLD AUTO: 4.12 10*3/MM3 (ref 1.7–7)
NEUTROPHILS NFR BLD AUTO: 51 % (ref 42.7–76)
NRBC BLD AUTO-RTO: 0 /100 WBC (ref 0–0.2)
PLATELET # BLD AUTO: 481 10*3/MM3 (ref 140–450)
POTASSIUM SERPL-SCNC: 4.9 MMOL/L (ref 3.5–5.2)
PROT SERPL-MCNC: 7.2 G/DL (ref 6–8.5)
RBC # BLD AUTO: 5.01 10*6/MM3 (ref 3.77–5.28)
SODIUM SERPL-SCNC: 138 MMOL/L (ref 136–145)
TIBC SERPL-MCNC: 316 MCG/DL
UIBC SERPL-MCNC: 234 MCG/DL (ref 112–346)
VIT B12 SERPL-MCNC: 1567 PG/ML (ref 211–946)
WBC # BLD AUTO: 8.08 10*3/MM3 (ref 3.4–10.8)

## 2025-03-13 ENCOUNTER — OFFICE VISIT (OUTPATIENT)
Dept: SURGERY | Facility: CLINIC | Age: 45
End: 2025-03-13
Payer: COMMERCIAL

## 2025-03-13 VITALS
OXYGEN SATURATION: 96 % | SYSTOLIC BLOOD PRESSURE: 112 MMHG | WEIGHT: 130 LBS | DIASTOLIC BLOOD PRESSURE: 86 MMHG | HEART RATE: 110 BPM | HEIGHT: 60 IN | BODY MASS INDEX: 25.52 KG/M2

## 2025-03-13 DIAGNOSIS — Z90.49 STATUS POST LAPAROSCOPIC CHOLECYSTECTOMY: Primary | ICD-10-CM

## 2025-03-13 PROCEDURE — 99024 POSTOP FOLLOW-UP VISIT: CPT | Performed by: STUDENT IN AN ORGANIZED HEALTH CARE EDUCATION/TRAINING PROGRAM

## 2025-03-13 NOTE — PROGRESS NOTES
General Surgery Office Follow Up Note     History of Present Illness:    Laura Cuenca is a 44 y.o. female who presents for follow-up status post laparoscopic cholecystectomy with intraoperative cholangiogram performed 2/25/2025. Pathology demonstrated gallbladder with chronic follicular cholecystitis, cholelithiasis, and erosion. Postoperatively the patient states she is feeling well. She has been tolerating a regular diet. She had some diarrhea a few times early on after surgery but this is improving. She has no incisional complaints. She is getting back her energy and returning to normal activities.  LFTs were checked on 3/5/25 and were downtrending and nearly normalized compared to those from hospitalization. Her PCP plans to check these again.    Past Medical History:  Past Medical History:   Diagnosis Date    Allergic     egg whites, oak trees, wheat    Cholelithiasis 02/24/25    gallbladder removed 2/25/25    Congenital deformity     TOES    Environmental allergies     Frontal mass of brain     Heartburn     ON OCC    High blood pressure     HPV (human papilloma virus) infection     Hypothyroidism 2016?    Insomnia     Lumbar disc disorder     STATES HAS THREE DISC IN BACK THAT HAVE BEEN FUSED SINCE BIRTH    Meningioma 02/2021    Migraine     PT STATED SHE HAS HAD MIGRAINES SINCE A CHILD. HAD AN MRI FEB 2021--FRONTAL MASS.     Thyroid disease     HYPO    Vision loss        Past Surgical History:  Past Surgical History:   Procedure Laterality Date    BRAIN SURGERY  2/26/2021    Craniotomy    CHOLECYSTECTOMY  2/25/25    CHOLECYSTECTOMY WITH INTRAOPERATIVE CHOLANGIOGRAM N/A 02/25/2025    Procedure: CHOLECYSTECTOMY LAPAROSCOPIC INTRAOPERATIVE CHOLANGIOGRAM;  Surgeon: Donna Beck MD;  Location: [facility];  Service: General;  Laterality: N/A;    CRANIOTOMY  02/26/2021    CRANIOTOMY FOR TUMOR N/A 02/26/2021    Procedure: BIFRONTAL CRANIOTOMY FOR TUMOR STEREOTACTIC, WITH FAT GRAFTING;  Surgeon: Wen  Gume HOGUE MD;  Location: [facility] MAIN OR;  Service: Neurosurgery;  Laterality: N/A;    US GUIDED CYST ASPIRATION BREAST N/A 2024    WISDOM TOOTH EXTRACTION         Family History:  Family History   Problem Relation Age of Onset    Heart disease Father     Hypertension Father     Heart disease Mother     Hypertension Mother     Cancer Mother         endometrial    Diabetes Mother     Endometrial cancer Mother     Early death Mother     Hyperlipidemia Mother     Vision loss Mother     Uterine cancer Mother     Endometrial cancer Brother     Uterine cancer Maternal Grandmother     Cancer Maternal Grandmother         uterine    Heart disease Maternal Uncle     Heart disease Maternal Uncle     Migraines Maternal Uncle     Migraines Maternal Uncle     Malig Hyperthermia Neg Hx     Breast cancer Neg Hx     Ovarian cancer Neg Hx     Colon cancer Neg Hx        Social History:  Social History     Socioeconomic History    Marital status: Single   Tobacco Use    Smoking status: Former     Current packs/day: 0.00     Average packs/day: 0.5 packs/day for 20.0 years (10.0 ttl pk-yrs)     Types: Cigarettes     Start date: 2016     Quit date: 2021     Years since quittin.0    Smokeless tobacco: Never   Vaping Use    Vaping status: Some Days    Substances: THC    Devices: Disposable   Substance and Sexual Activity    Alcohol use: Yes     Comment: Maybe one or two drinks every 6 weeks.    Drug use: Yes     Types: Marijuana     Comment: WILL STOP TILL SURGERY COMPLETED. VAPES    Sexual activity: Yes     Partners: Male     Birth control/protection: None       Allergies:  Allergies   Allergen Reactions    Egg White [Egg White (Egg Protein)] Other (See Comments)     GIVES HER GAS    Pollen Extract Unknown - Low Severity    Wheat Unknown - Low Severity    Percocet [Oxycodone-Acetaminophen] Rash       Meds:    Current Outpatient Medications:     amitriptyline (ELAVIL) 25 MG tablet, Take 2 tablets by mouth Every Night.,  "Disp: 180 tablet, Rfl: 3    aspirin 81 MG EC tablet, Take 1 tablet by mouth Daily., Disp: , Rfl:     Atogepant 60 MG tablet, Take 1 tablet by mouth Daily., Disp: 30 tablet, Rfl: 2    atorvastatin (LIPITOR) 10 MG tablet, Take 1 tablet by mouth Every Night., Disp: 90 tablet, Rfl: 3    fexofenadine (ALLEGRA) 180 MG tablet, Take 1 tablet by mouth Daily., Disp: , Rfl:     Ibuprofen 3 %, Gabapentin 10 %, Baclofen 2 %, lidocaine 4 %, Apply 1-2 g topically to the appropriate area as directed 3 (Three) to 4 (Four) times daily., Disp: 90 g, Rfl: 0    levothyroxine (SYNTHROID, LEVOTHROID) 137 MCG tablet, Take 1 tablet by mouth Every Morning., Disp: , Rfl:     topiramate (Topamax) 100 MG tablet, Take 1 tablet by mouth 2 (Two) Times a Day., Disp: 60 tablet, Rfl: 2    ZOLMitriptan (ZOMIG-ZMT) 5 MG disintegrating tablet, Take one tablet by mouth as needed for migraine. May repeat in 2 hours if needed. Do not exceed 2 tablets in 24 hours. (Patient taking differently: Place 1 tablet on the tongue 1 (One) Time As Needed for Migraine. Take one tablet by mouth as needed for migraine. May repeat in 2 hours if needed. Do not exceed 2 tablets in 24 hours.), Disp: 9 tablet, Rfl: 2      Physical Exam:   /86   Pulse 110   Ht 152 cm (59.84\")   Wt 59 kg (130 lb)   LMP 02/18/2025 (Within Days)   SpO2 96%   BMI 25.52 kg/m²   Constitutional: Well-developed well-nourished   Eyes: Conjunctiva normal, sclera nonicteric  HENT: Hearing grossly normal, oral mucosa moist  Respiratory: Normal inspiratory effort  Cardiovascular: Regular rate, no peripheral edema   Gastrointestinal: Abd soft, nontender, nondistended, laparoscopic incisions well approximated without signs of infection  Musculoskeletal: Symmetric strength, normal gait  Psychiatric: Normal mood and affect  Skin:  Warm, dry, no rash on visualized skin surfaces    Labs:  CMP 3/5/2025 -alkaline phosphatase 185, ALT 61, otherwise within normal limits; transaminases notably decreased " from prior on 2/26/2025  CBC 3/5/2025-WBC 8.08, hemoglobin 15.4, hematocrit 46.7, platelets 41    Pathology:  Pathology 2/25/2025-  Final Diagnosis   1. Gallbladder, cholecystectomy:               A. Gallbladder with chronic follicular cholecystitis, cholelithiasis, and erosion.      Assessment/Plan:  44 y.o. female presenting status post laparoscopic cholecystectomy with IOC on 2/25/2025.    Patient is doing well postoperatively, tolerating diet, having return of normal bowel function, with incisions healing in good order. I discussed her benign path results with her and noted that her downtrending LFTs are reassuring, and that I suspect they will normalize as she recovers from her surgery. She may return to activity as tolerated and follow up with general surgery as needed.     Donna Beck M.D.  General, Robotic and Endoscopic Surgery  [facility]    [address], [address]  [place], [place], [address]  P: [phone]  F: [phone]

## 2025-03-13 NOTE — LETTER
March 18, 2025     Georgiana Hammonds MD  [address]  [address]  [facility] [address]    Patient: Laura Cuenca   YOB: 1980   Date of Visit: 3/13/2025     Dear Dr. Hammonds,    I had the pleasure of taking care of Ms. Laura Cuenca in the hospital during her recent admission. She was found to have cholecystitis and underwent laparoscopic cholecystectomy with cholangiogram on 2/25/25. Postoperatively she is doing well, having followed up with me in the office on 3/13/25.  Below are the relevant portions of my assessment and plan of care.    If you have questions, please do not hesitate to call me. I am happy to see Ms. Cuenca back for any general surgical needs.         Sincerely,        Donna Beck MD        CC: No Recipients    Donna Beck MD  03/18/25 2132  Sign when Signing Visit  General Surgery Office Follow Up Note     History of Present Illness:    Laura Cuenca is a 44 y.o. female who presents for follow-up status post laparoscopic cholecystectomy with intraoperative cholangiogram performed 2/25/2025. Pathology demonstrated gallbladder with chronic follicular cholecystitis, cholelithiasis, and erosion. Postoperatively the patient states she is feeling well. She has been tolerating a regular diet. She had some diarrhea a few times early on after surgery but this is improving. She has no incisional complaints. She is getting back her energy and returning to normal activities.  LFTs were checked on 3/5/25 and were downtrending and nearly normalized compared to those from hospitalization. Her PCP plans to check these again.    Past Medical History:  Past Medical History:   Diagnosis Date   • Allergic     egg whites, oak trees, wheat   • Cholelithiasis 02/24/25    gallbladder removed 2/25/25   • Congenital deformity     TOES   • Environmental allergies    • Frontal mass of brain    • Heartburn     ON OCC   • High blood pressure    • HPV (human papilloma virus) infection    •  Hypothyroidism 2016?   • Insomnia    • Lumbar disc disorder     STATES HAS THREE DISC IN BACK THAT HAVE BEEN FUSED SINCE BIRTH   • Meningioma 02/2021   • Migraine     PT STATED SHE HAS HAD MIGRAINES SINCE A CHILD. HAD AN MRI FEB 2021--FRONTAL MASS.    • Thyroid disease     HYPO   • Vision loss        Past Surgical History:  Past Surgical History:   Procedure Laterality Date   • BRAIN SURGERY  2/26/2021    Craniotomy   • CHOLECYSTECTOMY  2/25/25   • CHOLECYSTECTOMY WITH INTRAOPERATIVE CHOLANGIOGRAM N/A 02/25/2025    Procedure: CHOLECYSTECTOMY LAPAROSCOPIC INTRAOPERATIVE CHOLANGIOGRAM;  Surgeon: Donna Beck MD;  Location: [facility];  Service: General;  Laterality: N/A;   • CRANIOTOMY  02/26/2021   • CRANIOTOMY FOR TUMOR N/A 02/26/2021    Procedure: BIFRONTAL CRANIOTOMY FOR TUMOR STEREOTACTIC, WITH FAT GRAFTING;  Surgeon: Gume Carver MD;  Location: [facility];  Service: Neurosurgery;  Laterality: N/A;   • US GUIDED CYST ASPIRATION BREAST N/A 04/01/2024   • WISDOM TOOTH EXTRACTION         Family History:  Family History   Problem Relation Age of Onset   • Heart disease Father    • Hypertension Father    • Heart disease Mother    • Hypertension Mother    • Cancer Mother         endometrial   • Diabetes Mother    • Endometrial cancer Mother    • Early death Mother    • Hyperlipidemia Mother    • Vision loss Mother    • Uterine cancer Mother    • Endometrial cancer Brother    • Uterine cancer Maternal Grandmother    • Cancer Maternal Grandmother         uterine   • Heart disease Maternal Uncle    • Heart disease Maternal Uncle    • Migraines Maternal Uncle    • Migraines Maternal Uncle    • Malig Hyperthermia Neg Hx    • Breast cancer Neg Hx    • Ovarian cancer Neg Hx    • Colon cancer Neg Hx        Social History:  Social History     Socioeconomic History   • Marital status: Single   Tobacco Use   • Smoking status: Former     Current packs/day: 0.00     Average packs/day: 0.5 packs/day for 20.0 years  (10.0 ttl pk-yrs)     Types: Cigarettes     Start date: 2016     Quit date: 2021     Years since quittin.0   • Smokeless tobacco: Never   Vaping Use   • Vaping status: Some Days   • Substances: THC   • Devices: Disposable   Substance and Sexual Activity   • Alcohol use: Yes     Comment: Maybe one or two drinks every 6 weeks.   • Drug use: Yes     Types: Marijuana     Comment: WILL STOP TILL SURGERY COMPLETED. VAPES   • Sexual activity: Yes     Partners: Male     Birth control/protection: None       Allergies:  Allergies   Allergen Reactions   • Egg White [Egg White (Egg Protein)] Other (See Comments)     GIVES HER GAS   • Pollen Extract Unknown - Low Severity   • Wheat Unknown - Low Severity   • Percocet [Oxycodone-Acetaminophen] Rash       Meds:    Current Outpatient Medications:   •  amitriptyline (ELAVIL) 25 MG tablet, Take 2 tablets by mouth Every Night., Disp: 180 tablet, Rfl: 3  •  aspirin 81 MG EC tablet, Take 1 tablet by mouth Daily., Disp: , Rfl:   •  Atogepant 60 MG tablet, Take 1 tablet by mouth Daily., Disp: 30 tablet, Rfl: 2  •  atorvastatin (LIPITOR) 10 MG tablet, Take 1 tablet by mouth Every Night., Disp: 90 tablet, Rfl: 3  •  fexofenadine (ALLEGRA) 180 MG tablet, Take 1 tablet by mouth Daily., Disp: , Rfl:   •  Ibuprofen 3 %, Gabapentin 10 %, Baclofen 2 %, lidocaine 4 %, Apply 1-2 g topically to the appropriate area as directed 3 (Three) to 4 (Four) times daily., Disp: 90 g, Rfl: 0  •  levothyroxine (SYNTHROID, LEVOTHROID) 137 MCG tablet, Take 1 tablet by mouth Every Morning., Disp: , Rfl:   •  topiramate (Topamax) 100 MG tablet, Take 1 tablet by mouth 2 (Two) Times a Day., Disp: 60 tablet, Rfl: 2  •  ZOLMitriptan (ZOMIG-ZMT) 5 MG disintegrating tablet, Take one tablet by mouth as needed for migraine. May repeat in 2 hours if needed. Do not exceed 2 tablets in 24 hours. (Patient taking differently: Place 1 tablet on the tongue 1 (One) Time As Needed for Migraine. Take one tablet by  "mouth as needed for migraine. May repeat in 2 hours if needed. Do not exceed 2 tablets in 24 hours.), Disp: 9 tablet, Rfl: 2      Physical Exam:   /86   Pulse 110   Ht 152 cm (59.84\")   Wt 59 kg (130 lb)   LMP 02/18/2025 (Within Days)   SpO2 96%   BMI 25.52 kg/m²   Constitutional: Well-developed well-nourished   Eyes: Conjunctiva normal, sclera nonicteric  HENT: Hearing grossly normal, oral mucosa moist  Respiratory: Normal inspiratory effort  Cardiovascular: Regular rate, no peripheral edema   Gastrointestinal: Abd soft, nontender, nondistended, laparoscopic incisions well approximated without signs of infection  Musculoskeletal: Symmetric strength, normal gait  Psychiatric: Normal mood and affect  Skin:  Warm, dry, no rash on visualized skin surfaces    Labs:  CMP 3/5/2025 -alkaline phosphatase 185, ALT 61, otherwise within normal limits; transaminases notably decreased from prior on 2/26/2025  CBC 3/5/2025-WBC 8.08, hemoglobin 15.4, hematocrit 46.7, platelets 41    Pathology:  Pathology 2/25/2025-  Final Diagnosis   1. Gallbladder, cholecystectomy:               A. Gallbladder with chronic follicular cholecystitis, cholelithiasis, and erosion.      Assessment/Plan:  44 y.o. female presenting status post laparoscopic cholecystectomy with IOC on 2/25/2025.    Patient is doing well postoperatively, tolerating diet, having return of normal bowel function, with incisions healing in good order. I discussed her benign path results with her and noted that her downtrending LFTs are reassuring, and that I suspect they will normalize as she recovers from her surgery. She may return to activity as tolerated and follow up with general surgery as needed.     Donna Beck M.D.  General, Robotic and Endoscopic Surgery  [facility] [place]    [address], [address]  [place], [place], [address]  P: [phone]  F: [phone]     "

## 2025-03-20 LAB
ALBUMIN SERPL-MCNC: 3.8 G/DL (ref 3.5–5.2)
ALBUMIN/GLOB SERPL: 1.3 G/DL
ALP SERPL-CCNC: 161 U/L (ref 39–117)
ALT SERPL-CCNC: 33 U/L (ref 1–33)
AST SERPL-CCNC: 27 U/L (ref 1–32)
BILIRUB SERPL-MCNC: 0.2 MG/DL (ref 0–1.2)
BUN SERPL-MCNC: 8 MG/DL (ref 6–20)
BUN/CREAT SERPL: 11.3 (ref 7–25)
CALCIUM SERPL-MCNC: 10 MG/DL (ref 8.6–10.5)
CHLORIDE SERPL-SCNC: 110 MMOL/L (ref 98–107)
CO2 SERPL-SCNC: 23.3 MMOL/L (ref 22–29)
CREAT SERPL-MCNC: 0.71 MG/DL (ref 0.57–1)
EGFRCR SERPLBLD CKD-EPI 2021: 108.3 ML/MIN/1.73
GGT SERPL-CCNC: 30 U/L (ref 5–36)
GLOBULIN SER CALC-MCNC: 2.9 GM/DL
GLUCOSE SERPL-MCNC: 94 MG/DL (ref 65–99)
POTASSIUM SERPL-SCNC: 4.6 MMOL/L (ref 3.5–5.2)
PROT SERPL-MCNC: 6.7 G/DL (ref 6–8.5)
SODIUM SERPL-SCNC: 145 MMOL/L (ref 136–145)
TSH SERPL DL<=0.005 MIU/L-ACNC: 0.03 UIU/ML (ref 0.27–4.2)

## 2025-03-21 ENCOUNTER — TELEPHONE (OUTPATIENT)
Dept: OBSTETRICS AND GYNECOLOGY | Facility: CLINIC | Age: 45
End: 2025-03-21
Payer: COMMERCIAL

## 2025-03-21 NOTE — TELEPHONE ENCOUNTER
Pt worked with [facility] and scheduled her 6mth follow up DX Bilat Mammo & Right Limited US for 4/1/25 @ 7 & 730am.

## 2025-03-28 ENCOUNTER — SPECIALTY PHARMACY (OUTPATIENT)
Dept: NEUROLOGY | Facility: CLINIC | Age: 45
End: 2025-03-28
Payer: COMMERCIAL

## 2025-03-28 RX ORDER — TOPIRAMATE 100 MG/1
100 TABLET, FILM COATED ORAL 2 TIMES DAILY
Qty: 180 TABLET | Refills: 1 | Status: SHIPPED | OUTPATIENT
Start: 2025-03-28

## 2025-03-28 NOTE — PROGRESS NOTES
Specialty Pharmacy Refill Coordination Note      is a 44 y.o. female contacted today regarding refills of her specialty medication(s).    Specialty medication(s) and dose(s) confirmed: atogepant 60 mg by mouth daily  Changes to medications: no  Changes to insurance: no      Refill Questions      Flowsheet Row Most Recent Value   Changes to allergies? No   Changes to medications? No   New conditions or infections since last clinic visit No   Unplanned office visit, urgent care, ED, or hospital admission in the last 4 weeks  No   How does patient/caregiver feel medication is working? Very good   Financial problems or insurance changes  No   Since the previous refill, were any specialty medication doses or scheduled injections missed or delayed?  No   Does this patient require a clinical escalation to a pharmacist? No            Delivery Questions      Flowsheet Row Most Recent Value   Delivery method UPS   Delivery address verified with patient/caregiver? Yes   Delivery address Prescription   Other address preferred N/A   Number of medications in delivery 1   Medication(s) being filled and delivered Atogepant, Topiramate (TOPAMAX), ZOLMitriptan (ZOMIG-ZMT)   Doses left of specialty medications 7   Copay verified? Yes   Copay amount $15.63   Copay form of payment No copayment ($0)   Delivery Date Selection 04/01/25   Signature Required No   Do you consent to receive electronic handouts?  Yes                 Follow-up: 3 weeks     Rocael So RPH  3/28/2025   14:26 EDT

## 2025-04-01 ENCOUNTER — APPOINTMENT (OUTPATIENT)
Dept: WOMENS IMAGING | Facility: HOSPITAL | Age: 45
End: 2025-04-01
Payer: COMMERCIAL

## 2025-04-01 DIAGNOSIS — E03.9 HYPOTHYROIDISM, UNSPECIFIED TYPE: ICD-10-CM

## 2025-04-01 PROCEDURE — 76642 ULTRASOUND BREAST LIMITED: CPT | Performed by: RADIOLOGY

## 2025-04-01 PROCEDURE — 77066 DX MAMMO INCL CAD BI: CPT | Performed by: RADIOLOGY

## 2025-04-01 PROCEDURE — 77062 BREAST TOMOSYNTHESIS BI: CPT | Performed by: RADIOLOGY

## 2025-04-01 PROCEDURE — G0279 TOMOSYNTHESIS, MAMMO: HCPCS | Performed by: RADIOLOGY

## 2025-04-01 RX ORDER — LEVOTHYROXINE SODIUM 125 UG/1
125 TABLET ORAL
Start: 2025-04-01

## 2025-04-01 NOTE — TELEPHONE ENCOUNTER
Rx Refill Note  Requested Prescriptions     Pending Prescriptions Disp Refills    levothyroxine (SYNTHROID, LEVOTHROID) 125 MCG tablet       Sig: Take 1 tablet by mouth Every Morning.      Last office visit with prescribing clinician: 3/5/2025   Last telemedicine visit with prescribing clinician: Visit date not found   Next office visit with prescribing clinician: 8/13/2025                         Would you like a call back once the refill request has been completed: [] Yes [] No    If the office needs to give you a call back, can they leave a voicemail: [] Yes [] No    Luann Lyons MA  04/01/25, 07:53 EDT

## 2025-04-25 ENCOUNTER — SPECIALTY PHARMACY (OUTPATIENT)
Dept: NEUROLOGY | Facility: CLINIC | Age: 45
End: 2025-04-25
Payer: COMMERCIAL

## 2025-04-25 NOTE — PROGRESS NOTES
Specialty Pharmacy Patient Management Program  [facility] Refill Outreach        was contacted today regarding refills of their medication(s).    [facility] Questionnaire Responses      Flowsheet Row Questionnaire Series Submission from 4/25/2025 in Initial Department with Juan, Generic Provider   Changes to allergies? No    Changes to medications? No    New conditions or infections since last clinic visit No    Unplanned office visit, urgent care, ED, or hospital admission in the last 4 weeks  No    How does patient/caregiver feel medication is working? Excellent    Financial problems or insurance changes  No    Since the previous refill, were any specialty medication doses or scheduled injections missed or delayed?  No    Delivery address Prescription    Doses left of specialty medications not many, I am at work and the bottle is at home, I estimate 6/7    Copay verified? Yes    Delivery Date Selection 05/01/25             Refill Questions      Flowsheet Row Most Recent Value   Does this patient require a clinical escalation to a pharmacist? No            Delivery Questions      Flowsheet Row Most Recent Value   Delivery method UPS   Delivery address verified with patient/caregiver? Yes   Other address preferred N/A   Number of medications in delivery 1   Medication(s) being filled and delivered Atogepant   Copay verified? Yes   Copay form of payment No copayment ($0)   Delivery Date Selection 05/01/25   Signature Required No   Do you consent to receive electronic handouts?  Yes                   Follow-up: 25 day(s)     Yisel Ireland  4/28/2025  11:26 EDT

## 2025-05-23 DIAGNOSIS — E03.9 HYPOTHYROIDISM, UNSPECIFIED TYPE: ICD-10-CM

## 2025-05-27 ENCOUNTER — SPECIALTY PHARMACY (OUTPATIENT)
Dept: NEUROLOGY | Facility: CLINIC | Age: 45
End: 2025-05-27
Payer: COMMERCIAL

## 2025-05-27 NOTE — PROGRESS NOTES
Specialty Pharmacy Patient Management Program  Refill Outreach      was contacted today regarding refills of their medication(s).    Refill Questions      Flowsheet Row Most Recent Value   Changes to allergies? No   Changes to medications? No   New conditions or infections since last clinic visit No   Unplanned office visit, urgent care, ED, or hospital admission in the last 4 weeks  No   How does patient/caregiver feel medication is working? Very good   Financial problems or insurance changes  No   Since the previous refill, were any specialty medication doses or scheduled injections missed or delayed?  No   Does this patient require a clinical escalation to a pharmacist? No            Delivery Questions      Flowsheet Row Most Recent Value   Delivery method UPS   Delivery address verified with patient/caregiver? Yes   Delivery address Prescription   Other address preferred N/A   Number of medications in delivery 2   Medication(s) being filled and delivered Atogepant, ZOLMitriptan (ZOMIG-ZMT)   Doses left of specialty medications 4 stan tabs   Copay verified? Yes   Copay amount $0   Copay form of payment No copayment ($0)   Delivery Date Selection 05/29/25   Signature Required No   Do you consent to receive electronic handouts?  Yes                 Follow-up: 85 day(s)     Yisel Ireland  5/27/2025  13:35 EDT

## 2025-05-27 NOTE — TELEPHONE ENCOUNTER
Pt would like this script to go to [facility] on [address] and states she has been waiting since April for this request. Please contact pt once the medication as been sent in  
Rx Refill Note  Requested Prescriptions     Pending Prescriptions Disp Refills    levothyroxine (SYNTHROID, LEVOTHROID) 125 MCG tablet       Sig: Take 1 tablet by mouth Every Morning.      Last office visit with prescribing clinician: 3/5/2025   Last telemedicine visit with prescribing clinician: Visit date not found   Next office visit with prescribing clinician: 8/13/2025                         Would you like a call back once the refill request has been completed: [] Yes [] No    If the office needs to give you a call back, can they leave a voicemail: [] Yes [] No    Bijal Still MA  05/23/25, 16:57 EDT  
yes

## 2025-05-28 DIAGNOSIS — E03.9 HYPOTHYROIDISM, UNSPECIFIED TYPE: ICD-10-CM

## 2025-05-28 RX ORDER — LEVOTHYROXINE SODIUM 125 UG/1
125 TABLET ORAL
Start: 2025-05-28 | End: 2025-05-28 | Stop reason: SDUPTHER

## 2025-05-28 NOTE — TELEPHONE ENCOUNTER
Caller: Laura Cuenca    Relationship: Self    Best call back number:[phone]     Requested Prescriptions:   Requested Prescriptions     Pending Prescriptions Disp Refills    levothyroxine (SYNTHROID, LEVOTHROID) 125 MCG tablet       Sig: Take 1 tablet by mouth Every Morning.        Pharmacy where request should be sent: [facility]     Last office visit with prescribing clinician: 3/5/2025   Last telemedicine visit with prescribing clinician: Visit date not found   Next office visit with prescribing clinician: 8/13/2025     Additional details provided by patient: THE PREVIOUS PRESCRIPTION DID NOT GO THRU AND PLEASE RESEND.  ALSO SHOWING NO REFILLS OR QUANTITY LISTED.  PATIENT IS COMPLETELY OUT.      Does the patient have less than a 3 day supply:  [x] Yes  [] No    Would you like a call back once the refill request has been completed: [] Yes [] No    If the office needs to give you a call back, can they leave a voicemail: [] Yes [] No    Dereck Sanon Rep   05/28/25 12:07 EDT

## 2025-05-29 RX ORDER — LEVOTHYROXINE SODIUM 125 UG/1
125 TABLET ORAL
Start: 2025-05-29 | End: 2025-05-30 | Stop reason: SDUPTHER

## 2025-05-29 NOTE — TELEPHONE ENCOUNTER
It looks like patients levothyroxine was approved, but pharmacy did not receive. Patient is calling with status. May clinical review and possibly resend medication please?

## 2025-05-30 DIAGNOSIS — E03.9 HYPOTHYROIDISM, UNSPECIFIED TYPE: ICD-10-CM

## 2025-05-30 RX ORDER — LEVOTHYROXINE SODIUM 125 UG/1
125 TABLET ORAL
Start: 2025-05-30 | End: 2025-05-30 | Stop reason: SDUPTHER

## 2025-05-30 RX ORDER — LEVOTHYROXINE SODIUM 125 UG/1
125 TABLET ORAL
Qty: 90 TABLET | Refills: 1 | Status: SHIPPED | OUTPATIENT
Start: 2025-05-30

## 2025-05-30 NOTE — TELEPHONE ENCOUNTER
Rx Refill Note  Requested Prescriptions     Pending Prescriptions Disp Refills    levothyroxine (SYNTHROID, LEVOTHROID) 125 MCG tablet       Sig: Take 1 tablet by mouth Every Morning.      Last office visit with prescribing clinician: 3/5/2025   Last telemedicine visit with prescribing clinician: Visit date not found   Next office visit with prescribing clinician: 8/13/2025                         Would you like a call back once the refill request has been completed: [] Yes [] No    If the office needs to give you a call back, can they leave a voicemail: [] Yes [] No    Luann Lyons MA  05/30/25, 10:42 EDT

## 2025-07-10 ENCOUNTER — HOSPITAL ENCOUNTER (OUTPATIENT)
Dept: MRI IMAGING | Facility: HOSPITAL | Age: 45
Discharge: HOME | End: 2025-07-10
Admitting: NEUROLOGICAL SURGERY
Payer: COMMERCIAL

## 2025-07-10 DIAGNOSIS — D33.2 DERMOID CYST OF BRAIN: ICD-10-CM

## 2025-07-10 PROCEDURE — A9577 INJ MULTIHANCE: HCPCS | Performed by: NEUROLOGICAL SURGERY

## 2025-07-10 PROCEDURE — 25510000002 GADOBENATE DIMEGLUMINE 529 MG/ML SOLUTION: Performed by: NEUROLOGICAL SURGERY

## 2025-07-10 PROCEDURE — 70553 MRI BRAIN STEM W/O & W/DYE: CPT

## 2025-07-10 RX ADMIN — GADOBENATE DIMEGLUMINE 15 ML: 529 INJECTION, SOLUTION INTRAVENOUS at 06:52

## 2025-07-17 ENCOUNTER — OFFICE VISIT (OUTPATIENT)
Dept: NEUROSURGERY | Facility: CLINIC | Age: 45
End: 2025-07-17
Payer: COMMERCIAL

## 2025-07-17 VITALS
BODY MASS INDEX: 24.9 KG/M2 | OXYGEN SATURATION: 98 % | DIASTOLIC BLOOD PRESSURE: 72 MMHG | WEIGHT: 126.8 LBS | HEIGHT: 60 IN | HEART RATE: 102 BPM | SYSTOLIC BLOOD PRESSURE: 120 MMHG

## 2025-07-17 DIAGNOSIS — D33.2 DERMOID CYST OF BRAIN: Primary | ICD-10-CM

## 2025-07-17 PROCEDURE — 99213 OFFICE O/P EST LOW 20 MIN: CPT | Performed by: NEUROLOGICAL SURGERY

## 2025-07-17 NOTE — PROGRESS NOTES
"Subjective   Patient ID: Laura Cuenca is a 44 y.o. female is here today for 1 year follow-up with a new MRI Brain done on 7/10/2025.    Today patient states that she continues to have blurred vision, along with HA's, and flashes of light in her eyes. Patient states that when she's really fatigue, things go dark in her vision, along with balance issues      History of Present Illness    This patient returns today.  She is doing fairly well.  She has some occasional visual issues but for the most part no neurologic symptoms at all.    The following portions of the patient's history were reviewed and updated as appropriate: allergies, current medications, past family history, past medical history, past social history, past surgical history, and problem list.      Objective     Vitals:    07/17/25 0823   BP: 120/72   Pulse: 102   SpO2: 98%   Weight: 57.5 kg (126 lb 12.8 oz)   Height: 152 cm (59.84\")     Body mass index is 24.9 kg/m².    Tobacco Use: Medium Risk (3/16/2025)    Patient History     Smoking Tobacco Use: Former     Smokeless Tobacco Use: Never     Passive Exposure: Not on file          Physical Exam    Neurological:      Mental Status: He is alert and oriented to person, place, and time.       Neurological Exam    Mental Status  Alert. Oriented to person, place, and time.            Assessment & Plan   Independent Review of Radiographic Studies:      I personally reviewed the images from the following studies.    I reviewed her MRI which was done on 10 July.  This shows the area of fatty tissue above the cribriform plate.  There are couple of other areas of small dermoid that are stable.  The one above the cribriform plate appears to enlarge by couple of millimeters.  The patient did have fat graft placed in that location however.    Medical Decision Making:      I told the patient I thought everything was fine at this point.  Will continue to follow her along.  We will scan her again in 1 " year.    Diagnoses and all orders for this visit:    1. Dermoid cyst of brain (Primary)  -     MRI Brain With & Without Contrast; Future      Return in about 1 year (around 7/17/2026).

## 2025-07-28 ENCOUNTER — SPECIALTY PHARMACY (OUTPATIENT)
Dept: NEUROLOGY | Facility: CLINIC | Age: 45
End: 2025-07-28
Payer: COMMERCIAL

## 2025-07-28 NOTE — PROGRESS NOTES
Specialty Pharmacy Patient Management Program  Neurology Reassessment     Laura Cuenca is a 44 y.o. female with chronic migraine seen by a Neurology provider and enrolled in the Neurology Patient Management program offered by [facility].  A follow-up outreach was conducted, including assessment of continued therapy appropriateness, medication adherence, and side effect incidence and management for atogepant (Qulitpa).     Changes to Insurance Coverage or Financial Support  No changes.       Relevant Past Medical History and Comorbidities  Relevant medical history and concomitant health conditions were discussed with the patient. The patient's chart has been reviewed for relevant past medical history and comorbid health conditions and updated as necessary.   Past Medical History:   Diagnosis Date    Allergic     egg whites, oak trees, wheat    Cholelithiasis 26    gallbladder removed 26    Congenital deformity     TOES    Environmental allergies     Frontal mass of brain     Heartburn     ON OCC    High blood pressure     HPV (human papilloma virus) infection     Hypothyroidism ?    Insomnia     Lumbar disc disorder     STATES HAS THREE DISC IN BACK THAT HAVE BEEN FUSED SINCE BIRTH    Meningioma 2021    Migraine     PT STATED SHE HAS HAD MIGRAINES SINCE A CHILD. HAD AN MRI 2021--FRONTAL MASS.     Thyroid disease     HYPO    Vision loss      Social History     Socioeconomic History    Marital status: Single   Tobacco Use    Smoking status: Former     Current packs/day: 0.00     Average packs/day: 0.5 packs/day for 20.0 years (10.0 ttl pk-yrs)     Types: Cigarettes     Start date: 2016     Quit date: 2021     Years since quittin.4    Smokeless tobacco: Never   Vaping Use    Vaping status: Some Days    Substances: THC    Devices: Disposable   Substance and Sexual Activity    Alcohol use: Yes     Comment: Maybe one or two drinks every 6 weeks.    Drug use:  Yes     Types: Marijuana     Comment: WILL STOP TILL SURGERY COMPLETED. VAPES    Sexual activity: Yes     Partners: Male     Birth control/protection: None     Problem list reviewed by Rocael So RPH on 7/28/2025 at  8:24 AM      Hospitalizations and Urgent Care Since Last Assessment  ED Visits, Admissions, or Hospitalizations: none.   Urgent Office Visits: none.       Allergies  Known allergies and reactions were discussed with the patient. The patient's chart has been reviewed for allergy information and updated as necessary.   Allergies   Allergen Reactions    Egg White [Egg White (Egg Protein)] Other (See Comments)     GIVES HER GAS    Pollen Extract Unknown - Low Severity    Wheat Unknown - Low Severity    Percocet [Oxycodone-Acetaminophen] Rash     Allergies reviewed by Rocael So RPH on 7/28/2025 at  8:24 AM      Relevant Laboratory Values  Common labs          2/26/2025    03:50 3/5/2025    13:21 3/19/2025    08:27   Common Labs   Glucose 146  92  94    BUN 11  11  8    Creatinine 0.56  0.70  0.71    Sodium 139  138  145    Potassium 4.0  4.9  4.6    Chloride 108  103  110    Calcium 8.8  9.8  10.0    Albumin 2.8  4.0  3.8    Total Bilirubin <0.2  0.2  0.2    Alkaline Phosphatase 160  185  161    AST (SGOT) 278  31  27    ALT (SGPT) 248  61  33    WBC 9.77  8.08     Hemoglobin 13.1  15.4     Hematocrit 40.7  46.7     Platelets 339  481         Lab Assessment  The above labs have been reviewed. No dose adjustments are needed for the specialty medication(s) based on the labs.       Current Medication List  This medication list has been reviewed with the patient and evaluated for any interactions or necessary modifications/recommendations, and updated to include all prescription medications, OTC medications, and supplements the patient is currently taking.  This list reflects what is contained in the patient's profile, which has also been marked as reviewed to communicate to other providers it is  the most up to date version of the patient's current medication therapy.     Current Outpatient Medications:     amitriptyline (ELAVIL) 25 MG tablet, Take 2 tablets by mouth Every Night., Disp: 180 tablet, Rfl: 3    aspirin 81 MG EC tablet, Take 1 tablet by mouth Daily., Disp: , Rfl:     Atogepant 60 MG tablet, Take 1 tablet by mouth Daily., Disp: 30 tablet, Rfl: 11    atorvastatin (LIPITOR) 10 MG tablet, Take 1 tablet by mouth Every Night., Disp: 90 tablet, Rfl: 3    fexofenadine (ALLEGRA) 180 MG tablet, Take 1 tablet by mouth Daily., Disp: , Rfl:     Ibuprofen 3 %, Gabapentin 10 %, Baclofen 2 %, lidocaine 4 %, Apply 1-2 g topically to the appropriate area as directed 3 (Three) to 4 (Four) times daily., Disp: 90 g, Rfl: 0    levothyroxine (SYNTHROID, LEVOTHROID) 125 MCG tablet, Take 1 tablet by mouth Every Morning., Disp: 90 tablet, Rfl: 1    topiramate (Topamax) 100 MG tablet, Take 1 tablet by mouth 2 (Two) Times a Day., Disp: 180 tablet, Rfl: 1    ZOLMitriptan (ZOMIG-ZMT) 5 MG disintegrating tablet, Take one tablet by mouth as needed for migraine. May repeat in 2 hours if needed. Do not exceed 2 tablets in 24 hours. (Patient taking differently: Place 1 tablet on the tongue 1 (One) Time As Needed for Migraine. Take one tablet by mouth as needed for migraine. May repeat in 2 hours if needed. Do not exceed 2 tablets in 24 hours.), Disp: 9 tablet, Rfl: 2  No current facility-administered medications for this visit.    Medicines reviewed by Rocael So RPH on 7/28/2025 at  8:24 AM    Drug Interactions  None identified.       Adverse Drug Reactions  Medication tolerability: Tolerating with no to minimal ADRs  Medication plan: Continue therapy with normal follow-up  Plan for ADR Management: N/A, no ADR's       Adherence, Self-Administration, and Current Therapy Problems  Adherence related patient's specialty therapy was discussed with the patient. The Adherence segment of this outreach has been reviewed and  updated.   Adherence Questions  Linked Medication(s) Assessed: Atogepant  On average, how many doses/injections does the patient miss per month?: 0  What are the identified reasons for non-adherence or missed doses? : no problems identified  What is the estimated medication adherence level?: %  Based on the patient/caregiver response and refill history, does this patient require an MTP to track adherence improvements?: no    Additional Barriers to Patient Self-Administration: no barriers  Methods for Supporting Patient Self-Administration: no further support needed at this time    Recently Close Medication Therapy Problems  No medication therapy recommendations to display  Open Medication Therapy Problems  No medication therapy recommendations to display     Goals of Therapy  Goals related to the patient's specialty therapy was discussed with the patient. The Patient Goals segment of this outreach has been reviewed and updated.    Goals Addressed Today        Specialty Pharmacy General Goal      7/28/25: Patient reports closer to ~2 migraine days monthly on atogepant compared to galcanezumab. Patient reports average migraine severity has decreased by about ~25-50%. No side effects or concerns.     2/5/25: Patient new start of Qulipta 60 mg daily (previously on galcanezumab but switched due to medication wearing off prior to injection date), topiramate 100 mg twice daily, and rizatriptan as needed. Baseline has 4-5 migraine days monthly. Goal with migraine regimen is to reduce frequency and severity of migraines and headaches. No side effects or concerns with current medication regimen and plan.                Quality of Life Assessment   Quality of Life related to the patient's enrollment in the patient management program and services provided was discussed with the patient. The QOL segment of this outreach has been reviewed and updated.   Quality of Life Improvement Scale: 8-Moderately  better      Reassessment Plan & Follow-Up  Medication Therapy Changes: No changes, continuing atogepant and topiramate for prevention, and zolmitriptan for acute treatment of migraines.  Related Plans, Therapy Recommendations, or Issues to Be Addressed: Nothing further to be addressed at this time.   Pharmacist to perform regular reassessments no more than (6) months from the previous assessment.  Care Coordinator to set up future refill outreaches, coordinate prescription delivery, and escalate clinical questions to pharmacist.     Attestation  Therapeutic appropriateness: Appropriate  I attest the patient was actively involved in and has agreed to the above plan of care. If the prescribed therapy is at any point deemed not appropriate based on the current or future assessments, a consultation will be initiated with the patient's specialty care provider to determine the best course of action. The revised plan of therapy will be documented along with any additional patient education provided. Discussed aforementioned material with patient by phone.    Rocael So RPH  7/28/2025  08:30 EDT

## 2025-08-11 RX ORDER — ZOLMITRIPTAN 5 MG/1
TABLET, ORALLY DISINTEGRATING ORAL
Qty: 9 TABLET | Refills: 2 | Status: SHIPPED | OUTPATIENT
Start: 2025-08-11

## 2025-08-13 ENCOUNTER — OFFICE VISIT (OUTPATIENT)
Dept: FAMILY MEDICINE CLINIC | Facility: CLINIC | Age: 45
End: 2025-08-13
Payer: COMMERCIAL

## 2025-08-13 VITALS
SYSTOLIC BLOOD PRESSURE: 122 MMHG | OXYGEN SATURATION: 99 % | DIASTOLIC BLOOD PRESSURE: 76 MMHG | TEMPERATURE: 97.8 F | HEART RATE: 90 BPM | WEIGHT: 125 LBS | HEIGHT: 60 IN | BODY MASS INDEX: 24.54 KG/M2

## 2025-08-13 DIAGNOSIS — R79.89 ELEVATED FERRITIN LEVEL: ICD-10-CM

## 2025-08-13 DIAGNOSIS — Z00.00 ENCOUNTER FOR HEALTH MAINTENANCE EXAMINATION IN ADULT: Primary | ICD-10-CM

## 2025-08-13 DIAGNOSIS — E03.9 HYPOTHYROIDISM, UNSPECIFIED TYPE: ICD-10-CM

## 2025-08-13 DIAGNOSIS — G43.711 INTRACTABLE CHRONIC MIGRAINE WITHOUT AURA AND WITH STATUS MIGRAINOSUS: ICD-10-CM

## 2025-08-13 PROCEDURE — 99396 PREV VISIT EST AGE 40-64: CPT | Performed by: STUDENT IN AN ORGANIZED HEALTH CARE EDUCATION/TRAINING PROGRAM

## 2025-08-13 RX ORDER — TOPIRAMATE 100 MG/1
100 TABLET, FILM COATED ORAL 2 TIMES DAILY
Qty: 180 TABLET | Refills: 0 | Status: SHIPPED | OUTPATIENT
Start: 2025-08-13

## 2025-08-14 LAB
ALBUMIN SERPL-MCNC: 4.3 G/DL (ref 3.9–4.9)
ALP SERPL-CCNC: 113 IU/L (ref 44–121)
ALT SERPL-CCNC: 17 IU/L (ref 0–32)
AST SERPL-CCNC: 18 IU/L (ref 0–40)
BASOPHILS # BLD AUTO: 0 X10E3/UL (ref 0–0.2)
BASOPHILS NFR BLD AUTO: 1 %
BILIRUB SERPL-MCNC: 0.2 MG/DL (ref 0–1.2)
BUN SERPL-MCNC: 10 MG/DL (ref 6–24)
BUN/CREAT SERPL: 12 (ref 9–23)
CALCIUM SERPL-MCNC: 9.7 MG/DL (ref 8.7–10.2)
CHLORIDE SERPL-SCNC: 111 MMOL/L (ref 96–106)
CO2 SERPL-SCNC: 18 MMOL/L (ref 20–29)
CREAT SERPL-MCNC: 0.83 MG/DL (ref 0.57–1)
EGFRCR SERPLBLD CKD-EPI 2021: 90 ML/MIN/1.73
EOSINOPHIL # BLD AUTO: 0.7 X10E3/UL (ref 0–0.4)
EOSINOPHIL NFR BLD AUTO: 9 %
ERYTHROCYTE [DISTWIDTH] IN BLOOD BY AUTOMATED COUNT: 12.4 % (ref 11.7–15.4)
FERRITIN SERPL-MCNC: 72 NG/ML (ref 15–150)
GLOBULIN SER CALC-MCNC: 2.5 G/DL (ref 1.5–4.5)
GLUCOSE SERPL-MCNC: 85 MG/DL (ref 70–99)
HCT VFR BLD AUTO: 49.8 % (ref 34–46.6)
HGB BLD-MCNC: 15.6 G/DL (ref 11.1–15.9)
IMM GRANULOCYTES # BLD AUTO: 0 X10E3/UL (ref 0–0.1)
IMM GRANULOCYTES NFR BLD AUTO: 0 %
IRON SATN MFR SERPL: 14 % (ref 15–55)
IRON SERPL-MCNC: 40 UG/DL (ref 27–159)
LYMPHOCYTES # BLD AUTO: 2.8 X10E3/UL (ref 0.7–3.1)
LYMPHOCYTES NFR BLD AUTO: 34 %
MCH RBC QN AUTO: 30.9 PG (ref 26.6–33)
MCHC RBC AUTO-ENTMCNC: 31.3 G/DL (ref 31.5–35.7)
MCV RBC AUTO: 99 FL (ref 79–97)
MONOCYTES # BLD AUTO: 0.9 X10E3/UL (ref 0.1–0.9)
MONOCYTES NFR BLD AUTO: 11 %
NEUTROPHILS # BLD AUTO: 3.8 X10E3/UL (ref 1.4–7)
NEUTROPHILS NFR BLD AUTO: 45 %
PLATELET # BLD AUTO: 297 X10E3/UL (ref 150–450)
POTASSIUM SERPL-SCNC: 5.2 MMOL/L (ref 3.5–5.2)
PROT SERPL-MCNC: 6.8 G/DL (ref 6–8.5)
RBC # BLD AUTO: 5.05 X10E6/UL (ref 3.77–5.28)
SODIUM SERPL-SCNC: 143 MMOL/L (ref 134–144)
T4 FREE SERPL-MCNC: 0.99 NG/DL (ref 0.82–1.77)
TIBC SERPL-MCNC: 285 UG/DL (ref 250–450)
TSH SERPL DL<=0.005 MIU/L-ACNC: 5.41 UIU/ML (ref 0.45–4.5)
UIBC SERPL-MCNC: 245 UG/DL (ref 131–425)
WBC # BLD AUTO: 8.2 X10E3/UL (ref 3.4–10.8)

## 2025-08-22 ENCOUNTER — PATIENT ROUNDING (BHMG ONLY) (OUTPATIENT)
Dept: FAMILY MEDICINE CLINIC | Facility: CLINIC | Age: 45
End: 2025-08-22
Payer: COMMERCIAL

## 2025-08-22 ENCOUNTER — SPECIALTY PHARMACY (OUTPATIENT)
Dept: NEUROLOGY | Facility: CLINIC | Age: 45
End: 2025-08-22
Payer: COMMERCIAL

## (undated) DEVICE — SUT NUROLON 4/0 TF18 CR8 I8IN C584D

## (undated) DEVICE — EXOFIN PRECISION PEN HIGH VISCOSITY TOPICAL SKIN ADHESIVE: Brand: EXOFIN PRECISION PEN, 1G

## (undated) DEVICE — GLV SURG SENSICARE W/ALOE PF LF 7.5 STRL

## (undated) DEVICE — SYR LUERLOK 20CC BX/50

## (undated) DEVICE — TISSUE RETRIEVAL SYSTEM: Brand: INZII RETRIEVAL SYSTEM

## (undated) DEVICE — SYR LL TP 10ML STRL

## (undated) DEVICE — SOL NACL 0.9PCT 1000ML

## (undated) DEVICE — STRAIGHT TIP, UNIVERSAL

## (undated) DEVICE — PENCL E/S ULTRAVAC TELESCP NOSE HOLSTR 10FT

## (undated) DEVICE — INTENDED FOR TISSUE SEPARATION, AND OTHER PROCEDURES THAT REQUIRE A SHARP SURGICAL BLADE TO PUNCTURE OR CUT.: Brand: BARD-PARKER ® STAINLESS STEEL BLADES

## (undated) DEVICE — PK CRANI 40

## (undated) DEVICE — Device

## (undated) DEVICE — LAPAROVUE VISIBILITY SYSTEM LAPAROSCOPIC SOLUTIONS: Brand: LAPAROVUE

## (undated) DEVICE — DRSNG SURESITE123 6X8IN

## (undated) DEVICE — LAPAROSCOPIC SMOKE FILTRATION SYSTEM: Brand: PALL LAPAROSHIELD® PLUS LAPAROSCOPIC SMOKE FILTRATION SYSTEM

## (undated) DEVICE — DISPOSABLE TUBING SET AND EXTENDER FILTER TUBING

## (undated) DEVICE — 1010 S-DRAPE TOWEL DRAPE 10/BX: Brand: STERI-DRAPE™

## (undated) DEVICE — CVR PROB 96IN LF STRL

## (undated) DEVICE — SOL ISO/ALC RUB 70PCT 4OZ

## (undated) DEVICE — PERFORATOR CDM WO/ DURAGUARD 14/11

## (undated) DEVICE — 3M™ STERI-STRIP™ REINFORCED ADHESIVE SKIN CLOSURES, R1547, 1/2 IN X 4 IN (12 MM X 100 MM), 6 STRIPS/ENVELOPE: Brand: 3M™ STERI-STRIP™

## (undated) DEVICE — ANTIBACTERIAL UNDYED BRAIDED (POLYGLACTIN 910), SYNTHETIC ABSORBABLE SUTURE: Brand: COATED VICRYL

## (undated) DEVICE — TOOL MR8-15MH30 MR8 15CM MATCH 3MM: Brand: MIDAS REX MR8

## (undated) DEVICE — DISPOSABLE MONOPOLAR ENDOSCOPIC CORD 10 FT. (3M): Brand: KIRWAN

## (undated) DEVICE — MAYFIELD® DISPOSABLE ADULT SKULL PIN (PLASTIC BASE): Brand: MAYFIELD®

## (undated) DEVICE — UNDRGLV SURG BIOGEL PUNCTUREINDICATION SZ7 PF STRL

## (undated) DEVICE — INSUFFLATION TUBING SET, WITH GAS FILTER: Brand: N.A.

## (undated) DEVICE — 3M™ STERI-STRIP™ ANTIMICROBIAL SKIN CLOSURES 1/2 INCH X 4 INCHES 50/CARTON 4 CARTONS/CASE A1847: Brand: 3M™ STERI-STRIP™

## (undated) DEVICE — 2, DISPOSABLE SUCTION/IRRIGATOR WITH DISPOSABLE TIP: Brand: STRYKEFLOW

## (undated) DEVICE — MARKR SKIN W/RULR AND LBL

## (undated) DEVICE — GLV SURG BIOGEL LTX PF 7

## (undated) DEVICE — SPHR MARKR STEALTH STATION

## (undated) DEVICE — Device: Brand: TISSUE RETRIEVAL SYSTEM

## (undated) DEVICE — 3M™ STERI-DRAPE™ INSTRUMENT POUCH 1018L: Brand: STERI-DRAPE™

## (undated) DEVICE — STPLR SKIN VISISTAT WD 35CT

## (undated) DEVICE — DISPOSABLE IRRIGATION BIPOLAR CORD, M1000 TYPE: Brand: KIRWAN

## (undated) DEVICE — ENDOPOUCH RETRIEVER SPECIMEN RETRIEVAL BAGS: Brand: ENDOPOUCH RETRIEVER

## (undated) DEVICE — SUT MNCRYL PLS ANTIB UD 4/0 PS2 18IN

## (undated) DEVICE — SUT VIC 0 UR6 27IN VCP603H

## (undated) DEVICE — DRP MICROSCOPE 4 BINOCULAR CV 54X150IN

## (undated) DEVICE — LAPAROSCOPIC DISSECTOR: Brand: DEROYAL

## (undated) DEVICE — TRAP FLD MINIVAC MEGADYNE 100ML

## (undated) DEVICE — PK LAP CHOLE BG

## (undated) DEVICE — ENDOPATH XCEL BLADELESS TROCARS WITH STABILITY SLEEVES: Brand: ENDOPATH XCEL

## (undated) DEVICE — STPCK 3WY D201 DISCOFIX

## (undated) DEVICE — SOL IRR PHYSIOSOL BTL 1000ML

## (undated) DEVICE — THE DEVICE IS A DISPOSABLE, LIGATURE PASSING, SUTURING APPARATUS AND NEEDLE GUIDE FOR THE ABDOMINAL WALL WHICH IS NON-POWERED, HAND-HELD, AND HAND-MANIPULATED,INTENDED TO BE USED IN VARIOUS GENERAL SURGICAL PROCEDURES. THE DEVICE INCLUDES A LIGATURE CARRIER PATHWAY, NEEDLE GUIDE, TWO NEEDLES, REFERENCE PLANE T-BAR, AND A GUIDEWIRE. THE HANDLE OF THE DEVICE PROVIDES TWO DIAMETRICALLY OPPOSED ENCLOSED GUIDEWAYS FOR THE ADVANCEMENT AND RETRACTION OF THE NEEDLES UNDER MANUAL CONTROL OF A PLUNGER LOCATED AT THE PROXIMAL END OF THE DEVICE.AS PART OF THE M-CLOSE CONVENIENCE KIT, A NERVE BLOCK NEEDLE IS INCLUDED FOR THE ADMINISTRATION OF LOCAL ANESTHETIC AGENTS TO PROVIDE REGIONAL AND LOCAL ANESTHESIA.  A TELFA ANTIMICROBIAL, NON-ADHERENT PAD IS ALSO PROVIDED IN THE KIT FOR USE AS A PRIMARY DRESSING FOR THE SURGICAL INCISION.: Brand: M-CLOSE KIT

## (undated) DEVICE — TOOL MR8-F2/7TA23 MR8 F2/7CM TAPER 2.3MM: Brand: MIDAS REX MR8

## (undated) DEVICE — GLV SURG BIOGEL M LTX PF 6 1/2

## (undated) DEVICE — ADHS LIQ MASTISOL 2/3ML

## (undated) DEVICE — SMOKE EVACUATION TUBING WITH 7/8 IN TO 1/4 IN REDUCER: Brand: BUFFALO FILTER

## (undated) DEVICE — DURAHOOK 1/4HOOK PK/6

## (undated) DEVICE — CONN TBG Y 5 IN 1 LF STRL

## (undated) DEVICE — DRSNG TELFA PAD NONADH STR 1S 3X8IN